# Patient Record
Sex: FEMALE | Race: OTHER | HISPANIC OR LATINO | Employment: FULL TIME | URBAN - METROPOLITAN AREA
[De-identification: names, ages, dates, MRNs, and addresses within clinical notes are randomized per-mention and may not be internally consistent; named-entity substitution may affect disease eponyms.]

---

## 2017-08-26 ENCOUNTER — NON-PROVIDER VISIT (OUTPATIENT)
Dept: URGENT CARE | Facility: CLINIC | Age: 52
End: 2017-08-26

## 2017-08-26 DIAGNOSIS — Z02.1 PRE-EMPLOYMENT DRUG SCREENING: ICD-10-CM

## 2017-08-26 LAB
AMP AMPHETAMINE: NORMAL
COC COCAINE: NORMAL
INT CON NEG: NORMAL
INT CON POS: NORMAL
MET METHAMPHETAMINES: NORMAL
OPI OPIATES: NORMAL
PCP PHENCYCLIDINE: NORMAL
POC DRUG COMMENT 753798-OCCUPATIONAL HEALTH: NORMAL
THC: NORMAL

## 2017-08-26 PROCEDURE — 80305 DRUG TEST PRSMV DIR OPT OBS: CPT | Performed by: PHYSICIAN ASSISTANT

## 2017-11-03 ENCOUNTER — TELEPHONE (OUTPATIENT)
Dept: MEDICAL GROUP | Facility: LAB | Age: 52
End: 2017-11-03

## 2017-11-03 DIAGNOSIS — Z12.39 BREAST CANCER SCREENING, HIGH RISK PATIENT: ICD-10-CM

## 2017-11-03 NOTE — TELEPHONE ENCOUNTER
Patient is coming in to be a new patient with you and she would like to do a mammogram before coming in on 12/8/17

## 2017-12-01 ENCOUNTER — TELEPHONE (OUTPATIENT)
Dept: MEDICAL GROUP | Facility: LAB | Age: 52
End: 2017-12-01

## 2017-12-01 NOTE — TELEPHONE ENCOUNTER
Called Cecilia Taveras and left a message to return my call regarding his/her upcoming NP appointment with Enedina Gipson P.A.-C..   If patient returns the call please transfer them to extension: [phone]

## 2017-12-08 ENCOUNTER — HOSPITAL ENCOUNTER (OUTPATIENT)
Dept: RADIOLOGY | Facility: MEDICAL CENTER | Age: 52
End: 2017-12-08

## 2017-12-08 ENCOUNTER — OFFICE VISIT (OUTPATIENT)
Dept: MEDICAL GROUP | Facility: LAB | Age: 52
End: 2017-12-08
Payer: COMMERCIAL

## 2017-12-08 VITALS
RESPIRATION RATE: 12 BRPM | BODY MASS INDEX: 20.61 KG/M2 | WEIGHT: 112 LBS | OXYGEN SATURATION: 98 % | TEMPERATURE: 98.9 F | DIASTOLIC BLOOD PRESSURE: 78 MMHG | HEIGHT: 62 IN | SYSTOLIC BLOOD PRESSURE: 100 MMHG | HEART RATE: 74 BPM

## 2017-12-08 DIAGNOSIS — Z30.09 FAMILY PLANNING: ICD-10-CM

## 2017-12-08 DIAGNOSIS — E78.5 HYPERLIPIDEMIA, UNSPECIFIED HYPERLIPIDEMIA TYPE: ICD-10-CM

## 2017-12-08 DIAGNOSIS — R73.09 ELEVATED HEMOGLOBIN A1C: ICD-10-CM

## 2017-12-08 DIAGNOSIS — E03.9 HYPOTHYROIDISM, UNSPECIFIED TYPE: ICD-10-CM

## 2017-12-08 PROBLEM — Z00.00 PREVENTATIVE HEALTH CARE: Status: ACTIVE | Noted: 2017-12-08

## 2017-12-08 PROCEDURE — 99204 OFFICE O/P NEW MOD 45 MIN: CPT | Performed by: PHYSICIAN ASSISTANT

## 2017-12-08 RX ORDER — LEVOTHYROXINE SODIUM 0.07 MG/1
75 TABLET ORAL
COMMUNITY
End: 2018-02-08 | Stop reason: SDUPTHER

## 2017-12-08 RX ORDER — LEVONORGESTREL AND ETHINYL ESTRADIOL 0.1-0.02MG
1 KIT ORAL DAILY
COMMUNITY
End: 2018-03-07 | Stop reason: SDUPTHER

## 2017-12-08 ASSESSMENT — PAIN SCALES - GENERAL: PAINLEVEL: NO PAIN

## 2017-12-08 ASSESSMENT — PATIENT HEALTH QUESTIONNAIRE - PHQ9: CLINICAL INTERPRETATION OF PHQ2 SCORE: 0

## 2017-12-08 NOTE — ASSESSMENT & PLAN NOTE
Dx around 2014  Treating with levothyroxine 75mcg qd as directed.   This dose was increased then retested and still subclinically hypothyroidism therefore told to stay and retest in 6 mo.   Last labs 5/22/17 TSH 5.070, free T4 1.23  Had palpitations once when cat passed suddenly.   Diarrhea intermittently but not all the time.   Denies constipation, intolerance to hot/cold, rash

## 2017-12-08 NOTE — PROGRESS NOTES
Chief Complaint   Patient presents with   • Establish Care     HPI:   Cecilia Taveras is a 52 y.o. female here to establish care and to discuss elevated sugars, thyroid and birth control     Family planning  Has been taking birth control for several years.   Currently taking levonorgestrel-ethinyl estradiol 0.1-20mg-mcg qd.   LMP: 11/14/17  Monthly, regular.   Little cramping.   Maybe had one hot flash.   Denies night sweats, vaginal dryness.   Denies HA, CP, abdominal pain, N/V, dysuria, freq, urgency, hematuria, change in vaginal discharge, lesions or sores      Hypothyroidism  Dx around 2014  Treating with levothyroxine 75mcg qd as directed.   This dose was increased then retested and still subclinically hypothyroidism therefore told to stay and retest in 6 mo.   Last labs 5/22/17 TSH 5.070, free T4 1.23  Had palpitations once when cat passed suddenly.   Diarrhea intermittently but not all the time.   Denies constipation, intolerance to hot/cold, rash    Elevated hemoglobin A1c  5/22/17 A1c 5.7  Diet: tried to eat well, healthy smoothie, healthy salad. Sometimes doesn't eat the healthy stuff.   Exercise: aerobics and little weight lifting.   Denies polyuria, polydipsia, polyphagia      Current medicines (including changes today)  Current Outpatient Prescriptions   Medication Sig Dispense Refill   • levothyroxine (SYNTHROID) 75 MCG Tab Take 75 mcg by mouth Every morning on an empty stomach.     • levonorgestrel-ethinyl estradiol (AVIANE, ALESSE, LESSINA) 0.1-20 MG-MCG per tablet Take 1 Tab by mouth every day.       No current facility-administered medications for this visit.      She  has no past medical history on file.  She  has no past surgical history on file.  Social History   Substance Use Topics   • Smoking status: Never Smoker   • Smokeless tobacco: Never Used   • Alcohol use No     Social History     Social History Narrative   • No narrative on file     Family History   Problem Relation Age of Onset   •  "Diabetes Mother    • Cancer Neg Hx    • Heart Disease Neg Hx    • Hypertension Neg Hx    • Stroke Neg Hx      Family Status   Relation Status   • Mother Alive   • Father    • Sister Alive   • Sister Alive   • Neg Hx        ROS  Constitutional: Negative for fever, chills, weight loss  HENT: Negative for ear pain, nosebleeds, congestion, sore throat and neck pain.   Eyes: Negative for blurred vision.   Respiratory: Negative for cough, sputum production, shortness of breath and wheezing.   Cardiovascular: Negative for chest pain, palpitations, orthopnea and leg swelling.   Gastrointestinal: Negative for heartburn, nausea, vomiting and abdominal pain.   Genitourinary: Negative for dysuria, urgency and frequency.   Musculoskeletal: Negative for myalgias, back pain and joint pain.   Skin: Negative for rash and itching.   Neurological: Negative for dizziness, tingling, tremors, sensory change, focal weakness and headaches.   Endo/Heme/Allergies: Does not bruise/bleed easily.   Psychiatric/Behavioral: Negative for depression, + intermittent anxiety, or memory loss.   All other systems reviewed and are negative except as in HPI.     Objective:     Blood pressure 100/78, pulse 74, temperature 37.2 °C (98.9 °F), resp. rate 12, height 1.575 m (5' 2\"), weight 50.8 kg (112 lb), last menstrual period 2017, SpO2 98 %, not currently breastfeeding. Body mass index is 20.49 kg/m².  Physical Exam:    Constitutional: Alert, no distress.  Skin: Warm, dry, good turgor, no rashes in visible areas.  Eye: PERRLA, conjunctiva clear, lids normal.  ENMT: Lips without lesions, good dentition, oropharynx clear.  Neck: Trachea midline, no masses, no thyromegaly.  Respiratory: Unlabored respiratory effort, lungs clear to auscultation, no wheezes, no ronchi.  Cardiovascular: Normal S1, S2, no murmur, no edema.  Abdomen: Soft, non-tender, no masses, no hepatosplenomegaly.  Psych: Alert and oriented x3, normal affect and " mood.    Assessment and Plan:   The following treatment plan was discussed     1. Family planning  Stable, continue current treatment    3. Hypothyroidism, unspecified type  Stable, subclinically hypothyroidism, continue current treatment, labs ordered  - TSH; Future  - FREE THYROXINE; Future    4. Elevated hemoglobin A1c  Stable, continue working on diet and exercise  - COMP METABOLIC PANEL; Future  - HEMOGLOBIN A1C; Future    5. Hyperlipidemia, unspecified hyperlipidemia type  Uncontrolled, continue working on diet and exercise- discussed possible changes that can be made to benefit lifestyle  - LIPID PROFILE; Future      Records requested.  Followup: Return in about 6 months (around 6/8/2018) for annual.           Please note that this dictation was created using voice recognition software. I have made every reasonable attempt to correct obvious errors, but I expect that there are errors of grammar and possibly content that I did not discover before finalizing the note.

## 2017-12-08 NOTE — ASSESSMENT & PLAN NOTE
5/22/17 A1c 5.7  Diet: tried to eat well, healthy smoothie, healthy salad. Sometimes doesn't eat the healthy stuff.   Exercise: aerobics and little weight lifting.   Denies polyuria, polydipsia, polyphagia

## 2017-12-08 NOTE — ASSESSMENT & PLAN NOTE
Has been taking birth control for several years.   Currently taking levonorgestrel-ethinyl estradiol 0.1-20mg-mcg qd.   LMP: 11/14/17  Monthly, regular.   Little cramping.   Maybe had one hot flash.   Denies night sweats, vaginal dryness.   Denies HA, CP, abdominal pain, N/V, dysuria, freq, urgency, hematuria, change in vaginal discharge, lesions or sores

## 2017-12-15 ENCOUNTER — HOSPITAL ENCOUNTER (OUTPATIENT)
Dept: RADIOLOGY | Facility: MEDICAL CENTER | Age: 52
End: 2017-12-15
Attending: PHYSICIAN ASSISTANT
Payer: COMMERCIAL

## 2017-12-15 DIAGNOSIS — Z12.31 SCREENING MAMMOGRAM, ENCOUNTER FOR: ICD-10-CM

## 2017-12-15 PROCEDURE — G0202 SCR MAMMO BI INCL CAD: HCPCS

## 2017-12-19 ENCOUNTER — TELEPHONE (OUTPATIENT)
Dept: MEDICAL GROUP | Facility: LAB | Age: 52
End: 2017-12-19

## 2017-12-19 NOTE — TELEPHONE ENCOUNTER
----- Message from Enedina Gipson P.A.-C. sent at 12/18/2017  5:35 PM PST -----  Please inform pt her mammogram was normal, she does have dense breast which may obscure some small findings, recommend mammogram in 1 year  Thank you  Enedina

## 2017-12-29 ENCOUNTER — HOSPITAL ENCOUNTER (OUTPATIENT)
Dept: LAB | Facility: MEDICAL CENTER | Age: 52
End: 2017-12-29
Attending: PHYSICIAN ASSISTANT
Payer: COMMERCIAL

## 2017-12-29 DIAGNOSIS — R73.09 ELEVATED HEMOGLOBIN A1C: ICD-10-CM

## 2017-12-29 DIAGNOSIS — E03.9 HYPOTHYROIDISM, UNSPECIFIED TYPE: ICD-10-CM

## 2017-12-29 DIAGNOSIS — E78.5 HYPERLIPIDEMIA, UNSPECIFIED HYPERLIPIDEMIA TYPE: ICD-10-CM

## 2017-12-29 LAB
ALBUMIN SERPL BCP-MCNC: 3.8 G/DL (ref 3.2–4.9)
ALBUMIN/GLOB SERPL: 1.3 G/DL
ALP SERPL-CCNC: 39 U/L (ref 30–99)
ALT SERPL-CCNC: 20 U/L (ref 2–50)
ANION GAP SERPL CALC-SCNC: 5 MMOL/L (ref 0–11.9)
AST SERPL-CCNC: 18 U/L (ref 12–45)
BILIRUB SERPL-MCNC: 0.5 MG/DL (ref 0.1–1.5)
BUN SERPL-MCNC: 16 MG/DL (ref 8–22)
CALCIUM SERPL-MCNC: 9.1 MG/DL (ref 8.5–10.5)
CHLORIDE SERPL-SCNC: 105 MMOL/L (ref 96–112)
CHOLEST SERPL-MCNC: 192 MG/DL (ref 100–199)
CO2 SERPL-SCNC: 25 MMOL/L (ref 20–33)
CREAT SERPL-MCNC: 0.71 MG/DL (ref 0.5–1.4)
EST. AVERAGE GLUCOSE BLD GHB EST-MCNC: 120 MG/DL
GFR SERPL CREATININE-BSD FRML MDRD: >60 ML/MIN/1.73 M 2
GLOBULIN SER CALC-MCNC: 2.9 G/DL (ref 1.9–3.5)
GLUCOSE SERPL-MCNC: 92 MG/DL (ref 65–99)
HBA1C MFR BLD: 5.8 % (ref 0–5.6)
HDLC SERPL-MCNC: 63 MG/DL
LDLC SERPL CALC-MCNC: 108 MG/DL
POTASSIUM SERPL-SCNC: 3.8 MMOL/L (ref 3.6–5.5)
PROT SERPL-MCNC: 6.7 G/DL (ref 6–8.2)
SODIUM SERPL-SCNC: 135 MMOL/L (ref 135–145)
T4 FREE SERPL-MCNC: 0.95 NG/DL (ref 0.53–1.43)
TRIGL SERPL-MCNC: 104 MG/DL (ref 0–149)
TSH SERPL DL<=0.005 MIU/L-ACNC: 2.58 UIU/ML (ref 0.38–5.33)

## 2017-12-29 PROCEDURE — 80053 COMPREHEN METABOLIC PANEL: CPT

## 2017-12-29 PROCEDURE — 36415 COLL VENOUS BLD VENIPUNCTURE: CPT

## 2017-12-29 PROCEDURE — 83036 HEMOGLOBIN GLYCOSYLATED A1C: CPT

## 2017-12-29 PROCEDURE — 84443 ASSAY THYROID STIM HORMONE: CPT

## 2017-12-29 PROCEDURE — 84439 ASSAY OF FREE THYROXINE: CPT

## 2017-12-29 PROCEDURE — 80061 LIPID PANEL: CPT

## 2018-01-26 ENCOUNTER — TELEPHONE (OUTPATIENT)
Dept: MEDICAL GROUP | Facility: LAB | Age: 53
End: 2018-01-26

## 2018-01-26 NOTE — TELEPHONE ENCOUNTER
1. Caller Name: Cecilia                                        Call Back Number:       Patient approves a detailed voicemail message: N\A  My chart      Pt called for referral  to derm. Has a mole on chin wants removed.  Please sign order

## 2018-02-08 DIAGNOSIS — E03.9 HYPOTHYROIDISM, UNSPECIFIED TYPE: ICD-10-CM

## 2018-02-08 RX ORDER — LEVOTHYROXINE SODIUM 0.07 MG/1
75 TABLET ORAL
Qty: 90 TAB | Refills: 2 | Status: SHIPPED | OUTPATIENT
Start: 2018-02-08 | End: 2018-06-19 | Stop reason: SDUPTHER

## 2018-02-08 NOTE — TELEPHONE ENCOUNTER
Was the patient seen in the last year in this department? Yes     Does patient have an active prescription for medications requested? No     Received Request Via: Patient     Last visit:12/8/17

## 2018-03-07 DIAGNOSIS — Z30.09 FAMILY PLANNING: ICD-10-CM

## 2018-03-09 RX ORDER — LEVONORGESTREL AND ETHINYL ESTRADIOL 0.1-0.02MG
1 KIT ORAL DAILY
Qty: 84 TAB | Refills: 3 | Status: SHIPPED | OUTPATIENT
Start: 2018-03-09 | End: 2018-11-28 | Stop reason: SDUPTHER

## 2018-06-19 ENCOUNTER — TELEPHONE (OUTPATIENT)
Dept: MEDICAL GROUP | Facility: LAB | Age: 53
End: 2018-06-19

## 2018-06-19 DIAGNOSIS — E03.9 HYPOTHYROIDISM, UNSPECIFIED TYPE: ICD-10-CM

## 2018-06-19 DIAGNOSIS — R73.09 ELEVATED HEMOGLOBIN A1C: ICD-10-CM

## 2018-06-19 DIAGNOSIS — E78.00 PURE HYPERCHOLESTEROLEMIA: ICD-10-CM

## 2018-06-20 RX ORDER — LEVOTHYROXINE SODIUM 0.07 MG/1
75 TABLET ORAL
Qty: 90 TAB | Refills: 2 | Status: SHIPPED | OUTPATIENT
Start: 2018-06-20 | End: 2019-03-23 | Stop reason: SDUPTHER

## 2018-06-21 ENCOUNTER — HOSPITAL ENCOUNTER (OUTPATIENT)
Dept: LAB | Facility: MEDICAL CENTER | Age: 53
End: 2018-06-21
Attending: PHYSICIAN ASSISTANT
Payer: COMMERCIAL

## 2018-06-21 DIAGNOSIS — E03.9 HYPOTHYROIDISM, UNSPECIFIED TYPE: ICD-10-CM

## 2018-06-21 DIAGNOSIS — E78.00 PURE HYPERCHOLESTEROLEMIA: ICD-10-CM

## 2018-06-21 DIAGNOSIS — R73.09 ELEVATED HEMOGLOBIN A1C: ICD-10-CM

## 2018-06-21 LAB
ALBUMIN SERPL BCP-MCNC: 3.9 G/DL (ref 3.2–4.9)
ALBUMIN/GLOB SERPL: 1.3 G/DL
ALP SERPL-CCNC: 48 U/L (ref 30–99)
ALT SERPL-CCNC: 24 U/L (ref 2–50)
ANION GAP SERPL CALC-SCNC: 7 MMOL/L (ref 0–11.9)
AST SERPL-CCNC: 22 U/L (ref 12–45)
BILIRUB SERPL-MCNC: 0.6 MG/DL (ref 0.1–1.5)
BUN SERPL-MCNC: 21 MG/DL (ref 8–22)
CALCIUM SERPL-MCNC: 9 MG/DL (ref 8.5–10.5)
CHLORIDE SERPL-SCNC: 106 MMOL/L (ref 96–112)
CHOLEST SERPL-MCNC: 175 MG/DL (ref 100–199)
CO2 SERPL-SCNC: 24 MMOL/L (ref 20–33)
CREAT SERPL-MCNC: 0.73 MG/DL (ref 0.5–1.4)
EST. AVERAGE GLUCOSE BLD GHB EST-MCNC: 123 MG/DL
GLOBULIN SER CALC-MCNC: 3 G/DL (ref 1.9–3.5)
GLUCOSE SERPL-MCNC: 87 MG/DL (ref 65–99)
HBA1C MFR BLD: 5.9 % (ref 0–5.6)
HDLC SERPL-MCNC: 61 MG/DL
LDLC SERPL CALC-MCNC: 99 MG/DL
POTASSIUM SERPL-SCNC: 4.2 MMOL/L (ref 3.6–5.5)
PROT SERPL-MCNC: 6.9 G/DL (ref 6–8.2)
SODIUM SERPL-SCNC: 137 MMOL/L (ref 135–145)
T4 FREE SERPL-MCNC: 0.94 NG/DL (ref 0.53–1.43)
TRIGL SERPL-MCNC: 73 MG/DL (ref 0–149)
TSH SERPL DL<=0.005 MIU/L-ACNC: 4.59 UIU/ML (ref 0.38–5.33)

## 2018-06-21 PROCEDURE — 80053 COMPREHEN METABOLIC PANEL: CPT

## 2018-06-21 PROCEDURE — 80061 LIPID PANEL: CPT

## 2018-06-21 PROCEDURE — 83036 HEMOGLOBIN GLYCOSYLATED A1C: CPT

## 2018-06-21 PROCEDURE — 36415 COLL VENOUS BLD VENIPUNCTURE: CPT

## 2018-06-21 PROCEDURE — 84439 ASSAY OF FREE THYROXINE: CPT

## 2018-06-21 PROCEDURE — 84443 ASSAY THYROID STIM HORMONE: CPT

## 2018-06-28 ENCOUNTER — OFFICE VISIT (OUTPATIENT)
Dept: MEDICAL GROUP | Facility: LAB | Age: 53
End: 2018-06-28
Payer: COMMERCIAL

## 2018-06-28 VITALS
HEIGHT: 62 IN | HEART RATE: 67 BPM | TEMPERATURE: 99.7 F | BODY MASS INDEX: 19.69 KG/M2 | RESPIRATION RATE: 12 BRPM | WEIGHT: 107 LBS | OXYGEN SATURATION: 97 % | DIASTOLIC BLOOD PRESSURE: 60 MMHG | SYSTOLIC BLOOD PRESSURE: 110 MMHG

## 2018-06-28 DIAGNOSIS — R73.09 ELEVATED HEMOGLOBIN A1C: ICD-10-CM

## 2018-06-28 DIAGNOSIS — D22.9 NEVUS: ICD-10-CM

## 2018-06-28 DIAGNOSIS — E03.9 HYPOTHYROIDISM, UNSPECIFIED TYPE: ICD-10-CM

## 2018-06-28 PROCEDURE — 99214 OFFICE O/P EST MOD 30 MIN: CPT | Performed by: PHYSICIAN ASSISTANT

## 2018-06-28 NOTE — ASSESSMENT & PLAN NOTE
A1c increased slightly to 5.9.  5/22/17 A1c 5.7  Diet: tried to eat well, healthy smoothie, healthy salad. Sometimes doesn't eat the healthy stuff but that's rare per pt. She did get on a muffin kick and then was told it had a lot of sugar, recently stopped.   Exercise: aerobics and little weight lifting. Tried to not do much cardio since she does not want to lose anymore weight.   Has been 107# for last 4-5 months per pt. Denies unwanted weight loss but stated if she started exercising this would go done.   Denies polyuria, polydipsia, polyphagia

## 2018-06-28 NOTE — ASSESSMENT & PLAN NOTE
This is chronic. Pt treating with levothyroxine 75mcg qd  Taking medicine as directed on empty stomach with water and waits at last 30 minutes to consume other food or beverage.   Denies palpitations, skin changes, temperature intolerance, changes in bowel habits.  Results for GRACIE MELCHOR (MRN [id number]) as of 6/28/2018 12:57   Ref. Range 6/21/2018 07:34   TSH Latest Ref Range: 0.380 - 5.330 uIU/mL 4.590   Free T-4 Latest Ref Range: 0.53 - 1.43 ng/dL 0.94

## 2018-06-28 NOTE — PROGRESS NOTES
Subjective:     Chief Complaint   Patient presents with   • Results     Gracie Taveras is a 52 y.o. female here today for prediabetes, thyroid, mole as listed below    Elevated hemoglobin A1c  A1c increased slightly to 5.9.  5/22/17 A1c 5.7  Diet: tried to eat well, healthy smoothie, healthy salad. Sometimes doesn't eat the healthy stuff but that's rare per pt. She did get on a muffin kick and then was told it had a lot of sugar, recently stopped.   Exercise: aerobics and little weight lifting. Tried to not do much cardio since she does not want to lose anymore weight.   Has been 107# for last 4-5 months per pt. Denies unwanted weight loss but stated if she started exercising this would go done.   Denies polyuria, polydipsia, polyphagia      Hypothyroidism  This is chronic. Pt treating with levothyroxine 75mcg qd  Taking medicine as directed on empty stomach with water and waits at last 30 minutes to consume other food or beverage.   Denies palpitations, skin changes, temperature intolerance, changes in bowel habits.  Results for GRACIE TAVERAS (MRN [id number]) as of 6/28/2018 12:57   Ref. Range 6/21/2018 07:34   TSH Latest Ref Range: 0.380 - 5.330 uIU/mL 4.590   Free T-4 Latest Ref Range: 0.53 - 1.43 ng/dL 0.94         Nevus  Has mole on right chin for almost her whole life, no change.   Occasionally has a hair growing out of it.   She is finally at the point she wants this removed and would like referral to dermatologist  Denies hx skin cancer, changing of lesion, growing, changing color.      Current medicines (including changes today)  Current Outpatient Prescriptions   Medication Sig Dispense Refill   • levothyroxine (SYNTHROID) 75 MCG Tab TAKE 1 TAB BY MOUTH EVERY MORNING ON AN EMPTY STOMACH. 90 Tab 2   • levonorgestrel-ethinyl estradiol (AVIANE, ALESSE, LESSINA) 0.1-20 MG-MCG per tablet Take 1 Tab by mouth every day. 84 Tab 3     No current facility-administered medications for this visit.      She  has no past  "medical history on file.    ROS   No chest pain, no shortness of breath, no abdominal pain       Objective:     Blood pressure 110/60, pulse 67, temperature 37.6 °C (99.7 °F), resp. rate 12, height 1.575 m (5' 2.01\"), weight 48.5 kg (107 lb), SpO2 97 %. Body mass index is 19.57 kg/m².   Physical Exam:  Alert, oriented in no acute distress.  Eye contact is good, speech goal directed, affect calm  HEENT: conjunctiva non-injected, sclera non-icteric, PERRL.  Pinna normal. TM pearly gray.   Oral mucous membranes pink and moist with no lesions.  Neck No adenopathy or masses in the neck or supraclavicular regions.  Lungs: clear to auscultation bilaterally with good excursion.  CV: regular rate and rhythm.  Abdomen: soft, nontender, no HSM, No CVAT  Skin: 4mm skin colored dome shaped soft symmetric papule.   Ext: no edema, color normal, peripheral pulses 2+, temperature normal        Assessment and Plan:   The following treatment plan was discussed     1. Elevated hemoglobin A1c  chronic issue. Discussed diet more in depth. Recommended nutritionist but pt declines at this time.   - COMP METABOLIC PANEL; Future  - HEMOGLOBIN A1C; Future  - LIPID PROFILE; Future    2. Hypothyroidism, unspecified type  Stable, continue current treatment    3. Nevus  New dx, B9 looking, referral placed.   - REFERRAL TO DERMATOLOGY      Followup: Return in about 6 months (around 12/28/2018) for chronic issues.           Please note that this dictation was created using voice recognition software. I have made every reasonable attempt to correct obvious errors, but I expect that there are errors of grammar and possibly content that I did not discover before finalizing the note.  "

## 2018-06-28 NOTE — ASSESSMENT & PLAN NOTE
Has mole on right chin for almost her whole life, no change.   Occasionally has a hair growing out of it.   She is finally at the point she wants this removed and would like referral to dermatologist  Denies hx skin cancer, changing of lesion, growing, changing color.

## 2018-07-27 ENCOUNTER — APPOINTMENT (RX ONLY)
Dept: URBAN - METROPOLITAN AREA CLINIC 4 | Facility: CLINIC | Age: 53
Setting detail: DERMATOLOGY
End: 2018-07-27

## 2018-07-27 DIAGNOSIS — D22 MELANOCYTIC NEVI: ICD-10-CM

## 2018-07-27 PROBLEM — D48.5 NEOPLASM OF UNCERTAIN BEHAVIOR OF SKIN: Status: ACTIVE | Noted: 2018-07-27

## 2018-07-27 PROBLEM — E03.9 HYPOTHYROIDISM, UNSPECIFIED: Status: ACTIVE | Noted: 2018-07-27

## 2018-07-27 PROCEDURE — ? BIOPSY BY SHAVE METHOD

## 2018-07-27 PROCEDURE — 11100: CPT

## 2018-07-27 ASSESSMENT — LOCATION ZONE DERM: LOCATION ZONE: FACE

## 2018-07-27 ASSESSMENT — LOCATION DETAILED DESCRIPTION DERM: LOCATION DETAILED: RIGHT INFERIOR LATERAL BUCCAL CHEEK

## 2018-07-27 ASSESSMENT — LOCATION SIMPLE DESCRIPTION DERM: LOCATION SIMPLE: RIGHT CHEEK

## 2018-07-27 NOTE — PROCEDURE: BIOPSY BY SHAVE METHOD
Size Of Lesion In Cm: 0
Lab Facility: 
Curettage Text: The wound bed was treated with curettage after the biopsy was performed.
Billing Type: Third-Party Bill
Was A Bandage Applied: Yes
Wound Care: Bacitracin
Biopsy Method: Personna blade
Consent: Written consent was obtained and risks were reviewed including but not limited to scarring, infection, bleeding, scabbing, incomplete removal, nerve damage and allergy to anesthesia.
Type Of Destruction Used: Curettage
Detail Level: Detailed
Depth Of Biopsy: dermis
Anesthesia Volume In Cc: 2
Render Post-Care Instructions In Note?: no
Post-Care Instructions: I reviewed with the patient in detail post-care instructions. Patient is to keep the biopsy site dry overnight, and then apply bacitracin twice daily until healed. Patient may apply hydrogen peroxide soaks to remove any crusting.
Electrodesiccation And Curettage Text: The wound bed was treated with electrodesiccation and curettage after the biopsy was performed.
Silver Nitrate Text: The wound bed was treated with silver nitrate after the biopsy was performed.
Dressing: bandage
Biopsy Type: H and E
Notification Instructions: Patient will be notified of biopsy results. However, patient instructed to call the office if not contacted within 2 weeks.
Electrodesiccation Text: The wound bed was treated with electrodesiccation after the biopsy was performed.
Cryotherapy Text: The wound bed was treated with cryotherapy after the biopsy was performed.
Lab: [id number]
Hemostasis: Drysol
Anesthesia Type: 1% lidocaine with epinephrine

## 2018-11-16 ENCOUNTER — TELEPHONE (OUTPATIENT)
Dept: MEDICAL GROUP | Facility: LAB | Age: 53
End: 2018-11-16

## 2018-11-16 NOTE — TELEPHONE ENCOUNTER
1. Caller Name: Cecilia      Call Back Number: [phone]      Patient approves a detailed voicemail message: N\A    Upcoming appointment     Patient has an upcoming appointment with Dr. Heath on 12/20/18. However, she wants Dr. Heath to know prior to her appointment that since she turned 53 years old her periods have been irregular and she is not sure if she still needs to be taking birth control. She would also like to discuss the cologuard test during her appointment.

## 2018-11-28 DIAGNOSIS — Z30.09 FAMILY PLANNING: ICD-10-CM

## 2018-11-28 NOTE — TELEPHONE ENCOUNTER
Was the patient seen in the last year in this department? Yes    Does patient have an active prescription for medications requested? No     Received Request Via: Pharmacy   Pt informed via my chart

## 2018-11-29 RX ORDER — LEVONORGESTREL AND ETHINYL ESTRADIOL 0.1-0.02MG
1 KIT ORAL DAILY
Qty: 84 TAB | Refills: 0 | Status: SHIPPED | OUTPATIENT
Start: 2018-11-29 | End: 2019-05-16

## 2018-11-29 NOTE — TELEPHONE ENCOUNTER
Dr. Sadler covering for Enedina Gipson.  Patient needs to establish with a new PCP for future refills.

## 2018-12-06 ENCOUNTER — HOSPITAL ENCOUNTER (OUTPATIENT)
Dept: LAB | Facility: MEDICAL CENTER | Age: 53
End: 2018-12-06
Attending: PHYSICIAN ASSISTANT
Payer: COMMERCIAL

## 2018-12-06 DIAGNOSIS — R73.09 ELEVATED HEMOGLOBIN A1C: ICD-10-CM

## 2018-12-06 LAB
ALBUMIN SERPL BCP-MCNC: 4.1 G/DL (ref 3.2–4.9)
ALBUMIN/GLOB SERPL: 1.2 G/DL
ALP SERPL-CCNC: 54 U/L (ref 30–99)
ALT SERPL-CCNC: 23 U/L (ref 2–50)
ANION GAP SERPL CALC-SCNC: 9 MMOL/L (ref 0–11.9)
AST SERPL-CCNC: 20 U/L (ref 12–45)
BILIRUB SERPL-MCNC: 0.6 MG/DL (ref 0.1–1.5)
BUN SERPL-MCNC: 20 MG/DL (ref 8–22)
CALCIUM SERPL-MCNC: 9 MG/DL (ref 8.5–10.5)
CHLORIDE SERPL-SCNC: 104 MMOL/L (ref 96–112)
CHOLEST SERPL-MCNC: 203 MG/DL (ref 100–199)
CO2 SERPL-SCNC: 23 MMOL/L (ref 20–33)
CREAT SERPL-MCNC: 0.76 MG/DL (ref 0.5–1.4)
FASTING STATUS PATIENT QL REPORTED: NORMAL
GLOBULIN SER CALC-MCNC: 3.4 G/DL (ref 1.9–3.5)
GLUCOSE SERPL-MCNC: 77 MG/DL (ref 65–99)
HDLC SERPL-MCNC: 59 MG/DL
LDLC SERPL CALC-MCNC: 131 MG/DL
POTASSIUM SERPL-SCNC: 4 MMOL/L (ref 3.6–5.5)
PROT SERPL-MCNC: 7.5 G/DL (ref 6–8.2)
SODIUM SERPL-SCNC: 136 MMOL/L (ref 135–145)
TRIGL SERPL-MCNC: 67 MG/DL (ref 0–149)

## 2018-12-06 PROCEDURE — 36415 COLL VENOUS BLD VENIPUNCTURE: CPT

## 2018-12-06 PROCEDURE — 83036 HEMOGLOBIN GLYCOSYLATED A1C: CPT

## 2018-12-06 PROCEDURE — 80053 COMPREHEN METABOLIC PANEL: CPT

## 2018-12-06 PROCEDURE — 80061 LIPID PANEL: CPT

## 2018-12-10 ENCOUNTER — TELEPHONE (OUTPATIENT)
Dept: MEDICAL GROUP | Facility: LAB | Age: 53
End: 2018-12-10

## 2018-12-10 NOTE — TELEPHONE ENCOUNTER
VOICEMAIL  1. Caller Name: Cecilia Taveras                      Call Back Number: [phone] (home)     2. Message: Pt calling regarding lab results, stating she was suppose to have a lab results to test for pre-diabetes. Called lab and spoke to hematology, their machine is down for testing A1C's and they're currently getting it fixed and once fixed, they will run the back log of tests. Informed pt of the aforementioned.     3. Patient approves office to leave a detailed voicemail/MyChart message: yes

## 2018-12-11 LAB
EST. AVERAGE GLUCOSE BLD GHB EST-MCNC: 126 MG/DL
HBA1C MFR BLD: 6 % (ref 0–5.6)

## 2018-12-20 ENCOUNTER — HOSPITAL ENCOUNTER (OUTPATIENT)
Dept: LAB | Facility: MEDICAL CENTER | Age: 53
End: 2018-12-20
Attending: FAMILY MEDICINE
Payer: COMMERCIAL

## 2018-12-20 ENCOUNTER — OFFICE VISIT (OUTPATIENT)
Dept: MEDICAL GROUP | Facility: LAB | Age: 53
End: 2018-12-20
Payer: COMMERCIAL

## 2018-12-20 VITALS
RESPIRATION RATE: 20 BRPM | HEART RATE: 66 BPM | TEMPERATURE: 99.3 F | DIASTOLIC BLOOD PRESSURE: 62 MMHG | SYSTOLIC BLOOD PRESSURE: 106 MMHG | BODY MASS INDEX: 19.69 KG/M2 | HEIGHT: 62 IN | OXYGEN SATURATION: 98 % | WEIGHT: 107 LBS

## 2018-12-20 DIAGNOSIS — E03.9 ACQUIRED HYPOTHYROIDISM: ICD-10-CM

## 2018-12-20 DIAGNOSIS — E78.5 DYSLIPIDEMIA: ICD-10-CM

## 2018-12-20 DIAGNOSIS — R73.03 PREDIABETES: ICD-10-CM

## 2018-12-20 PROCEDURE — 99214 OFFICE O/P EST MOD 30 MIN: CPT | Performed by: FAMILY MEDICINE

## 2018-12-20 PROCEDURE — 84443 ASSAY THYROID STIM HORMONE: CPT

## 2018-12-20 PROCEDURE — 36415 COLL VENOUS BLD VENIPUNCTURE: CPT

## 2018-12-20 PROCEDURE — 84439 ASSAY OF FREE THYROXINE: CPT

## 2018-12-20 ASSESSMENT — PATIENT HEALTH QUESTIONNAIRE - PHQ9: CLINICAL INTERPRETATION OF PHQ2 SCORE: 0

## 2018-12-20 NOTE — PATIENT INSTRUCTIONS
Prediabetes Eating Plan  Prediabetes--also called impaired glucose tolerance or impaired fasting glucose--is a condition that causes blood sugar (blood glucose) levels to be higher than normal. Following a healthy diet can help to keep prediabetes under control. It can also help to lower the risk of type 2 diabetes and heart disease, which are increased in people who have prediabetes. Along with regular exercise, a healthy diet:  · Promotes weight loss.  · Helps to control blood sugar levels.  · Helps to improve the way that the body uses insulin.  What do I need to know about this eating plan?  · Use the glycemic index (GI) to plan your meals. The index tells you how quickly a food will raise your blood sugar. Choose low-GI foods. These foods take a longer time to raise blood sugar.  · Pay close attention to the amount of carbohydrates in the food that you eat. Carbohydrates increase blood sugar levels.  · Keep track of how many calories you take in. Eating the right amount of calories will help you to achieve a healthy weight. Losing about 7 percent of your starting weight can help to prevent type 2 diabetes.  · You may want to follow a Mediterranean diet. This diet includes a lot of vegetables, lean meats or fish, whole grains, fruits, and healthy oils and fats.  What foods can I eat?  Grains   Whole grains, such as whole-wheat or whole-grain breads, crackers, cereals, and pasta. Unsweetened oatmeal. Bulgur. Barley. Quinoa. Brown rice. Corn or whole-wheat flour tortillas or taco shells.  Vegetables   Lettuce. Spinach. Peas. Beets. Cauliflower. Cabbage. Broccoli. Carrots. Tomatoes. Squash. Eggplant. Herbs. Peppers. Onions. Cucumbers. [place] sprouts.  Fruits   Berries. Bananas. Apples. Oranges. Grapes. Papaya. Balbir. Pomegranate. Kiwi. Grapefruit. Cherries.  Meats and Other Protein Sources   Seafood. Lean meats, such as chicken and turkey or lean cuts of pork and beef. Tofu. Eggs. Nuts. Beans.  Dairy   Low-fat or  fat-free dairy products, such as yogurt, cottage cheese, and cheese.  Beverages   Water. Tea. Coffee. Sugar-free or diet soda. [place] water. Milk. Milk alternatives, such as soy or almond milk.  Condiments   Mustard. Relish. Low-fat, low-sugar ketchup. Low-fat, low-sugar barbecue sauce. Low-fat or fat-free mayonnaise.  Sweets and Desserts   Sugar-free or low-fat pudding. Sugar-free or low-fat ice cream and other frozen treats.  Fats and Oils   Avocado. Walnuts. Olive oil.  The items listed above may not be a complete list of recommended foods or beverages. Contact your dietitian for more options.   What foods are not recommended?  Grains   Refined white flour and flour products, such as bread, pasta, snack foods, and cereals.  Beverages   Sweetened drinks, such as sweet iced tea and soda.  Sweets and Desserts   Baked goods, such as cake, cupcakes, pastries, cookies, and cheesecake.  The items listed above may not be a complete list of foods and beverages to avoid. Contact your dietitian for more information.   This information is not intended to replace advice given to you by your health care provider. Make sure you discuss any questions you have with your health care provider.  Document Released: 05/03/2016 Document Revised: 05/25/2017 Document Reviewed: 01/13/2016  [employer] Patient Education © 2017 [employer]    Prediabetes  Prediabetes is the condition of having a blood sugar (blood glucose) level that is higher than it should be, but not high enough for you to be diagnosed with type 2 diabetes. Having prediabetes puts you at risk for developing type 2 diabetes (type 2 diabetes mellitus). Prediabetes may be called impaired glucose tolerance or impaired fasting glucose.  Prediabetes usually does not cause symptoms. Your health care provider can diagnose this condition with blood tests. You may be tested for prediabetes if you are overweight and if you have at least one other risk factor for  prediabetes.  Risk factors for prediabetes include:  · Having a family member with type 2 diabetes.  · Being overweight or obese.  · Being older than age 45.  · Being of American-[place], -American, /, or / descent.  · Having an inactive (sedentary) lifestyle.  · Having a history of gestational diabetes or polycystic ovarian syndrome (PCOS).  · Having low levels of good cholesterol (HDL-C) or high levels of blood fats (triglycerides).  · Having high blood pressure.  What is blood glucose and how is blood glucose measured?     Blood glucose refers to the amount of glucose in your bloodstream. Glucose comes from eating foods that contain sugars and starches (carbohydrates) that the body breaks down into glucose. Your blood glucose level may be measured in mg/dL (milligrams per deciliter) or mmol/L (millimoles per liter). Your blood glucose may be checked with one or more of the following blood tests:  · A fasting blood glucose (FBG) test. You will not be allowed to eat (you will fast) for at least 8 hours before a blood sample is taken.  ¨ A normal range for FBG is  mg/dl (3.9-5.6 mmol/L).  · An A1c (hemoglobin A1c) blood test. This test provides information about blood glucose control over the previous 2?3 months.  · An oral glucose tolerance test (OGTT). This test measures your blood glucose twice:  ¨ After fasting. This is your baseline level.  ¨ Two hours after you drink a beverage that contains glucose.  You may be diagnosed with prediabetes:  · If your FBG is 100?125 mg/dL (5.6-6.9 mmol/L).  · If your A1c level is 5.7?6.4%.  · If your OGGT result is 140?199 mg/dL (7.8-11 mmol/L).  These blood tests may be repeated to confirm your diagnosis.  What happens if blood glucose is too high?  The pancreas produces a hormone (insulin) that helps move glucose from the bloodstream into cells. When cells in the body do not respond properly to insulin that the body makes  (insulin resistance), excess glucose builds up in the blood instead of going into cells. As a result, high blood glucose (hyperglycemia) can develop, which can cause many complications. This is a symptom of prediabetes.  What can happen if blood glucose stays higher than normal for a long time?  Having high blood glucose for a long time is dangerous. Too much glucose in your blood can damage your nerves and blood vessels. Long-term damage can lead to complications from diabetes, which may include:  · Heart disease.  · Stroke.  · Blindness.  · Kidney disease.  · Depression.  · Poor circulation in the feet and legs, which could lead to surgical removal (amputation) in severe cases.  How can prediabetes be prevented from turning into type 2 diabetes?     To help prevent type 2 diabetes, take the following actions:  · Be physically active.  ¨ Do moderate-intensity physical activity for at least 30 minutes on at least 5 days of the week, or as much as told by your health care provider. This could be brisk walking, biking, or water aerobics.  ¨ Ask your health care provider what activities are safe for you. A mix of physical activities may be best, such as walking, swimming, cycling, and strength training.  · Lose weight as told by your health care provider.  ¨ Losing 5-7% of your body weight can reverse insulin resistance.  ¨ Your health care provider can determine how much weight loss is best for you and can help you lose weight safely.  · Follow a healthy meal plan. This includes eating lean proteins, complex carbohydrates, fresh fruits and vegetables, low-fat dairy products, and healthy fats.  ¨ Follow instructions from your health care provider about eating or drinking restrictions.  ¨ Make an appointment to see a diet and nutrition specialist (registered dietitian) to help you create a healthy eating plan that is right for you.  · Do not smoke or use any tobacco products, such as cigarettes, chewing tobacco, and  e-cigarettes. If you need help quitting, ask your health care provider.  · Take over-the-counter and prescription medicines as told by your health care provider. You may be prescribed medicines that help lower the risk of type 2 diabetes.  This information is not intended to replace advice given to you by your health care provider. Make sure you discuss any questions you have with your health care provider.  Document Released: 04/10/2017 Document Revised: 05/25/2017 Document Reviewed: 02/07/2017  [employer] Interactive Patient Education © 2017 [employer]

## 2018-12-21 LAB
T4 FREE SERPL-MCNC: 1.21 NG/DL (ref 0.53–1.43)
TSH SERPL DL<=0.005 MIU/L-ACNC: 1.58 UIU/ML (ref 0.38–5.33)

## 2018-12-27 NOTE — ASSESSMENT & PLAN NOTE
Patient has a history of prediabetes.  She has been managing this with diet and exercise.  She denies any polyuria or polyphagia.

## 2018-12-27 NOTE — ASSESSMENT & PLAN NOTE
Patient has a history of dyslipidemia.  She has been managing this with diet and exercise.  Results for GRACIE MELCHOR (MRN [id number]) as of 12/27/2018 12:37   Ref. Range 12/6/2018 11:14   Cholesterol,Tot Latest Ref Range: 100 - 199 mg/dL 203 (H)   Triglycerides Latest Ref Range: 0 - 149 mg/dL 67   HDL Latest Ref Range: >=40 mg/dL 59   LDL Latest Ref Range: <100 mg/dL 131 (H)   Her total cholesterol and LDL numbers have been trending up.

## 2018-12-27 NOTE — PROGRESS NOTES
Chief Complaint   Patient presents with   • Follow-Up     labs         Gracie Taveras is a 53 y.o. female here to establish care and for evaluation and management of:        HPI:    Acquired hypothyroidism  Stable. Currently taking levothyroxine 75 mcg daily as prescribed.  Denies palpitations, skin changes, temperature intolerance, or changes in bowel habits        Dyslipidemia  Patient has a history of dyslipidemia.  She has been managing this with diet and exercise.  Results for GRACIE TAVERAS (MRN [id number]) as of 12/27/2018 12:37   Ref. Range 12/6/2018 11:14   Cholesterol,Tot Latest Ref Range: 100 - 199 mg/dL 203 (H)   Triglycerides Latest Ref Range: 0 - 149 mg/dL 67   HDL Latest Ref Range: >=40 mg/dL 59   LDL Latest Ref Range: <100 mg/dL 131 (H)   Her total cholesterol and LDL numbers have been trending up.    Prediabetes  Patient has a history of prediabetes.  She has been managing this with diet and exercise.  She denies any polyuria or polyphagia.      Allergies   Allergen Reactions   • Pcn [Penicillins] Shortness of Breath and Rash     Rash, SOB, tachycardia       Current medicines (including changes today)  Current Outpatient Prescriptions   Medication Sig Dispense Refill   • levonorgestrel-ethinyl estradiol (AVIANE, ALESSE, LESSINA) 0.1-20 MG-MCG per tablet Take 1 Tab by mouth every day. 84 Tab 0   • levothyroxine (SYNTHROID) 75 MCG Tab TAKE 1 TAB BY MOUTH EVERY MORNING ON AN EMPTY STOMACH. 90 Tab 2     No current facility-administered medications for this visit.      She  has a past medical history of Thyroid disease.  She  has no past surgical history on file.  Social History   Substance Use Topics   • Smoking status: Never Smoker   • Smokeless tobacco: Never Used   • Alcohol use No     Social History     Social History Narrative   • No narrative on file     Family History   Problem Relation Age of Onset   • Diabetes Mother    • Diabetes Father    • Diabetes Maternal Uncle    • Diabetes Maternal Grandmother  "   • Diabetes Paternal Grandfather    • Cancer Neg Hx    • Heart Disease Neg Hx    • Hypertension Neg Hx    • Stroke Neg Hx      Family Status   Relation Status   • Mo Alive   • Fa    • Sis Alive   • Sis Alive   • MUnc (Not Specified)   • MGMo (Not Specified)   • PGFa (Not Specified)   • Neg Hx (Not Specified)         ROS  No fever or chills.  No nausea or vomiting.  No chest pain or palpitations.  No cough or SOB.  No pain with urination or hematuria.  No black or bloody stools.  All other systems reviewed and are negative     Objective:     Blood pressure 106/62, pulse 66, temperature 37.4 °C (99.3 °F), temperature source Temporal, resp. rate 20, height 1.575 m (5' 2\"), weight 48.5 kg (107 lb), last menstrual period 2018, SpO2 98 %, not currently breastfeeding. Body mass index is 19.57 kg/m².  Physical Exam:      Well developed, well nourished.  Alert, oriented in no acute distress.  Psych: Eye contact is good, speech goal directed, affect calm  Eyes: conjunctiva non-injected, sclera non-icteric.  Neck Supple.  No adenopathy or masses in the neck or supraclavicular regions. No thyromegaly  Lungs: clear to auscultation bilaterally with good excursion. No wheezes or rhonchi  CV: regular rate and rhythm. No murmur  Ext: no edema, color normal, vascularity normal, temperature normal      Assessment and Plan:   The following treatment plan was discussed    1. Prediabetes  Dietary counseling done.  Patient has a low BMI already.  We did discuss family history and genetics.  Dietary information given.  - HEMOGLOBIN A1C; Future    2. Acquired hypothyroidism  Check labs and adjust levothyroxine dose as needed  - TSH; Future  - FREE THYROXINE; Future    3. Dyslipidemia  Low-fat diet information given.  Patient would like to avoid medications if possible.  Recheck in 1 year.      Records reviewed.    Any change or worsening of signs or symptoms, patient encouraged to follow-up or report to the emergency room " for further evaluation. Patient understands and agrees.    Followup: Return in about 6 months (around 6/20/2019).

## 2019-03-07 ENCOUNTER — TELEPHONE (OUTPATIENT)
Dept: MEDICAL GROUP | Facility: LAB | Age: 54
End: 2019-03-07

## 2019-03-08 NOTE — TELEPHONE ENCOUNTER
Phone Number Called: [phone]    Message: Pt called stating they need a diagnostic ultrasound due to a lump in L Axillary region so she can schedule her annual mammogram, please advise     Left Message for patient to call back: N\A

## 2019-03-14 ENCOUNTER — OFFICE VISIT (OUTPATIENT)
Dept: MEDICAL GROUP | Facility: LAB | Age: 54
End: 2019-03-14
Payer: COMMERCIAL

## 2019-03-14 VITALS
RESPIRATION RATE: 20 BRPM | WEIGHT: 101.41 LBS | TEMPERATURE: 99.3 F | HEART RATE: 78 BPM | DIASTOLIC BLOOD PRESSURE: 60 MMHG | BODY MASS INDEX: 18.66 KG/M2 | OXYGEN SATURATION: 100 % | HEIGHT: 62 IN | SYSTOLIC BLOOD PRESSURE: 104 MMHG

## 2019-03-14 DIAGNOSIS — N63.20 LEFT BREAST MASS: ICD-10-CM

## 2019-03-14 DIAGNOSIS — N64.59 INVERTED NIPPLE: ICD-10-CM

## 2019-03-14 PROCEDURE — 99214 OFFICE O/P EST MOD 30 MIN: CPT | Performed by: FAMILY MEDICINE

## 2019-03-14 ASSESSMENT — PATIENT HEALTH QUESTIONNAIRE - PHQ9: CLINICAL INTERPRETATION OF PHQ2 SCORE: 0

## 2019-03-14 NOTE — PATIENT INSTRUCTIONS
Breast Scan  A breast scan is an imaging test that is done to examine dense breast tissue. A breast scan is done using a radioactive material that produces images of the breast. A breast scan is used in people who have breast lesions that resulted from:  · Rope-like, lumpy tissue (fibrocystic disease).  · Solid, painless lumps (fibroadenoma).  · Damaged fatty breast tissue (fat necrosis).  A breast scan may also be done to find out the best treatment for people who have breast cancer.  Tell a health care provider about:  · Any allergies you have.  · All medicines you are taking, including vitamins, herbs, eye drops, creams, and over-the-counter medicines.  · Any problems you or family members have had with anesthetic medicines.  · Any blood disorders you have.  · Any surgeries you have had.  · Any medical conditions you have.  · Whether you are pregnant or may be pregnant, if this applies.  · Whether you are breastfeeding, if this applies.  What are the risks?  Generally, this is a safe procedure. However, problems may occur, including:  · Slight discomfort from injection of a radioactive substance.  · Allergic reaction to a contrast or radioactive substance used during the procedure.  What happens before the procedure?  · Ask your health care provider about changing or stopping your regular medicines. This is especially important if you are taking diabetes medicines or blood thinners.  What happens during the procedure?  · You will be asked to remove all jewelry and clothing from the waist up.  · An IV tube will be inserted into one of your veins.  · You will be asked to lie face-down on a table. The breast that will be scanned will be placed through an opening in the table. You may also be asked to get into different positions during the scan.  · The radioactive agent will be injected into the IV tube. You may have a slight metallic taste after the injection.  · A scanner will be placed over the breast to record  the radiation, which produces an image of the breast.  · The procedure may be repeated on the other breast.  · When the scan is complete, the IV tube will be removed.  The procedure may vary among health care providers and hospitals.  What happens after the procedure?  · You will be asked to get up slowly. This helps you avoid light-headedness after lying flat during the procedure.  · Drink enough fluid to keep your urine clear or pale yellow. This helps to wash (flush) the remaining radioactive agent out of your body.  · It is up to you to get the results of your procedure. Ask your health care provider, or the department that is doing the procedure, when your results will be ready.  Summary  · A breast scan is an imaging test that looks at breast lesions. A breast scan may also be done to find out the best treatment for people who have breast cancer.  · A radioactive substance is injected through an IV tube, and pictures are taken of the breasts.  · After the procedure, drink enough fluid to keep your urine clear or pale yellow. This helps to wash (flush) the remaining radioactive agent out of your body.  This information is not intended to replace advice given to you by your health care provider. Make sure you discuss any questions you have with your health care provider.  Document Released: 01/12/2006 Document Revised: 08/17/2017 Document Reviewed: 08/15/2017  Elsevier Interactive Patient Education © 2017 [employer]

## 2019-03-21 ENCOUNTER — HOSPITAL ENCOUNTER (OUTPATIENT)
Dept: LAB | Facility: MEDICAL CENTER | Age: 54
End: 2019-03-21
Attending: FAMILY MEDICINE
Payer: COMMERCIAL

## 2019-03-21 DIAGNOSIS — R73.03 PREDIABETES: ICD-10-CM

## 2019-03-21 PROCEDURE — 36415 COLL VENOUS BLD VENIPUNCTURE: CPT

## 2019-03-21 PROCEDURE — 83036 HEMOGLOBIN GLYCOSYLATED A1C: CPT

## 2019-03-22 LAB
EST. AVERAGE GLUCOSE BLD GHB EST-MCNC: 117 MG/DL
HBA1C MFR BLD: 5.7 % (ref 0–5.6)

## 2019-03-22 NOTE — PROGRESS NOTES
"Subjective:     Chief Complaint   Patient presents with   • Nodule     x 1 week in L Axillary Region, tender upon palpitation       Cecilia Taveras is a 53 y.o. female here today for evaluation and management of:    Left breast mass  Patient complains of a mass in the left lateral breast and axilla that she just found recently while doing a self breast exam.   It is tender to touch.  She also reports that her right nipple has become inverted in the last year.  She denies any trauma to the area.       Allergies   Allergen Reactions   • Pcn [Penicillins] Shortness of Breath and Rash     Rash, SOB, tachycardia       Current medicines (including changes today)  Current Outpatient Prescriptions   Medication Sig Dispense Refill   • levonorgestrel-ethinyl estradiol (AVIANE, ALESSE, LESSINA) 0.1-20 MG-MCG per tablet Take 1 Tab by mouth every day. 84 Tab 0   • levothyroxine (SYNTHROID) 75 MCG Tab TAKE 1 TAB BY MOUTH EVERY MORNING ON AN EMPTY STOMACH. 90 Tab 2     No current facility-administered medications for this visit.        She  has a past medical history of Thyroid disease.    Patient Active Problem List    Diagnosis Date Noted   • Inverted nipple 03/14/2019   • Left breast mass 03/14/2019   • Dyslipidemia 12/20/2018   • Nevus 06/28/2018   • Preventative health care 12/08/2017   • Family planning 12/08/2017   • Acquired hypothyroidism 12/08/2017   • Prediabetes 12/08/2017       ROS   No fever or chills.  No nausea or vomiting.  No chest pain or palpitations.  No cough or SOB.  No pain with urination or hematuria.  No black or bloody stools.       Objective:     Blood pressure 104/60, pulse 78, temperature 37.4 °C (99.3 °F), temperature source Temporal, resp. rate 20, height 1.575 m (5' 2\"), weight 46 kg (101 lb 6.6 oz), SpO2 100 %, not currently breastfeeding. Body mass index is 18.55 kg/m².   Physical Exam:  Well developed, well nourished.  Alert, oriented in no acute distress.  Eye contact is good, speech goal " directed, affect calm  Eyes: conjunctiva non-injected, sclera non-icteric.  Neck Supple.  No adenopathy or masses in the neck or supraclavicular regions. No thyromegaly  Lungs: clear to auscultation bilaterally with good excursion. No wheezes or rhonchi  CV: regular rate and rhythm. No murmur  BREAST EXAM: No skin changes, peau d'orange but there is nipple retraction of the right breast. No discharge. No axillary or supraclavicular adenopathy. Left breast mass at 3 o'clock laterally that is tender to palpation          Assessment and Plan:   The following treatment plan was discussed    1. Inverted nipple  Diagnostic mammogram with ultrasound to further assess.  Await the results  - US-BREAST BILAT-COMPLETE; Future  - MA-MAMMO DIAGNOSTIC BILAT W/NATALIYA W/CAD; Future    2. Left breast mass  Diagnostic mammogram with ultrasound to further assess.  Await the results  - US-BREAST BILAT-COMPLETE; Future  - MA-MAMMO DIAGNOSTIC BILAT W/NATALIYA W/CAD; Future    Any change or worsening of signs or symptoms, patient encouraged to follow-up or report to the emergency room for further evaluation. Patient understands and agrees.    Followup: Return if symptoms worsen or fail to improve.

## 2019-03-22 NOTE — ASSESSMENT & PLAN NOTE
Patient complains of a mass in the left lateral breast and axilla that she just found recently while doing a self breast exam.   It is tender to touch.  She also reports that her right nipple has become inverted in the last year.  She denies any trauma to the area.

## 2019-03-23 DIAGNOSIS — E03.9 HYPOTHYROIDISM, UNSPECIFIED TYPE: ICD-10-CM

## 2019-03-23 NOTE — TELEPHONE ENCOUNTER
Was the patient seen in the last year in this department? Yes lov 3/14/19    Does patient have an active prescription for medications requested? No     Received Request Via: Pharmacy

## 2019-03-26 RX ORDER — LEVOTHYROXINE SODIUM 0.07 MG/1
75 TABLET ORAL
Qty: 90 TAB | Refills: 2 | Status: SHIPPED | OUTPATIENT
Start: 2019-03-26 | End: 2019-12-18

## 2019-04-08 ENCOUNTER — HOSPITAL ENCOUNTER (OUTPATIENT)
Dept: RADIOLOGY | Facility: MEDICAL CENTER | Age: 54
End: 2019-04-08
Attending: FAMILY MEDICINE
Payer: COMMERCIAL

## 2019-04-08 DIAGNOSIS — N64.59 INVERTED NIPPLE: ICD-10-CM

## 2019-04-08 DIAGNOSIS — N63.20 LEFT BREAST MASS: ICD-10-CM

## 2019-04-08 PROCEDURE — 76642 ULTRASOUND BREAST LIMITED: CPT | Mod: LT

## 2019-04-08 PROCEDURE — G0279 TOMOSYNTHESIS, MAMMO: HCPCS

## 2019-05-16 ENCOUNTER — OFFICE VISIT (OUTPATIENT)
Dept: MEDICAL GROUP | Facility: LAB | Age: 54
End: 2019-05-16
Payer: COMMERCIAL

## 2019-05-16 VITALS
HEART RATE: 66 BPM | OXYGEN SATURATION: 97 % | BODY MASS INDEX: 19.14 KG/M2 | SYSTOLIC BLOOD PRESSURE: 108 MMHG | HEIGHT: 62 IN | DIASTOLIC BLOOD PRESSURE: 56 MMHG | RESPIRATION RATE: 20 BRPM | TEMPERATURE: 99.7 F | WEIGHT: 104 LBS

## 2019-05-16 DIAGNOSIS — N95.9 PREMENOPAUSAL PATIENT: ICD-10-CM

## 2019-05-16 DIAGNOSIS — R73.03 PREDIABETES: ICD-10-CM

## 2019-05-16 DIAGNOSIS — E03.9 ACQUIRED HYPOTHYROIDISM: ICD-10-CM

## 2019-05-16 PROCEDURE — 99214 OFFICE O/P EST MOD 30 MIN: CPT | Performed by: FAMILY MEDICINE

## 2019-05-16 NOTE — PATIENT INSTRUCTIONS
Menopause  Menopause is the normal time of life when menstrual periods stop completely. Menopause is complete when you have missed 12 consecutive menstrual periods. It usually occurs between the ages of 48 years and 55 years. Very rarely does a woman develop menopause before the age of 40 years. At menopause, your ovaries stop producing the female hormones estrogen and progesterone. This can cause undesirable symptoms and also affect your health. Sometimes the symptoms may occur 4-5 years before the menopause begins. There is no relationship between menopause and:  · Oral contraceptives.  · Number of children you had.  · Race.  · The age your menstrual periods started (menarche).  Heavy smokers and very thin women may develop menopause earlier in life.  What are the causes?  · The ovaries stop producing the female hormones estrogen and progesterone.  Other causes include:  · Surgery to remove both ovaries.  · The ovaries stop functioning for no known reason.  · Tumors of the pituitary gland in the brain.  · Medical disease that affects the ovaries and hormone production.  · Radiation treatment to the abdomen or pelvis.  · Chemotherapy that affects the ovaries.  What are the signs or symptoms?  · Hot flashes.  · Night sweats.  · Decrease in sex drive.  · Vaginal dryness and thinning of the vagina causing painful intercourse.  · Dryness of the skin and developing wrinkles.  · Headaches.  · Tiredness.  · Irritability.  · Memory problems.  · Weight gain.  · Bladder infections.  · Hair growth of the face and chest.  · Infertility.  More serious symptoms include:  · Loss of bone (osteoporosis) causing breaks (fractures).  · Depression.  · Hardening and narrowing of the arteries (atherosclerosis) causing heart attacks and strokes.  How is this diagnosed?  · When the menstrual periods have stopped for 12 straight months.  · Physical exam.  · Hormone studies of the blood.  How is this treated?  There are many treatment  choices and nearly as many questions about them. The decisions to treat or not to treat menopausal changes is an individual choice made with your health care provider. Your health care provider can discuss the treatments with you. Together, you can decide which treatment will work best for you. Your treatment choices may include:  · Hormone therapy (estrogen and progesterone).  · Non-hormonal medicines.  · Treating the individual symptoms with medicine (for example antidepressants for depression).  · Herbal medicines that may help specific symptoms.  · Counseling by a psychiatrist or psychologist.  · Group therapy.  · Lifestyle changes including:  ¨ Eating healthy.  ¨ Regular exercise.  ¨ Limiting caffeine and alcohol.  ¨ Stress management and meditation.  · No treatment.  Follow these instructions at home:  · Take the medicine your health care provider gives you as directed.  · Get plenty of sleep and rest.  · Exercise regularly.  · Eat a diet that contains calcium (good for the bones) and soy products (acts like estrogen hormone).  · Avoid alcoholic beverages.  · Do not smoke.  · If you have hot flashes, dress in layers.  · Take supplements, calcium, and vitamin D to strengthen bones.  · You can use over-the-counter lubricants or moisturizers for vaginal dryness.  · Group therapy is sometimes very helpful.  · Acupuncture may be helpful in some cases.  Contact a health care provider if:  · You are not sure you are in menopause.  · You are having menopausal symptoms and need advice and treatment.  · You are still having menstrual periods after age 55 years.  · You have pain with intercourse.  · Menopause is complete (no menstrual period for 12 months) and you develop vaginal bleeding.  · You need a referral to a specialist (gynecologist, psychiatrist, or psychologist) for treatment.  Get help right away if:  · You have severe depression.  · You have excessive vaginal bleeding.  · You fell and think you have a broken  bone.  · You have pain when you urinate.  · You develop leg or chest pain.  · You have a fast pounding heart beat (palpitations).  · You have severe headaches.  · You develop vision problems.  · You feel a lump in your breast.  · You have abdominal pain or severe indigestion.  This information is not intended to replace advice given to you by your health care provider. Make sure you discuss any questions you have with your health care provider.  Document Released: 03/09/2005 Document Revised: 05/25/2017 Document Reviewed: 07/17/2014  [employer] Interactive Patient Education © 2017 [employer]

## 2019-05-21 NOTE — PROGRESS NOTES
"Subjective:     Chief Complaint   Patient presents with   • Orders Needed     thyroid and diabetes labs    • Medication Management     discontinue contraception        Cecilia Taveras is a 53 y.o. female here today for evaluation and management of:    Prediabetes  She has cut out sugar and most carbs.  She is feeling good.  She denies polyuria and polyphagia.    Acquired hypothyroidism  Stable. Currently taking levothyroxine 75 mcg daily as prescribed.  Denies palpitations, skin changes, temperature intolerance, or changes in bowel habits           Allergies   Allergen Reactions   • Pcn [Penicillins] Shortness of Breath and Rash     Rash, SOB, tachycardia       Current medicines (including changes today)  Current Outpatient Prescriptions   Medication Sig Dispense Refill   • levothyroxine (SYNTHROID) 75 MCG Tab TAKE 1 TAB BY MOUTH EVERY MORNING ON AN EMPTY STOMACH. 90 Tab 2   • levonorgestrel-ethinyl estradiol (AVIANE, ALESSE, LESSINA) 0.1-20 MG-MCG per tablet TAKE 1 TABLET BY MOUTH EVERY DAY 84 Tab 3     No current facility-administered medications for this visit.        She  has a past medical history of Thyroid disease.    Patient Active Problem List    Diagnosis Date Noted   • Inverted nipple 03/14/2019   • Left breast mass 03/14/2019   • Dyslipidemia 12/20/2018   • Nevus 06/28/2018   • Preventative health care 12/08/2017   • Family planning 12/08/2017   • Acquired hypothyroidism 12/08/2017   • Prediabetes 12/08/2017       ROS   No fever or chills.  No nausea or vomiting.  No chest pain or palpitations.  No cough or SOB.  No pain with urination or hematuria.  No black or bloody stools.       Objective:     /56 (BP Location: Left arm, Patient Position: Sitting, BP Cuff Size: Adult)   Pulse 66   Temp 37.6 °C (99.7 °F) (Temporal)   Resp 20   Ht 1.575 m (5' 2\")   Wt 47.2 kg (104 lb)   SpO2 97%  Body mass index is 19.02 kg/m².   Physical Exam:  Well developed, well nourished.  Alert, oriented in no acute " distress.  Eye contact is good, speech goal directed, affect calm  Eyes: conjunctiva non-injected, sclera non-icteric.  Neck Supple.  No adenopathy or masses in the neck or supraclavicular regions. No thyromegaly  Lungs: clear to auscultation bilaterally with good excursion. No wheezes or rhonchi  CV: regular rate and rhythm. No murmur        Assessment and Plan:   The following treatment plan was discussed    1. Acquired hypothyroidism  Check thyroid labs and adjust supplementation as needed  - TSH; Future  - FREE THYROXINE; Future    2. Prediabetes  Continue dietary modifications and exercise.  Recheck hemoglobin A1c.  - HEMOGLOBIN A1C; Future    3. Premenopausal patient  Check FSH LH.  Await results.  - FSH/LH; Future    Any change or worsening of signs or symptoms, patient encouraged to follow-up or report to the emergency room for further evaluation. Patient understands and agrees.    Followup: Return in about 6 months (around 11/16/2019).

## 2019-06-13 ENCOUNTER — HOSPITAL ENCOUNTER (OUTPATIENT)
Dept: LAB | Facility: MEDICAL CENTER | Age: 54
End: 2019-06-13
Attending: FAMILY MEDICINE
Payer: COMMERCIAL

## 2019-06-13 DIAGNOSIS — E03.9 ACQUIRED HYPOTHYROIDISM: ICD-10-CM

## 2019-06-13 DIAGNOSIS — N95.9 PREMENOPAUSAL PATIENT: ICD-10-CM

## 2019-06-13 DIAGNOSIS — R73.03 PREDIABETES: ICD-10-CM

## 2019-06-13 LAB
EST. AVERAGE GLUCOSE BLD GHB EST-MCNC: 123 MG/DL
FSH SERPL-ACNC: 93.1 MIU/ML
HBA1C MFR BLD: 5.9 % (ref 0–5.6)
LH SERPL-ACNC: 40 IU/L
T4 FREE SERPL-MCNC: 1.11 NG/DL (ref 0.53–1.43)
TSH SERPL DL<=0.005 MIU/L-ACNC: 2.55 UIU/ML (ref 0.38–5.33)

## 2019-06-13 PROCEDURE — 36415 COLL VENOUS BLD VENIPUNCTURE: CPT

## 2019-06-13 PROCEDURE — 84439 ASSAY OF FREE THYROXINE: CPT

## 2019-06-13 PROCEDURE — 83001 ASSAY OF GONADOTROPIN (FSH): CPT

## 2019-06-13 PROCEDURE — 83036 HEMOGLOBIN GLYCOSYLATED A1C: CPT

## 2019-06-13 PROCEDURE — 83002 ASSAY OF GONADOTROPIN (LH): CPT

## 2019-06-13 PROCEDURE — 84443 ASSAY THYROID STIM HORMONE: CPT

## 2019-10-14 ENCOUNTER — TELEPHONE (OUTPATIENT)
Dept: MEDICAL GROUP | Facility: PHYSICIAN GROUP | Age: 54
End: 2019-10-14

## 2019-10-14 NOTE — TELEPHONE ENCOUNTER
Colorectal Care Gap Outreach    1. Confirmed patient is between the ages of 50-75: YES     2. Confirmed that patient IS overdue or due soon for colorectal cancer screening: YES     3. Were orders placed within the last 12 months to complete screening: NO     Phone Number Called: [phone] (home)     Call outcome: left message for patient to call back regarding message below   Pt is due for Colonoscopy, Pt also has the option to do a FIT test or Cologuard. Both of these tests can be sent out to Pt's home if pt chooses.    _____________________________________________________________________    Colon Cancer Screening Guidelines:     Important: If patient has any history of colon polyps or family history of colorectal cancer, FIT and Cologuard are NOT appropriate options. A colonoscopy is the recommended test for this set of patients.    • Colonoscopy  o Always recommend colonoscopy first.   o A colonoscopy is recommended over the other tests because it provides direct visualization of the colon and if there are small polyps these can also be removed with one procedure.  o If negative and no family history, could be cleared for 10 years.     • Cologuard/FIT  o Cologuard is completed once every 3 years.  o FIT is completed annually.  o If positive, Cologuard and FIT will require a diagnostic colonoscopy. Screening colonoscopies are classically covered by insurances, however, diagnostic colonoscopies may result in a bill.

## 2019-11-12 ENCOUNTER — TELEPHONE (OUTPATIENT)
Dept: MEDICAL GROUP | Facility: LAB | Age: 54
End: 2019-11-12

## 2019-11-12 DIAGNOSIS — E78.5 DYSLIPIDEMIA: ICD-10-CM

## 2019-11-12 DIAGNOSIS — R73.03 PREDIABETES: ICD-10-CM

## 2019-11-12 NOTE — TELEPHONE ENCOUNTER
Patient is seeing you in December for a 6 month followup.  Could you pend labs for her please?  Thank you.

## 2019-12-10 ENCOUNTER — HOSPITAL ENCOUNTER (OUTPATIENT)
Dept: LAB | Facility: MEDICAL CENTER | Age: 54
End: 2019-12-10
Attending: FAMILY MEDICINE
Payer: COMMERCIAL

## 2019-12-10 DIAGNOSIS — R73.03 PREDIABETES: ICD-10-CM

## 2019-12-10 DIAGNOSIS — E78.5 DYSLIPIDEMIA: ICD-10-CM

## 2019-12-10 LAB
EST. AVERAGE GLUCOSE BLD GHB EST-MCNC: 117 MG/DL
HBA1C MFR BLD: 5.7 % (ref 0–5.6)

## 2019-12-10 PROCEDURE — 80061 LIPID PANEL: CPT

## 2019-12-10 PROCEDURE — 36415 COLL VENOUS BLD VENIPUNCTURE: CPT

## 2019-12-10 PROCEDURE — 80048 BASIC METABOLIC PNL TOTAL CA: CPT

## 2019-12-10 PROCEDURE — 83036 HEMOGLOBIN GLYCOSYLATED A1C: CPT

## 2019-12-11 LAB
ANION GAP SERPL CALC-SCNC: 8 MMOL/L (ref 0–11.9)
BUN SERPL-MCNC: 26 MG/DL (ref 8–22)
CALCIUM SERPL-MCNC: 9.5 MG/DL (ref 8.5–10.5)
CHLORIDE SERPL-SCNC: 106 MMOL/L (ref 96–112)
CHOLEST SERPL-MCNC: 228 MG/DL (ref 100–199)
CO2 SERPL-SCNC: 26 MMOL/L (ref 20–33)
CREAT SERPL-MCNC: 0.72 MG/DL (ref 0.5–1.4)
FASTING STATUS PATIENT QL REPORTED: NORMAL
GLUCOSE SERPL-MCNC: 90 MG/DL (ref 65–99)
HDLC SERPL-MCNC: 76 MG/DL
LDLC SERPL CALC-MCNC: 143 MG/DL
POTASSIUM SERPL-SCNC: 4 MMOL/L (ref 3.6–5.5)
SODIUM SERPL-SCNC: 140 MMOL/L (ref 135–145)
TRIGL SERPL-MCNC: 46 MG/DL (ref 0–149)

## 2019-12-30 ENCOUNTER — OFFICE VISIT (OUTPATIENT)
Dept: MEDICAL GROUP | Facility: LAB | Age: 54
End: 2019-12-30
Payer: COMMERCIAL

## 2019-12-30 VITALS
DIASTOLIC BLOOD PRESSURE: 70 MMHG | HEIGHT: 62 IN | BODY MASS INDEX: 20.09 KG/M2 | HEART RATE: 66 BPM | OXYGEN SATURATION: 97 % | WEIGHT: 109.2 LBS | SYSTOLIC BLOOD PRESSURE: 102 MMHG | TEMPERATURE: 98.9 F | RESPIRATION RATE: 16 BRPM

## 2019-12-30 DIAGNOSIS — Z12.11 SCREENING FOR COLON CANCER: ICD-10-CM

## 2019-12-30 DIAGNOSIS — R73.03 PREDIABETES: ICD-10-CM

## 2019-12-30 DIAGNOSIS — E78.5 DYSLIPIDEMIA: ICD-10-CM

## 2019-12-30 PROCEDURE — 99214 OFFICE O/P EST MOD 30 MIN: CPT | Performed by: FAMILY MEDICINE

## 2019-12-30 NOTE — ASSESSMENT & PLAN NOTE
Results for GRACIE MELCHOR (MRN [id number]) as of 12/30/2019 11:16   Ref. Range 12/6/2018 11:14 12/10/2019 09:21   Cholesterol,Tot Latest Ref Range: 100 - 199 mg/dL 203 (H) 228 (H)   Triglycerides Latest Ref Range: 0 - 149 mg/dL 67 46   HDL Latest Ref Range: >=40 mg/dL 59 76

## 2019-12-30 NOTE — PROGRESS NOTES
"Subjective:     Chief Complaint   Patient presents with   • Prediabetes     review recent lab results       Gracie Taveras is a 54 y.o. female here today for evaluation and management of:    Prediabetes  Patient has been watching her diet and trying to exercise more.  Overall she has been feeling good.    Dyslipidemia  Results for GRACIE TAVERAS (MRN [id number]) as of 12/30/2019 11:16   Ref. Range 12/6/2018 11:14 12/10/2019 09:21   Cholesterol,Tot Latest Ref Range: 100 - 199 mg/dL 203 (H) 228 (H)   Triglycerides Latest Ref Range: 0 - 149 mg/dL 67 46   HDL Latest Ref Range: >=40 mg/dL 59 76        Allergies   Allergen Reactions   • Pcn [Penicillins] Shortness of Breath and Rash     Rash, SOB, tachycardia       Current medicines (including changes today)  Current Outpatient Medications   Medication Sig Dispense Refill   • levothyroxine (SYNTHROID) 75 MCG Tab TAKE 1 TAB BY MOUTH EVERY MORNING ON AN EMPTY STOMACH. 90 Tab 1     No current facility-administered medications for this visit.        She  has a past medical history of Thyroid disease.    Patient Active Problem List    Diagnosis Date Noted   • Inverted nipple 03/14/2019   • Left breast mass 03/14/2019   • Dyslipidemia 12/20/2018   • Nevus 06/28/2018   • Preventative health care 12/08/2017   • Family planning 12/08/2017   • Acquired hypothyroidism 12/08/2017   • Prediabetes 12/08/2017       ROS   No fever or chills.  No nausea or vomiting.  No chest pain or palpitations.  No cough or SOB.  No pain with urination or hematuria.  No black or bloody stools.       Objective:     /70   Pulse 66   Temp 37.2 °C (98.9 °F) (Temporal)   Resp 16   Ht 1.575 m (5' 2\")   Wt 49.5 kg (109 lb 3.2 oz)   SpO2 97%  Body mass index is 19.97 kg/m².   Physical Exam:  Well developed, well nourished.  Alert, oriented in no acute distress.  Eye contact is good, speech goal directed, affect calm  Eyes: conjunctiva non-injected, sclera non-icteric.  Neck Supple.  No adenopathy or " masses in the neck or supraclavicular regions. No thyromegaly  Lungs: clear to auscultation bilaterally with good excursion. No wheezes or rhonchi  CV: regular rate and rhythm. No murmur      Assessment and Plan:   The following treatment plan was discussed    1. Prediabetes  Continue dietary modifications.  Continue to monitor every 6 months.    2. Screening for colon cancer  Cologuard ordered  - COLOGUARD (FIT DNA)    3. Dyslipidemia  Patient has actually improved her ratio from 3.6-3.0.  Continue dietary modifications.  Mediterranean diet information given.    Any change or worsening of signs or symptoms, patient encouraged to follow-up or report to the emergency room for further evaluation. Patient understands and agrees.    Followup: Return in about 6 months (around 6/30/2020).

## 2019-12-30 NOTE — ASSESSMENT & PLAN NOTE
Patient has been watching her diet and trying to exercise more.  Overall she has been feeling good.

## 2019-12-30 NOTE — PATIENT INSTRUCTIONS
Mediterranean Diet  A Mediterranean diet refers to food and lifestyle choices that are based on the traditions of countries located on the [place]. This way of eating has been shown to help prevent certain conditions and improve outcomes for people who have chronic diseases, like kidney disease and heart disease.  What are tips for following this plan?  Lifestyle  · Cook and eat meals together with your family, when possible.  · Drink enough fluid to keep your urine clear or pale yellow.  · Be physically active every day. This includes:  ¨ Aerobic exercise like running or swimming.  ¨ Leisure activities like gardening, walking, or housework.  · Get 7-8 hours of sleep each night.  · If recommended by your health care provider, drink red wine in moderation. This means 1 glass a day for nonpregnant women and 2 glasses a day for men. A glass of wine equals 5 oz (150 mL).  Reading food labels  · Check the serving size of packaged foods. For foods such as rice and pasta, the serving size refers to the amount of cooked product, not dry.  · Check the total fat in packaged foods. Avoid foods that have saturated fat or trans fats.  · Check the ingredients list for added sugars, such as corn syrup.  Shopping  · At the grocery store, buy most of your food from the areas near the walls of the store. This includes:  ¨ Fresh fruits and vegetables (produce).  ¨ Grains, beans, nuts, and seeds. Some of these may be available in unpackaged forms or large amounts (in bulk).  ¨ Fresh seafood.  ¨ Poultry and eggs.  ¨ Low-fat dairy products.  · Buy whole ingredients instead of prepackaged foods.  · Buy fresh fruits and vegetables in-season from local farmers markets.  · Buy frozen fruits and vegetables in resealable bags.  · If you do not have access to quality fresh seafood, buy precooked frozen shrimp or canned fish, such as tuna, salmon, or sardines.  · Buy small amounts of raw or cooked vegetables, salads, or olives from the  deli or salad bar at your store.  · Stock your pantry so you always have certain foods on hand, such as olive oil, canned tuna, canned tomatoes, rice, pasta, and beans.  Cooking  · Cook foods with extra-virgin olive oil instead of using butter or other vegetable oils.  · Have meat as a side dish, and have vegetables or grains as your main dish. This means having meat in small portions or adding small amounts of meat to foods like pasta or stew.  · Use beans or vegetables instead of meat in common dishes like chili or lasagna.  · [place] with different cooking methods. Try roasting or broiling vegetables instead of steaming or sautéeing them.  · Add frozen vegetables to soups, stews, pasta, or rice.  · Add nuts or seeds for added healthy fat at each meal. You can add these to yogurt, salads, or vegetable dishes.  · Marinate fish or vegetables using olive oil, lemon juice, garlic, and fresh herbs.  Meal planning  · Plan to eat 1 vegetarian meal one day each week. Try to work up to 2 vegetarian meals, if possible.  · Eat seafood 2 or more times a week.  · Have healthy snacks readily available, such as:  ¨ Vegetable sticks with hummus.  ¨ Greek yogurt.  ¨ Fruit and nut trail mix.  · Eat balanced meals throughout the week. This includes:  ¨ Fruit: 2-3 servings a day  ¨ Vegetables: 4-5 servings a day  ¨ Low-fat dairy: 2 servings a day  ¨ Fish, poultry, or lean meat: 1 serving a day  ¨ Beans and legumes: 2 or more servings a week  ¨ Nuts and seeds: 1-2 servings a day  ¨ Whole grains: 6-8 servings a day  ¨ Extra-virgin olive oil: 3-4 servings a day  · Limit red meat and sweets to only a few servings a month  What are my food choices?  · Mediterranean diet  ¨ Recommended  ¨ Grains: Whole-grain pasta. Brown rice. Bulgar wheat. Polenta. Couscous. Whole-wheat bread. Oatmeal. Quinoa.  ¨ Vegetables: Artichokes. Beets. Broccoli. Cabbage. Carrots. Eggplant. Green beans. Chard. Kale. Spinach. Onions. Leeks. Peas. Squash.  Tomatoes. Peppers. Radishes.  ¨ Fruits: Apples. Apricots. Avocado. Berries. Bananas. Cherries. Dates. Figs. Grapes. [place]. Melon. Oranges. Peaches. Plums. Pomegranate.  ¨ Meats and other protein foods: Beans. Almonds. Sunflower seeds. Pine nuts. Peanuts. Cod. [place]. Scallops. Shrimp. Tuna. Tilapia. Clams. Oysters. Eggs.  ¨ Dairy: Low-fat milk. Cheese. Greek yogurt.  ¨ Beverages: Water. Red wine. Herbal tea.  ¨ Fats and oils: Extra virgin olive oil. Avocado oil. Grape seed oil.  ¨ Sweets and desserts: Greek yogurt with honey. Baked apples. Poached pears. Trail mix.  ¨ Seasoning and other foods: Basil. Cilantro. Coriander. Cumin. Mint. Parsley. Alex. Rosemary. Tarragon. Garlic. Oregano. Thyme. Pepper. Balsalmic vinegar. Tahini. Hummus. Tomato sauce. Olives. Mushrooms.  ¨ Limit these  ¨ Grains: Prepackaged pasta or rice dishes. Prepackaged cereal with added sugar.  ¨ Vegetables: Deep fried potatoes (french fries).  ¨ Fruits: Fruit canned in syrup.  ¨ Meats and other protein foods: Beef. Pork. Lamb. Poultry with skin. Hot dogs. Maguire.  ¨ Dairy: Ice cream. Sour cream. Whole milk.  ¨ Beverages: Juice. Sugar-sweetened soft drinks. Beer. Liquor and spirits.  ¨ Fats and oils: Butter. Canola oil. Vegetable oil. Beef fat (tallow). Lard.  ¨ Sweets and desserts: Cookies. Cakes. Pies. Candy.  ¨ Seasoning and other foods: Mayonnaise. Premade sauces and marinades.  ¨ The items listed may not be a complete list. Talk with your dietitian about what dietary choices are right for you.  Summary  · The Mediterranean diet includes both food and lifestyle choices.  · Eat a variety of fresh fruits and vegetables, beans, nuts, seeds, and whole grains.  · Limit the amount of red meat and sweets that you eat.  · Talk with your health care provider about whether it is safe for you to drink red wine in moderation. This means 1 glass a day for nonpregnant women and 2 glasses a day for men. A glass of wine equals 5 oz (150 mL).  This information  "is not intended to replace advice given to you by your health care provider. Make sure you discuss any questions you have with your health care provider.  Document Released: 08/10/2017 Document Revised: 09/12/2017 Document Reviewed: 08/10/2017  [employer] Interactive Patient Education © 2017 [employer]  Lipoproteins  Lipoproteins are transport carriers made of protein and different types of fat (cholesterol). In small amounts, cholesterol is important because it is used to form cell membranes and certain hormones. Cholesterol is also needed for other essential body functions. Cholesterol, which is a soft, waxy substance, does not mix with blood, which is watery. Cholesterol is transported in the blood via lipoproteins. High levels of certain lipoproteins can increase your risk of heart disease and stroke because they attach to, and build up on, artery walls. These fatty deposits make it difficult for blood to flow through the arteries.  There are 3 types of lipoproteins:  · Low Density liprotein (LDL):  · LDL or \"bad\" cholesterol carries cholesterol particles throughout the blood stream. LDL cholesterol builds up on the artery walls, making them hard and narrow. The more LDL cholesterol you have in your blood, the greater your risk of heart disease. LDL levels less than 100 mg/dL are optimal.  · High Density Lipoprotein (HDL):  · HDL or \"good\" cholesterol helps to protect against heart attack. Low levels of HDL (less than 40 mg/dL in men and less than 50 mg/dL in women) can increase the risk of heart disease. HDL attaches itself to excess cholesterol and takes it to the liver. From there, the excess cholesterol is processed and excreted from the body. An optimal HDL level is 60 mg/dL and above.  · Very Low Density Lipoprotein (VLDL):  · This type of cholesterol contains a specific type of fat (triglycerides). VLDL cholesterol makes LDL cholesterol particles larger and is considered a \"bad\" fat. A high level of VLDL " can increase the risk of heart disease. VLDL levels less than 30 mg/dL are optimal.  DIAGNOSIS   The above levels are determined through blood testing. Your caregiver will draw your blood and look at your levels. If your LDL and VLDL levels are high and your HDL is low, your caregiver may recommend:  · Medicine.  · Weight loss.  · A change in eating habits.  Immediate measures you can take are:  · Quitting smoking.  · Exercising. Excess weight can lead to numerous health problems.  · Eating a healthy diet. Focus on whole grain breads, lean meats, fresh fruits, and vegetables. Avoid fast food, fried food, and high fat food.  · Drinking alcohol only in moderation. A man should limit his alcoholic intake to 2 drinks a day. A woman should limit her alcoholic intake to 1 drink a day.  · Taking cholesterol lowering medicines as instructed (if prescribed). Make sure you follow up with your caregiver as instructed.  MAKE SURE YOU:   · Understand these instructions.  · Will watch your condition.  · Will get help right away if you are not doing well or get worse.  Document Released: 03/16/2010 Document Revised: 06/18/2013 Document Reviewed: 03/16/2010  [employer]® Patient Information ©2014 [employer] [place].

## 2020-05-26 ENCOUNTER — TELEPHONE (OUTPATIENT)
Dept: MEDICAL GROUP | Facility: LAB | Age: 55
End: 2020-05-26

## 2020-05-26 DIAGNOSIS — E03.9 HYPOTHYROIDISM, UNSPECIFIED TYPE: ICD-10-CM

## 2020-05-26 DIAGNOSIS — R73.03 PREDIABETES: ICD-10-CM

## 2020-06-02 ENCOUNTER — HOSPITAL ENCOUNTER (OUTPATIENT)
Dept: LAB | Facility: MEDICAL CENTER | Age: 55
End: 2020-06-02
Attending: FAMILY MEDICINE
Payer: COMMERCIAL

## 2020-06-02 DIAGNOSIS — E03.9 HYPOTHYROIDISM, UNSPECIFIED TYPE: ICD-10-CM

## 2020-06-02 DIAGNOSIS — R73.03 PREDIABETES: ICD-10-CM

## 2020-06-02 PROCEDURE — 84439 ASSAY OF FREE THYROXINE: CPT

## 2020-06-02 PROCEDURE — 84443 ASSAY THYROID STIM HORMONE: CPT

## 2020-06-02 PROCEDURE — 83036 HEMOGLOBIN GLYCOSYLATED A1C: CPT

## 2020-06-02 PROCEDURE — 36415 COLL VENOUS BLD VENIPUNCTURE: CPT

## 2020-06-03 LAB
EST. AVERAGE GLUCOSE BLD GHB EST-MCNC: 111 MG/DL
HBA1C MFR BLD: 5.5 % (ref 0–5.6)
T4 FREE SERPL-MCNC: 1.59 NG/DL (ref 0.93–1.7)
TSH SERPL DL<=0.005 MIU/L-ACNC: 0.9 UIU/ML (ref 0.38–5.33)

## 2020-06-05 ENCOUNTER — TELEPHONE (OUTPATIENT)
Dept: MEDICAL GROUP | Facility: LAB | Age: 55
End: 2020-06-05

## 2020-06-05 NOTE — TELEPHONE ENCOUNTER
1. Caller Name: Cecilia Taveras                   Call Back Number:[phone] (home)     How would the patient prefer to be contacted with a response:     Pt NAVEEN regarding her lab results

## 2020-06-11 DIAGNOSIS — E03.9 HYPOTHYROIDISM, UNSPECIFIED TYPE: ICD-10-CM

## 2020-06-11 RX ORDER — LEVOTHYROXINE SODIUM 0.07 MG/1
75 TABLET ORAL
Qty: 90 TAB | Refills: 1 | Status: SHIPPED | OUTPATIENT
Start: 2020-06-11 | End: 2020-12-21

## 2020-06-11 NOTE — TELEPHONE ENCOUNTER
Received request via: Pharmacy    Was the patient seen in the last year in this department? Yes    Does the patient have an active prescription (recently filled or refills available) for medication(s) requested? No     LEVOTHYROXINE 75 MCG TABLET

## 2020-12-12 ENCOUNTER — PATIENT MESSAGE (OUTPATIENT)
Dept: MEDICAL GROUP | Facility: LAB | Age: 55
End: 2020-12-12

## 2020-12-19 DIAGNOSIS — E03.9 HYPOTHYROIDISM, UNSPECIFIED TYPE: ICD-10-CM

## 2020-12-21 RX ORDER — LEVOTHYROXINE SODIUM 0.07 MG/1
75 TABLET ORAL
Qty: 90 TAB | Refills: 0 | Status: SHIPPED | OUTPATIENT
Start: 2020-12-21 | End: 2020-12-31 | Stop reason: SDUPTHER

## 2020-12-21 NOTE — TELEPHONE ENCOUNTER
Received request via: Pharmacy    Was the patient seen in the last year in this department? No   12/30/2019  Does the patient have an active prescription (recently filled or refills available) for medication(s) requested? No

## 2020-12-31 ENCOUNTER — OFFICE VISIT (OUTPATIENT)
Dept: MEDICAL GROUP | Facility: LAB | Age: 55
End: 2020-12-31
Payer: COMMERCIAL

## 2020-12-31 VITALS
OXYGEN SATURATION: 97 % | TEMPERATURE: 99.2 F | BODY MASS INDEX: 20.8 KG/M2 | RESPIRATION RATE: 16 BRPM | SYSTOLIC BLOOD PRESSURE: 100 MMHG | HEIGHT: 62 IN | WEIGHT: 113 LBS | HEART RATE: 78 BPM | DIASTOLIC BLOOD PRESSURE: 50 MMHG

## 2020-12-31 DIAGNOSIS — Z13.21 ENCOUNTER FOR VITAMIN DEFICIENCY SCREENING: ICD-10-CM

## 2020-12-31 DIAGNOSIS — E03.9 ACQUIRED HYPOTHYROIDISM: ICD-10-CM

## 2020-12-31 DIAGNOSIS — Z12.31 ENCOUNTER FOR SCREENING MAMMOGRAM FOR BREAST CANCER: ICD-10-CM

## 2020-12-31 DIAGNOSIS — Z13.0 SCREENING FOR DEFICIENCY ANEMIA: ICD-10-CM

## 2020-12-31 DIAGNOSIS — Z13.1 SCREENING FOR DIABETES MELLITUS: ICD-10-CM

## 2020-12-31 DIAGNOSIS — E78.5 DYSLIPIDEMIA: ICD-10-CM

## 2020-12-31 PROBLEM — R73.03 PREDIABETES: Status: RESOLVED | Noted: 2017-12-08 | Resolved: 2020-12-31

## 2020-12-31 PROBLEM — Z00.00 PREVENTATIVE HEALTH CARE: Status: RESOLVED | Noted: 2017-12-08 | Resolved: 2020-12-31

## 2020-12-31 PROBLEM — Z30.09 FAMILY PLANNING: Status: RESOLVED | Noted: 2017-12-08 | Resolved: 2020-12-31

## 2020-12-31 PROCEDURE — 99214 OFFICE O/P EST MOD 30 MIN: CPT | Performed by: FAMILY MEDICINE

## 2020-12-31 RX ORDER — LEVOTHYROXINE SODIUM 0.07 MG/1
75 TABLET ORAL
Qty: 90 TAB | Refills: 3 | Status: SHIPPED | OUTPATIENT
Start: 2020-12-31 | End: 2022-03-23

## 2020-12-31 ASSESSMENT — PATIENT HEALTH QUESTIONNAIRE - PHQ9: CLINICAL INTERPRETATION OF PHQ2 SCORE: 0

## 2020-12-31 NOTE — PROGRESS NOTES
"Subjective:     Chief Complaint   Patient presents with   • Medication Follow-up     lab review       Cecilia Taveras is a 55 y.o. female here today for evaluation and management of:    Acquired hypothyroidism  Stable. Currently taking levothyroxine 75 mcg daily as prescribed.  Denies palpitations, skin changes, temperature intolerance, or changes in bowel habits        Dyslipidemia  Patient has been working out more regularly and exercising.  She is lost some weight and is hoping that this will have improved her lipid panel       Allergies   Allergen Reactions   • Pcn [Penicillins] Shortness of Breath and Rash     Rash, SOB, tachycardia       Current medicines (including changes today)  Current Outpatient Medications   Medication Sig Dispense Refill   • levothyroxine (SYNTHROID) 75 MCG Tab Take 1 Tab by mouth Every morning on an empty stomach. 90 Tab 3     No current facility-administered medications for this visit.        She  has a past medical history of Thyroid disease.    Patient Active Problem List    Diagnosis Date Noted   • Inverted nipple 03/14/2019   • Left breast mass 03/14/2019   • Dyslipidemia 12/20/2018   • Nevus 06/28/2018   • Acquired hypothyroidism 12/08/2017       ROS   No fever or chills.  No nausea or vomiting.  No chest pain or palpitations.  No cough or SOB.  No pain with urination or hematuria.  No black or bloody stools.       Objective:     /50 (BP Location: Right arm, Patient Position: Sitting, BP Cuff Size: Adult)   Pulse 78   Temp 37.3 °C (99.2 °F) (Temporal)   Resp 16   Ht 1.575 m (5' 2\")   Wt 51.3 kg (113 lb)   SpO2 97%  Body mass index is 20.67 kg/m².   Physical Exam:  Well developed, well nourished.  Alert, oriented in no acute distress.  Eye contact is good, speech goal directed, affect calm  Eyes: conjunctiva non-injected, sclera non-icteric.  Neck Supple.  No adenopathy or masses in the neck or supraclavicular regions.  Mild diffuse thyromegaly  Lungs: clear to " auscultation bilaterally with good excursion. No wheezes or rhonchi  CV: regular rate and rhythm. No murmur            Assessment and Plan:   The following treatment plan was discussed    1. Acquired hypothyroidism  Check thyroid labs and adjust levothyroxine dose as needed  - TSH; Future  - FREE THYROXINE; Future  - levothyroxine (SYNTHROID) 75 MCG Tab; Take 1 Tab by mouth Every morning on an empty stomach.  Dispense: 90 Tab; Refill: 3    2. Dyslipidemia  Check lipid panel.  Await results.  We will discuss at her Pap smear next week.  Mediterranean diet information given  - Lipid Profile; Future    3. Screening for deficiency anemia  Screening labs ordered.  Await results for counseling.    - CBC WITH DIFFERENTIAL; Future    4. Screening for diabetes mellitus  Screening labs ordered.  Await results for counseling.    - Comp Metabolic Panel; Future  - HEMOGLOBIN A1C; Future    5. Encounter for vitamin deficiency screening  Screening labs ordered.  Await results for counseling.    - VITAMIN D,25 HYDROXY; Future    6. Encounter for screening mammogram for breast cancer    - MA-SCREENING MAMMO BILAT W/CAD; Future    Any change or worsening of signs or symptoms, patient encouraged to follow-up or report to the emergency room for further evaluation. Patient understands and agrees.    Followup: Return in about 1 week (around 1/7/2021) for PAP.

## 2020-12-31 NOTE — PATIENT INSTRUCTIONS
Mediterranean Diet  A Mediterranean diet refers to food and lifestyle choices that are based on the traditions of countries located on the [place]. This way of eating has been shown to help prevent certain conditions and improve outcomes for people who have chronic diseases, like kidney disease and heart disease.  What are tips for following this plan?  Lifestyle  · Cook and eat meals together with your family, when possible.  · Drink enough fluid to keep your urine clear or pale yellow.  · Be physically active every day. This includes:  ? Aerobic exercise like running or swimming.  ? Leisure activities like gardening, walking, or housework.  · Get 7-8 hours of sleep each night.  · If recommended by your health care provider, drink red wine in moderation. This means 1 glass a day for nonpregnant women and 2 glasses a day for men. A glass of wine equals 5 oz (150 mL).  Reading food labels    · Check the serving size of packaged foods. For foods such as rice and pasta, the serving size refers to the amount of cooked product, not dry.  · Check the total fat in packaged foods. Avoid foods that have saturated fat or trans fats.  · Check the ingredients list for added sugars, such as corn syrup.  Shopping  · At the grocery store, buy most of your food from the areas near the walls of the store. This includes:  ? Fresh fruits and vegetables (produce).  ? Grains, beans, nuts, and seeds. Some of these may be available in unpackaged forms or large amounts (in bulk).  ? Fresh seafood.  ? Poultry and eggs.  ? Low-fat dairy products.  · Buy whole ingredients instead of prepackaged foods.  · Buy fresh fruits and vegetables in-season from local farmers markets.  · Buy frozen fruits and vegetables in resealable bags.  · If you do not have access to quality fresh seafood, buy precooked frozen shrimp or canned fish, such as tuna, salmon, or sardines.  · Buy small amounts of raw or cooked vegetables, salads, or olives from  the deli or salad bar at your store.  · Stock your pantry so you always have certain foods on hand, such as olive oil, canned tuna, canned tomatoes, rice, pasta, and beans.  Cooking  · Cook foods with extra-virgin olive oil instead of using butter or other vegetable oils.  · Have meat as a side dish, and have vegetables or grains as your main dish. This means having meat in small portions or adding small amounts of meat to foods like pasta or stew.  · Use beans or vegetables instead of meat in common dishes like chili or lasagna.  · [place] with different cooking methods. Try roasting or broiling vegetables instead of steaming or sautéeing them.  · Add frozen vegetables to soups, stews, pasta, or rice.  · Add nuts or seeds for added healthy fat at each meal. You can add these to yogurt, salads, or vegetable dishes.  · Marinate fish or vegetables using olive oil, lemon juice, garlic, and fresh herbs.  Meal planning    · Plan to eat 1 vegetarian meal one day each week. Try to work up to 2 vegetarian meals, if possible.  · Eat seafood 2 or more times a week.  · Have healthy snacks readily available, such as:  ? Vegetable sticks with hummus.  ? Greek yogurt.  ? Fruit and nut trail mix.  · Eat balanced meals throughout the week. This includes:  ? Fruit: 2-3 servings a day  ? Vegetables: 4-5 servings a day  ? Low-fat dairy: 2 servings a day  ? Fish, poultry, or lean meat: 1 serving a day  ? Beans and legumes: 2 or more servings a week  ? Nuts and seeds: 1-2 servings a day  ? Whole grains: 6-8 servings a day  ? Extra-virgin olive oil: 3-4 servings a day  · Limit red meat and sweets to only a few servings a month  What are my food choices?  · Mediterranean diet  ? Recommended  § Grains: Whole-grain pasta. Brown rice. Bulgar wheat. Polenta. Couscous. Whole-wheat bread. Oatmeal. Quinoa.  § Vegetables: Artichokes. Beets. Broccoli. Cabbage. Carrots. Eggplant. Green beans. Chard. Kale. Spinach. Onions. Leeks. Peas. Squash.  Tomatoes. Peppers. Radishes.  § Fruits: Apples. Apricots. Avocado. Berries. Bananas. Cherries. Dates. Figs. Grapes. [place]. Melon. Oranges. Peaches. Plums. Pomegranate.  § Meats and other protein foods: Beans. Almonds. Sunflower seeds. Pine nuts. Peanuts. Cod. [place]. Scallops. Shrimp. Tuna. Tilapia. Clams. Oysters. Eggs.  § Dairy: Low-fat milk. Cheese. Greek yogurt.  § Beverages: Water. Red wine. Herbal tea.  § Fats and oils: Extra virgin olive oil. Avocado oil. Grape seed oil.  § Sweets and desserts: Greek yogurt with honey. Baked apples. Poached pears. Trail mix.  § Seasoning and other foods: Basil. Cilantro. Coriander. Cumin. Mint. Parsley. [place]. Rosemary. Tarragon. Garlic. Oregano. Thyme. Pepper. Balsalmic vinegar. Tahini. Hummus. Tomato sauce. Olives. Mushrooms.  ? Limit these  § Grains: Prepackaged pasta or rice dishes. Prepackaged cereal with added sugar.  § Vegetables: Deep fried potatoes (french fries).  § Fruits: Fruit canned in syrup.  § Meats and other protein foods: Beef. Pork. Lamb. Poultry with skin. Hot dogs. [place].  § Dairy: Ice cream. Sour cream. Whole milk.  § Beverages: Juice. Sugar-sweetened soft drinks. Beer. Liquor and spirits.  § Fats and oils: Butter. Canola oil. Vegetable oil. Beef fat (tallow). Lard.  § Sweets and desserts: Cookies. Cakes. Pies. Candy.  § Seasoning and other foods: Mayonnaise. Premade sauces and marinades.  The items listed may not be a complete list. Talk with your dietitian about what dietary choices are right for you.  Summary  · The Mediterranean diet includes both food and lifestyle choices.  · Eat a variety of fresh fruits and vegetables, beans, nuts, seeds, and whole grains.  · Limit the amount of red meat and sweets that you eat.  · Talk with your health care provider about whether it is safe for you to drink red wine in moderation. This means 1 glass a day for nonpregnant women and 2 glasses a day for men. A glass of wine equals 5 oz (150 mL).  This information  is not intended to replace advice given to you by your health care provider. Make sure you discuss any questions you have with your health care provider.  Document Released: 08/10/2017 Document Revised: 08/17/2017 Document Reviewed: 08/10/2017  [employer] Patient Education © 2020 [employer]

## 2020-12-31 NOTE — ASSESSMENT & PLAN NOTE
Patient has been working out more regularly and exercising.  She is lost some weight and is hoping that this will have improved her lipid panel

## 2021-01-02 ENCOUNTER — HOSPITAL ENCOUNTER (OUTPATIENT)
Dept: LAB | Facility: MEDICAL CENTER | Age: 56
End: 2021-01-02
Attending: FAMILY MEDICINE
Payer: COMMERCIAL

## 2021-01-02 DIAGNOSIS — Z13.0 SCREENING FOR DEFICIENCY ANEMIA: ICD-10-CM

## 2021-01-02 DIAGNOSIS — Z13.21 ENCOUNTER FOR VITAMIN DEFICIENCY SCREENING: ICD-10-CM

## 2021-01-02 DIAGNOSIS — Z13.1 SCREENING FOR DIABETES MELLITUS: ICD-10-CM

## 2021-01-02 DIAGNOSIS — E03.9 ACQUIRED HYPOTHYROIDISM: ICD-10-CM

## 2021-01-02 DIAGNOSIS — E78.5 DYSLIPIDEMIA: ICD-10-CM

## 2021-01-02 LAB
25(OH)D3 SERPL-MCNC: 23 NG/ML (ref 30–100)
ALBUMIN SERPL BCP-MCNC: 4.2 G/DL (ref 3.2–4.9)
ALBUMIN/GLOB SERPL: 1.6 G/DL
ALP SERPL-CCNC: 76 U/L (ref 30–99)
ALT SERPL-CCNC: 22 U/L (ref 2–50)
ANION GAP SERPL CALC-SCNC: 8 MMOL/L (ref 7–16)
AST SERPL-CCNC: 16 U/L (ref 12–45)
BASOPHILS # BLD AUTO: 0.9 % (ref 0–1.8)
BASOPHILS # BLD: 0.04 K/UL (ref 0–0.12)
BILIRUB SERPL-MCNC: 0.4 MG/DL (ref 0.1–1.5)
BUN SERPL-MCNC: 19 MG/DL (ref 8–22)
CALCIUM SERPL-MCNC: 9 MG/DL (ref 8.5–10.5)
CHLORIDE SERPL-SCNC: 103 MMOL/L (ref 96–112)
CHOLEST SERPL-MCNC: 207 MG/DL (ref 100–199)
CO2 SERPL-SCNC: 27 MMOL/L (ref 20–33)
CREAT SERPL-MCNC: 0.5 MG/DL (ref 0.5–1.4)
EOSINOPHIL # BLD AUTO: 0.17 K/UL (ref 0–0.51)
EOSINOPHIL NFR BLD: 3.8 % (ref 0–6.9)
ERYTHROCYTE [DISTWIDTH] IN BLOOD BY AUTOMATED COUNT: 49 FL (ref 35.9–50)
EST. AVERAGE GLUCOSE BLD GHB EST-MCNC: 123 MG/DL
FASTING STATUS PATIENT QL REPORTED: NORMAL
GLOBULIN SER CALC-MCNC: 2.7 G/DL (ref 1.9–3.5)
GLUCOSE SERPL-MCNC: 93 MG/DL (ref 65–99)
HBA1C MFR BLD: 5.9 % (ref 0–5.6)
HCT VFR BLD AUTO: 43.1 % (ref 37–47)
HDLC SERPL-MCNC: 81 MG/DL
HGB BLD-MCNC: 13.6 G/DL (ref 12–16)
IMM GRANULOCYTES # BLD AUTO: 0.01 K/UL (ref 0–0.11)
IMM GRANULOCYTES NFR BLD AUTO: 0.2 % (ref 0–0.9)
LDLC SERPL CALC-MCNC: 116 MG/DL
LYMPHOCYTES # BLD AUTO: 1.37 K/UL (ref 1–4.8)
LYMPHOCYTES NFR BLD: 30.8 % (ref 22–41)
MCH RBC QN AUTO: 29.6 PG (ref 27–33)
MCHC RBC AUTO-ENTMCNC: 31.6 G/DL (ref 33.6–35)
MCV RBC AUTO: 93.9 FL (ref 81.4–97.8)
MONOCYTES # BLD AUTO: 0.3 K/UL (ref 0–0.85)
MONOCYTES NFR BLD AUTO: 6.7 % (ref 0–13.4)
NEUTROPHILS # BLD AUTO: 2.56 K/UL (ref 2–7.15)
NEUTROPHILS NFR BLD: 57.6 % (ref 44–72)
NRBC # BLD AUTO: 0 K/UL
NRBC BLD-RTO: 0 /100 WBC
PLATELET # BLD AUTO: 277 K/UL (ref 164–446)
PMV BLD AUTO: 9.5 FL (ref 9–12.9)
POTASSIUM SERPL-SCNC: 4 MMOL/L (ref 3.6–5.5)
PROT SERPL-MCNC: 6.9 G/DL (ref 6–8.2)
RBC # BLD AUTO: 4.59 M/UL (ref 4.2–5.4)
SODIUM SERPL-SCNC: 138 MMOL/L (ref 135–145)
T4 FREE SERPL-MCNC: 1.42 NG/DL (ref 0.93–1.7)
TRIGL SERPL-MCNC: 50 MG/DL (ref 0–149)
TSH SERPL DL<=0.005 MIU/L-ACNC: 3.83 UIU/ML (ref 0.38–5.33)
WBC # BLD AUTO: 4.5 K/UL (ref 4.8–10.8)

## 2021-01-02 PROCEDURE — 83036 HEMOGLOBIN GLYCOSYLATED A1C: CPT

## 2021-01-02 PROCEDURE — 36415 COLL VENOUS BLD VENIPUNCTURE: CPT

## 2021-01-02 PROCEDURE — 80061 LIPID PANEL: CPT

## 2021-01-02 PROCEDURE — 84439 ASSAY OF FREE THYROXINE: CPT

## 2021-01-02 PROCEDURE — 80053 COMPREHEN METABOLIC PANEL: CPT

## 2021-01-02 PROCEDURE — 84443 ASSAY THYROID STIM HORMONE: CPT

## 2021-01-02 PROCEDURE — 85025 COMPLETE CBC W/AUTO DIFF WBC: CPT

## 2021-01-02 PROCEDURE — 82306 VITAMIN D 25 HYDROXY: CPT

## 2021-01-06 ENCOUNTER — HOSPITAL ENCOUNTER (OUTPATIENT)
Facility: MEDICAL CENTER | Age: 56
End: 2021-01-06
Attending: FAMILY MEDICINE
Payer: COMMERCIAL

## 2021-01-06 ENCOUNTER — OFFICE VISIT (OUTPATIENT)
Dept: MEDICAL GROUP | Facility: LAB | Age: 56
End: 2021-01-06
Payer: COMMERCIAL

## 2021-01-06 VITALS
WEIGHT: 111 LBS | BODY MASS INDEX: 20.43 KG/M2 | OXYGEN SATURATION: 97 % | SYSTOLIC BLOOD PRESSURE: 110 MMHG | DIASTOLIC BLOOD PRESSURE: 60 MMHG | HEIGHT: 62 IN | RESPIRATION RATE: 16 BRPM | TEMPERATURE: 99 F | HEART RATE: 70 BPM

## 2021-01-06 DIAGNOSIS — Z12.4 SCREENING FOR CERVICAL CANCER: ICD-10-CM

## 2021-01-06 DIAGNOSIS — Z01.419 WELL WOMAN EXAM WITH ROUTINE GYNECOLOGICAL EXAM: ICD-10-CM

## 2021-01-06 DIAGNOSIS — R73.09 ELEVATED GLYCOHEMOGLOBIN: ICD-10-CM

## 2021-01-06 DIAGNOSIS — E78.5 DYSLIPIDEMIA: ICD-10-CM

## 2021-01-06 DIAGNOSIS — E55.9 VITAMIN D DEFICIENCY: ICD-10-CM

## 2021-01-06 DIAGNOSIS — D70.8 OTHER NEUTROPENIA (HCC): ICD-10-CM

## 2021-01-06 DIAGNOSIS — E03.9 ACQUIRED HYPOTHYROIDISM: ICD-10-CM

## 2021-01-06 PROBLEM — N63.20 LEFT BREAST MASS: Status: RESOLVED | Noted: 2019-03-14 | Resolved: 2021-01-06

## 2021-01-06 PROCEDURE — 99396 PREV VISIT EST AGE 40-64: CPT | Performed by: FAMILY MEDICINE

## 2021-01-06 PROCEDURE — 87624 HPV HI-RISK TYP POOLED RSLT: CPT

## 2021-01-06 PROCEDURE — 88175 CYTOPATH C/V AUTO FLUID REDO: CPT

## 2021-01-06 ASSESSMENT — FIBROSIS 4 INDEX: FIB4 SCORE: 0.65

## 2021-01-06 NOTE — PATIENT INSTRUCTIONS
How to Perform the Epley Maneuver  The Epley maneuver is an exercise that relieves symptoms of vertigo. Vertigo is the feeling that you or your surroundings are moving when they are not. When you feel vertigo, you may feel like the room is spinning and have trouble walking. Dizziness is a little different than vertigo. When you are dizzy, you may feel unsteady or light-headed.  You can do this maneuver at home whenever you have symptoms of vertigo. You can do it up to 3 times a day until your symptoms go away.  Even though the Epley maneuver may relieve your vertigo for a few weeks, it is possible that your symptoms will return. This maneuver relieves vertigo, but it does not relieve dizziness.  What are the risks?  If it is done correctly, the Epley maneuver is considered safe. Sometimes it can lead to dizziness or nausea that goes away after a short time. If you develop other symptoms, such as changes in vision, weakness, or numbness, stop doing the maneuver and call your health care provider.  How to perform the Epley maneuver  1. Sit on the edge of a bed or table with your back straight and your legs extended or hanging over the edge of the bed or table.  2. Turn your head group home toward the affected ear or side.  3. Lie backward quickly with your head turned until you are lying flat on your back. You may want to position a pillow under your shoulders.  4. Hold this position for 30 seconds. You may experience an attack of vertigo. This is normal.  5. Turn your head to the opposite direction until your unaffected ear is facing the floor.  6. Hold this position for 30 seconds. You may experience an attack of vertigo. This is normal. Hold this position until the vertigo stops.  7. Turn your whole body to the same side as your head. Hold for another 30 seconds.  8. Sit back up.  You can repeat this exercise up to 3 times a day.  Follow these instructions at home:  · After doing the Epley maneuver, you can return to  your normal activities.  · Ask your health care provider if there is anything you should do at home to prevent vertigo. He or she may recommend that you:  ? Keep your head raised (elevated) with two or more pillows while you sleep.  ? Do not sleep on the side of your affected ear.  ? Get up slowly from bed.  ? Avoid sudden movements during the day.  ? Avoid extreme head movement, like looking up or bending over.  Contact a health care provider if:  · Your vertigo gets worse.  · You have other symptoms, including:  ? Nausea.  ? Vomiting.  ? Headache.  Get help right away if:  · You have vision changes.  · You have a severe or worsening headache or neck pain.  · You cannot stop vomiting.  · You have new numbness or weakness in any part of your body.  Summary  · Vertigo is the feeling that you or your surroundings are moving when they are not.  · The Epley maneuver is an exercise that relieves symptoms of vertigo.  · If the Epley maneuver is done correctly, it is considered safe. You can do it up to 3 times a day.  This information is not intended to replace advice given to you by your health care provider. Make sure you discuss any questions you have with your health care provider.  Document Released: 12/23/2014 Document Revised: 11/30/2018 Document Reviewed: 11/07/2017  [employer] Patient Education © 2020 [employer]  Benign Positional Vertigo  Vertigo is the feeling that you or your surroundings are moving when they are not. Benign positional vertigo is the most common form of vertigo. This is usually a harmless condition (benign). This condition is positional. This means that symptoms are triggered by certain movements and positions.  This condition can be dangerous if it occurs while you are doing something that could cause harm to you or others. This includes activities such as driving or operating machinery.  What are the causes?  In many cases, the cause of this condition is not known. It may be caused by a  disturbance in an area of the inner ear that helps your brain to sense movement and balance. This disturbance can be caused by:  · Viral infection (labyrinthitis).  · Head injury.  · Repetitive motion, such as jumping, dancing, or running.  What increases the risk?  You are more likely to develop this condition if:  · You are a woman.  · You are 50 years of age or older.  What are the signs or symptoms?  Symptoms of this condition usually happen when you move your head or your eyes in different directions. Symptoms may start suddenly, and usually last for less than a minute. They include:  · Loss of balance and falling.  · Feeling like you are spinning or moving.  · Feeling like your surroundings are spinning or moving.  · Nausea and vomiting.  · Blurred vision.  · Dizziness.  · Involuntary eye movement (nystagmus).  Symptoms can be mild and cause only minor problems, or they can be severe and interfere with daily life. Episodes of benign positional vertigo may return (recur) over time. Symptoms may improve over time.  How is this diagnosed?  This condition may be diagnosed based on:  · Your medical history.  · Physical exam of the head, neck, and ears.  · Tests, such as:  ? MRI.  ? CT scan.  ? Eye movement tests. Your health care provider may ask you to change positions quickly while he or she watches you for symptoms of benign positional vertigo, such as nystagmus. Eye movement may be tested with a variety of exams that are designed to evaluate or stimulate vertigo.  ? An electroencephalogram (EEG). This records electrical activity in your brain.  ? Hearing tests.  You may be referred to a health care provider who specializes in ear, nose, and throat (ENT) problems (otolaryngologist) or a provider who specializes in disorders of the nervous system (neurologist).  How is this treated?    This condition may be treated in a session in which your health care provider moves your head in specific positions to adjust  "your inner ear back to normal. Treatment for this condition may take several sessions. Surgery may be needed in severe cases, but this is rare.   In some cases, benign positional vertigo may resolve on its own in 2-4 weeks.  Follow these instructions at home:  Safety  · Move slowly. Avoid sudden body or head movements or certain positions, as told by your health care provider.  · Avoid driving until your health care provider says it is safe for you to do so.  · Avoid operating heavy machinery until your health care provider says it is safe for you to do so.  · Avoid doing any tasks that would be dangerous to you or others if vertigo occurs.  · If you have trouble walking or keeping your balance, try using a cane for stability. If you feel dizzy or unstable, sit down right away.  · Return to your normal activities as told by your health care provider. Ask your health care provider what activities are safe for you.  General instructions  · Take over-the-counter and prescription medicines only as told by your health care provider.  · Drink enough fluid to keep your urine pale yellow.  · Keep all follow-up visits as told by your health care provider. This is important.  Contact a health care provider if:  · You have a fever.  · Your condition gets worse or you develop new symptoms.  · Your family or friends notice any behavioral changes.  · You have nausea or vomiting that gets worse.  · You have numbness or a \"pins and needles\" sensation.  Get help right away if you:  · Have difficulty speaking or moving.  · Are always dizzy.  · Faint.  · Develop severe headaches.  · Have weakness in your legs or arms.  · Have changes in your hearing or vision.  · Develop a stiff neck.  · Develop sensitivity to light.  Summary  · Vertigo is the feeling that you or your surroundings are moving when they are not. Benign positional vertigo is the most common form of vertigo.  · The cause of this condition is not known. It may be caused by " a disturbance in an area of the inner ear that helps your brain to sense movement and balance.  · Symptoms include loss of balance and falling, feeling that you or your surroundings are moving, nausea and vomiting, and blurred vision.  · This condition can be diagnosed based on symptoms, physical exam, and other tests, such as MRI, CT scan, eye movement tests, and hearing tests.  · Follow safety instructions as told by your health care provider. You will also be told when to contact your health care provider in case of problems.  This information is not intended to replace advice given to you by your health care provider. Make sure you discuss any questions you have with your health care provider.  Document Released: 09/25/2007 Document Revised: 05/29/2019 Document Reviewed: 05/29/2019  [employer] Patient Education © 2020 [employer]  Preventive Care 40-64 Years Old, Female  Preventive care refers to visits with your health care provider and lifestyle choices that can promote health and wellness. This includes:  · A yearly physical exam. This may also be called an annual well check.  · Regular dental visits and eye exams.  · Immunizations.  · Screening for certain conditions.  · Healthy lifestyle choices, such as eating a healthy diet, getting regular exercise, not using drugs or products that contain nicotine and tobacco, and limiting alcohol use.  What can I expect for my preventive care visit?  Physical exam  Your health care provider will check your:  · Height and weight. This may be used to calculate body mass index (BMI), which tells if you are at a healthy weight.  · Heart rate and blood pressure.  · Skin for abnormal spots.  Counseling  Your health care provider may ask you questions about your:  · Alcohol, tobacco, and drug use.  · Emotional well-being.  · Home and relationship well-being.  · Sexual activity.  · Eating habits.  · Work and work environment.  · Method of birth control.  · Menstrual  cycle.  · Pregnancy history.  What immunizations do I need?    Influenza (flu) vaccine  · This is recommended every year.  Tetanus, diphtheria, and pertussis (Tdap) vaccine  · You may need a Td booster every 10 years.  Varicella (chickenpox) vaccine  · You may need this if you have not been vaccinated.  Zoster (shingles) vaccine  · You may need this after age 60.  Measles, mumps, and rubella (MMR) vaccine  · You may need at least one dose of MMR if you were born in 1957 or later. You may also need a second dose.  Pneumococcal conjugate (PCV13) vaccine  · You may need this if you have certain conditions and were not previously vaccinated.  Pneumococcal polysaccharide (PPSV23) vaccine  · You may need one or two doses if you smoke cigarettes or if you have certain conditions.  Meningococcal conjugate (MenACWY) vaccine  · You may need this if you have certain conditions.  Hepatitis A vaccine  · You may need this if you have certain conditions or if you travel or work in places where you may be exposed to hepatitis A.  Hepatitis B vaccine  · You may need this if you have certain conditions or if you travel or work in places where you may be exposed to hepatitis B.  Haemophilus influenzae type b (Hib) vaccine  · You may need this if you have certain conditions.  Human papillomavirus (HPV) vaccine  · If recommended by your health care provider, you may need three doses over 6 months.  You may receive vaccines as individual doses or as more than one vaccine together in one shot (combination vaccines). Talk with your health care provider about the risks and benefits of combination vaccines.  What tests do I need?  Blood tests  · Lipid and cholesterol levels. These may be checked every 5 years, or more frequently if you are over 50 years old.  · Hepatitis C test.  · Hepatitis B test.  Screening  · Lung cancer screening. You may have this screening every year starting at age 55 if you have a 30-pack-year history of smoking and  currently smoke or have quit within the past 15 years.  · Colorectal cancer screening. All adults should have this screening starting at age 50 and continuing until age 75. Your health care provider may recommend screening at age 45 if you are at increased risk. You will have tests every 1-10 years, depending on your results and the type of screening test.  · Diabetes screening. This is done by checking your blood sugar (glucose) after you have not eaten for a while (fasting). You may have this done every 1-3 years.  · Mammogram. This may be done every 1-2 years. Talk with your health care provider about when you should start having regular mammograms. This may depend on whether you have a family history of breast cancer.  · BRCA-related cancer screening. This may be done if you have a family history of breast, ovarian, tubal, or peritoneal cancers.  · Pelvic exam and Pap test. This may be done every 3 years starting at age 21. Starting at age 30, this may be done every 5 years if you have a Pap test in combination with an HPV test.  Other tests  · Sexually transmitted disease (STD) testing.  · Bone density scan. This is done to screen for osteoporosis. You may have this scan if you are at high risk for osteoporosis.  Follow these instructions at home:  Eating and drinking  · Eat a diet that includes fresh fruits and vegetables, whole grains, lean protein, and low-fat dairy.  · Take vitamin and mineral supplements as recommended by your health care provider.  · Do not drink alcohol if:  ? Your health care provider tells you not to drink.  ? You are pregnant, may be pregnant, or are planning to become pregnant.  · If you drink alcohol:  ? Limit how much you have to 0-1 drink a day.  ? Be aware of how much alcohol is in your drink. In the [place] one drink equals one 12 oz bottle of beer (355 mL), one 5 oz glass of wine (148 mL), or one 1½ oz glass of hard liquor (44 mL).  Lifestyle  · Take daily care of your teeth and  gums.  · Stay active. Exercise for at least 30 minutes on 5 or more days each week.  · Do not use any products that contain nicotine or tobacco, such as cigarettes, e-cigarettes, and chewing tobacco. If you need help quitting, ask your health care provider.  · If you are sexually active, practice safe sex. Use a condom or other form of birth control (contraception) in order to prevent pregnancy and STIs (sexually transmitted infections).  · If told by your health care provider, take low-dose aspirin daily starting at age 50.  What's next?  · Visit your health care provider once a year for a well check visit.  · Ask your health care provider how often you should have your eyes and teeth checked.  · Stay up to date on all vaccines.  This information is not intended to replace advice given to you by your health care provider. Make sure you discuss any questions you have with your health care provider.  Document Released: 01/13/2017 Document Revised: 08/29/2019 Document Reviewed: 08/29/2019  [employer] Patient Education © 2020 [employer]  Vitamin D Deficiency  Vitamin D deficiency is when your body does not have enough vitamin D. Vitamin D is important to your body because:  · It helps your body use other minerals.  · It helps to keep your bones strong and healthy.  · It may help to prevent some diseases.  · It helps your heart and other muscles work well.  Not getting enough vitamin D can make your bones soft. It can also cause other health problems.  What are the causes?  This condition may be caused by:  · Not eating enough foods that contain vitamin D.  · Not getting enough sun.  · Having diseases that make it hard for your body to absorb vitamin D.  · Having a surgery in which a part of the stomach or a part of the small intestine is removed.  · Having kidney disease or liver disease.  What increases the risk?  You are more likely to get this condition if:  · You are older.  · You do not spend much time  outdoors.  · You live in a nursing home.  · You have had broken bones.  · You have weak or thin bones (osteoporosis).  · You have a disease or condition that changes how your body absorbs vitamin D.  · You have dark skin.  · You take certain medicines.  · You are overweight or obese.  What are the signs or symptoms?  · In mild cases, there may not be any symptoms. If the condition is very bad, symptoms may include:  ? Bone pain.  ? Muscle pain.  ? Falling often.  ? Broken bones caused by a minor injury.  How is this treated?  Treatment may include taking supplements as told by your doctor. Your doctor will tell you what dose is best for you. Supplements may include:  · Vitamin D.  · Calcium.  Follow these instructions at home:  Eating and drinking    · Eat foods that contain vitamin D, such as:  ? Dairy products, cereals, or juices with added vitamin D. Check the label.  ? Fish, such as salmon or trout.  ? Eggs.  ? Oysters.  ? Mushrooms.  The items listed above may not be a complete list of what you can eat and drink. Contact a dietitian for more options.  General instructions  · Take medicines and supplements only as told by your doctor.  · Get regular, safe exposure to natural sunlight.  · Do not use a tanning bed.  · Maintain a healthy weight. Lose weight if needed.  · Keep all follow-up visits as told by your doctor. This is important.  How is this prevented?  · You can get vitamin D by:  ? Eating foods that naturally contain vitamin D.  ? Eating or drinking products that have vitamin D added to them, such as cereals, juices, and milk.  ? Taking vitamin D or a multivitamin that contains vitamin D.  ? Being in the sun. Your body makes vitamin D when your skin is exposed to sunlight. Your body changes the sunlight into a form of the vitamin that it can use.  Contact a doctor if:  · Your symptoms do not go away.  · You feel sick to your stomach (nauseous).  · You throw up (vomit).  · You poop less often than  normal, or you have trouble pooping (constipation).  Summary  · Vitamin D deficiency is when your body does not have enough vitamin D.  · Vitamin D helps to keep your bones strong and healthy.  · This condition is often treated by taking a supplement.  · Your doctor will tell you what dose is best for you.  This information is not intended to replace advice given to you by your health care provider. Make sure you discuss any questions you have with your health care provider.  Document Released: 12/06/2012 Document Revised: 08/26/2019 Document Reviewed: 08/26/2019  [employer] Patient Education © 2020 [employer]

## 2021-01-07 LAB
CYTOLOGY REG CYTOL: NORMAL
HPV HR 12 DNA CVX QL NAA+PROBE: NEGATIVE
HPV16 DNA SPEC QL NAA+PROBE: NEGATIVE
HPV18 DNA SPEC QL NAA+PROBE: NEGATIVE
SPECIMEN SOURCE: NORMAL

## 2021-01-07 NOTE — PROGRESS NOTES
SUBJECTIVE:   Chief Complaint   Patient presents with   • Gynecologic Exam       55 y.o. female for annual routine gynecologic exam    OB History   No obstetric history on file.      Social History     Substance and Sexual Activity   Sexual Activity Yes   • Partners: Male   • Birth control/protection: Pill     No significant bloating/fluid retention, pelvic pain, or dyspareunia. No vaginal discharge   No breast tenderness, mass, nipple discharge, changes in size or contour, or abnormal cyclic discomfort.        ROS:    No urinary tract symptoms, no incontinence.   No abdominal pain, change in bowel habits, black or bloody stools.    No unusual headaches, no visual changes, menstrual migraines   No prolonged cough. No dyspnea or chest pain on exertion.  No depression, labile mood, anxiety ,libido changes, insomnia.  No new/concerning skin lesions,         Social History     Tobacco Use   • Smoking status: Never Smoker   • Smokeless tobacco: Never Used   Substance Use Topics   • Alcohol use: No   • Drug use: No       Patient Active Problem List    Diagnosis Date Noted   • Other neutropenia (HCC) 2021   • Elevated glycohemoglobin 2021   • Vitamin D deficiency 2021   • Inverted nipple 2019   • Dyslipidemia 2018   • Nevus 2018   • Acquired hypothyroidism 2017       Past Medical History:   Diagnosis Date   • Thyroid disease      History reviewed. No pertinent surgical history.      Family History   Problem Relation Age of Onset   • Diabetes Mother    • Diabetes Father    • Diabetes Maternal Uncle    • Diabetes Maternal Grandmother    • Diabetes Paternal Grandfather    • Cancer Neg Hx    • Heart Disease Neg Hx    • Hypertension Neg Hx    • Stroke Neg Hx      Family Status   Relation Name Status   • Mo  Alive   • Fa     • Sis / Alive   • Sis  Alive   • MUnc  (Not Specified)   • MGMo  (Not Specified)   • PGFa  (Not Specified)   • Neg Hx  (Not Specified)         Current  "medicines (including changes today)  Current Outpatient Medications   Medication Sig Dispense Refill   • levothyroxine (SYNTHROID) 75 MCG Tab Take 1 Tab by mouth Every morning on an empty stomach. 90 Tab 3     No current facility-administered medications for this visit.            Allergies: Pcn [penicillins]     Preventive Care:  Health Maintenance Topics with due status: Overdue       Topic Date Due    MAMMOGRAM 04/08/2020       OBJECTIVE:   /60 (BP Location: Right arm, Patient Position: Sitting, BP Cuff Size: Adult)   Pulse 70   Temp 37.2 °C (99 °F) (Temporal)   Resp 16   Ht 1.575 m (5' 2\")   Wt 50.3 kg (111 lb)   LMP  (LMP Unknown)   SpO2 97%   BMI 20.30 kg/m²   Body mass index is 20.3 kg/m².      Gen: Well developed, well nourished in no acute distress.   Skin: Pink, warm, and dry. No rashes  Eyes: conjunctiva non-injected, sclera non-icteric. EOMs intact.   Nasal mucosa without edema nor erythema. No facial tenderness  Ears:Pinna normal. TM pearly gray.   Nose: Nares patent.  No discharge.  Oral mucous membranes pink and moist with no lesions.  Neck: Supple, trachea midline. No adenopathy or masses in the neck or supraclavicular regions. No thyromegaly.  Lungs: Effort is normal. Clear to auscultation bilaterally with good excursion.  CV: regular rate and rhythm. No murmur.  Abdomen: soft, nontender, + BS. No HSM.  No CVAT  Ext: no edema, color normal, vascularity normal, temperature normal  Alert and oriented Eye contact is good, speech goal directed, affect calm     Breast Exam: Performed with instruction during examination. No axillary lymphadenopathy, no skin changes, no dominant masses. No nipple retraction  Pelvic Exam:  Normal external genitalia with no lesions. Normal vaginal mucosa with normal rugation and no discharge. Cervix with no visible lesions. No cervical motion tenderness. Uterus is normal sized with no masses. No adnexal tenderness or enlargement appreciated. Pap is obtained and " the specimen was sent to lab      <ASSESSMENT and PLAN>  1. Well woman exam with routine gynecological exam     2. Other neutropenia (HCC)  CBC WITH DIFFERENTIAL   3. Elevated glycohemoglobin  HEMOGLOBIN A1C   4. Screening for cervical cancer  THINPREP PAP WITH HPV   5. Dyslipidemia     6. Vitamin D deficiency     7. Acquired hypothyroidism         Discussed  breast self exam, mammography screening, adequate intake of calcium and vitamin D, diet and exercise     Return in about 1 year (around 1/6/2022) for bpp.

## 2021-01-12 ENCOUNTER — HOSPITAL ENCOUNTER (OUTPATIENT)
Dept: RADIOLOGY | Facility: MEDICAL CENTER | Age: 56
End: 2021-01-12
Attending: FAMILY MEDICINE
Payer: COMMERCIAL

## 2021-01-12 DIAGNOSIS — Z12.31 ENCOUNTER FOR SCREENING MAMMOGRAM FOR BREAST CANCER: ICD-10-CM

## 2021-01-12 PROCEDURE — 77063 BREAST TOMOSYNTHESIS BI: CPT

## 2021-04-03 ENCOUNTER — HOSPITAL ENCOUNTER (OUTPATIENT)
Dept: LAB | Facility: MEDICAL CENTER | Age: 56
End: 2021-04-03
Attending: FAMILY MEDICINE
Payer: COMMERCIAL

## 2021-04-03 DIAGNOSIS — D70.8 OTHER NEUTROPENIA (HCC): ICD-10-CM

## 2021-04-03 DIAGNOSIS — R73.09 ELEVATED GLYCOHEMOGLOBIN: ICD-10-CM

## 2021-04-03 LAB
BASOPHILS # BLD AUTO: 0.8 % (ref 0–1.8)
BASOPHILS # BLD: 0.03 K/UL (ref 0–0.12)
EOSINOPHIL # BLD AUTO: 0.11 K/UL (ref 0–0.51)
EOSINOPHIL NFR BLD: 2.9 % (ref 0–6.9)
ERYTHROCYTE [DISTWIDTH] IN BLOOD BY AUTOMATED COUNT: 46.9 FL (ref 35.9–50)
EST. AVERAGE GLUCOSE BLD GHB EST-MCNC: 148 MG/DL
HBA1C MFR BLD: 6.8 % (ref 4–5.6)
HCT VFR BLD AUTO: 42.6 % (ref 37–47)
HGB BLD-MCNC: 13.6 G/DL (ref 12–16)
IMM GRANULOCYTES # BLD AUTO: 0.01 K/UL (ref 0–0.11)
IMM GRANULOCYTES NFR BLD AUTO: 0.3 % (ref 0–0.9)
LYMPHOCYTES # BLD AUTO: 1.44 K/UL (ref 1–4.8)
LYMPHOCYTES NFR BLD: 37.5 % (ref 22–41)
MCH RBC QN AUTO: 29.4 PG (ref 27–33)
MCHC RBC AUTO-ENTMCNC: 31.9 G/DL (ref 33.6–35)
MCV RBC AUTO: 92 FL (ref 81.4–97.8)
MONOCYTES # BLD AUTO: 0.36 K/UL (ref 0–0.85)
MONOCYTES NFR BLD AUTO: 9.4 % (ref 0–13.4)
NEUTROPHILS # BLD AUTO: 1.89 K/UL (ref 2–7.15)
NEUTROPHILS NFR BLD: 49.1 % (ref 44–72)
NRBC # BLD AUTO: 0 K/UL
NRBC BLD-RTO: 0 /100 WBC
PLATELET # BLD AUTO: 277 K/UL (ref 164–446)
PMV BLD AUTO: 10.1 FL (ref 9–12.9)
RBC # BLD AUTO: 4.63 M/UL (ref 4.2–5.4)
WBC # BLD AUTO: 3.8 K/UL (ref 4.8–10.8)

## 2021-04-03 PROCEDURE — 36415 COLL VENOUS BLD VENIPUNCTURE: CPT

## 2021-04-03 PROCEDURE — 83036 HEMOGLOBIN GLYCOSYLATED A1C: CPT

## 2021-04-03 PROCEDURE — 85025 COMPLETE CBC W/AUTO DIFF WBC: CPT

## 2021-04-20 ENCOUNTER — OFFICE VISIT (OUTPATIENT)
Dept: MEDICAL GROUP | Facility: LAB | Age: 56
End: 2021-04-20
Payer: COMMERCIAL

## 2021-04-20 ENCOUNTER — HOSPITAL ENCOUNTER (OUTPATIENT)
Facility: MEDICAL CENTER | Age: 56
End: 2021-04-20
Attending: FAMILY MEDICINE
Payer: COMMERCIAL

## 2021-04-20 VITALS
DIASTOLIC BLOOD PRESSURE: 60 MMHG | HEIGHT: 62 IN | BODY MASS INDEX: 19.51 KG/M2 | SYSTOLIC BLOOD PRESSURE: 100 MMHG | TEMPERATURE: 99 F | OXYGEN SATURATION: 97 % | WEIGHT: 106 LBS | RESPIRATION RATE: 16 BRPM | HEART RATE: 61 BPM

## 2021-04-20 DIAGNOSIS — E55.9 VITAMIN D DEFICIENCY: ICD-10-CM

## 2021-04-20 DIAGNOSIS — E03.9 ACQUIRED HYPOTHYROIDISM: ICD-10-CM

## 2021-04-20 DIAGNOSIS — E78.5 DYSLIPIDEMIA: ICD-10-CM

## 2021-04-20 DIAGNOSIS — E11.9 TYPE 2 DIABETES MELLITUS WITHOUT COMPLICATION, WITHOUT LONG-TERM CURRENT USE OF INSULIN (HCC): ICD-10-CM

## 2021-04-20 DIAGNOSIS — D70.8 OTHER NEUTROPENIA (HCC): ICD-10-CM

## 2021-04-20 LAB
APPEARANCE UR: CLEAR
BILIRUB UR QL STRIP.AUTO: NEGATIVE
COLOR UR: YELLOW
CREAT UR-MCNC: 110.91 MG/DL
GLUCOSE BLD-MCNC: 84 MG/DL (ref 70–100)
GLUCOSE UR STRIP.AUTO-MCNC: NEGATIVE MG/DL
KETONES UR STRIP.AUTO-MCNC: NEGATIVE MG/DL
LEUKOCYTE ESTERASE UR QL STRIP.AUTO: NEGATIVE
MICRO URNS: NORMAL
MICROALBUMIN UR-MCNC: 3.1 MG/DL
MICROALBUMIN/CREAT UR: 28 MG/G (ref 0–30)
NITRITE UR QL STRIP.AUTO: NEGATIVE
PH UR STRIP.AUTO: 6 [PH] (ref 5–8)
PROT UR QL STRIP: NEGATIVE MG/DL
RBC UR QL AUTO: NEGATIVE
SP GR UR STRIP.AUTO: 1.02
UROBILINOGEN UR STRIP.AUTO-MCNC: 0.2 MG/DL

## 2021-04-20 PROCEDURE — 99214 OFFICE O/P EST MOD 30 MIN: CPT | Performed by: FAMILY MEDICINE

## 2021-04-20 PROCEDURE — 82570 ASSAY OF URINE CREATININE: CPT

## 2021-04-20 PROCEDURE — 81003 URINALYSIS AUTO W/O SCOPE: CPT

## 2021-04-20 PROCEDURE — 82043 UR ALBUMIN QUANTITATIVE: CPT

## 2021-04-20 ASSESSMENT — FIBROSIS 4 INDEX: FIB4 SCORE: 0.65

## 2021-04-20 ASSESSMENT — PATIENT HEALTH QUESTIONNAIRE - PHQ9: CLINICAL INTERPRETATION OF PHQ2 SCORE: 0

## 2021-04-20 NOTE — PATIENT INSTRUCTIONS
Diabetes, Frequently Asked Questions  WHAT IS DIABETES?  Most of the food we eat is turned into glucose (sugar). Our bodies use it for energy. The pancreas makes a hormone called insulin. It helps glucose get into the cells of our bodies. When you have diabetes, your body either does not make enough insulin or cannot use its own insulin as well as it should. This causes sugars to build up in your blood.  WHAT ARE THE SYMPTOMS OF DIABETES?  · Frequent urination.   · Excessive thirst.   · Unexplained weight loss.   · Extreme hunger.   · Blurred vision.   · Tingling or numbness in hands or feet.   · Feeling very tired much of the time.   · Dry, itchy skin.   · Sores that are slow to heal.   · Yeast infections.   WHAT ARE THE TYPES OF DIABETES?  Type 1 Diabetes   · About 10% of affected people have this type.   · Usually occurs before the age of 30.   · Usually occurs in thin to normal weight people.   Type 2 Diabetes  · About 90% of affected people have this type.   · Usually occurs after the age of 40.   · Usually occurs in overweight people.   · More likely to have:   · A family history of diabetes.   · A history of diabetes during pregnancy (gestational diabetes).   · High blood pressure.   · High cholesterol and triglycerides.   Gestational Diabetes  · Occurs in about 4% of pregnancies.   · Usually goes away after the baby is born.   · More likely to occur in women with:   · Family history of diabetes.   · Previous gestational diabetes.   · Obese.   · Over 25 years old.   WHAT IS PRE-DIABETES?  Pre-diabetes means your blood glucose is higher than normal, but lower than the diabetes range. It also means you are at risk of getting type 2 diabetes and heart disease. If you are told you have pre-diabetes, have your blood glucose checked again in 1 to 2 years.  WHAT IS THE TREATMENT FOR DIABETES?  Treatment is aimed at keeping blood glucose near normal levels at all times. Learning how to manage this yourself is  important in treating diabetes. Depending on the type of diabetes you have, your treatment will include one or more of the following:  · Monitoring your blood glucose.   · Meal planning.   · Exercise.   · Oral medicine (pills) or insulin.   CAN DIABETES BE PREVENTED?  With type 1 diabetes, prevention is more difficult, because the triggers that cause it are not yet known.  With type 2 diabetes, prevention is more likely, with lifestyle changes:  · Maintain a healthy weight.   · Eat healthy.   · Exercise.   IS THERE A CURE FOR DIABETES?  No, there is no cure for diabetes. There is a lot of research going on that is looking for a cure, and progress is being made. Diabetes can be treated and controlled. People with diabetes can manage their diabetes and lead normal, active lives.  SHOULD I BE TESTED FOR DIABETES?  If you are at least 45 years old, you should be tested for diabetes. You should be tested again every 3 years. If you are 45 or older and overweight, you may want to get tested more often. If you are younger than 45, overweight, and have one or more of the following risk factors, you should be tested:  · Family history of diabetes.   · Inactive lifestyle.   · High blood pressure.   WHAT ARE SOME OTHER SOURCES FOR INFORMATION ON DIABETES?  The following organizations may help in your search for more information on diabetes:  National Diabetes Education Program (NDEP)  Internet: http://www.ndep.nih.gov/resources  American Diabetes Association  Internet: http://www.diabetes.org   Juvenile Diabetes Foundation International  Internet: http://www.jdf.org  Document Released: 12/20/2004 Document Revised: 03/11/2013 Document Reviewed: 10/15/2010  ExitCare® Patient Information ©2013 [employer].Diabetes Basics    Diabetes (diabetes mellitus) is a long-term (chronic) disease. It occurs when the body does not properly use sugar (glucose) that is released from food after you eat.  Diabetes may be caused by one or both  of these problems:  · Your pancreas does not make enough of a hormone called insulin.  · Your body does not react in a normal way to insulin that it makes.  Insulin lets sugars (glucose) go into cells in your body. This gives you energy. If you have diabetes, sugars cannot get into cells. This causes high blood sugar (hyperglycemia).  Follow these instructions at home:  How is diabetes treated?  You may need to take insulin or other diabetes medicines daily to keep your blood sugar in balance. Take your diabetes medicines every day as told by your doctor. List your diabetes medicines here:  Diabetes medicines  · Name of medicine: ______________________________  ? Amount (dose): _______________ Time (a.m./p.m.): _______________ Notes: ___________________________________  · Name of medicine: ______________________________  ? Amount (dose): _______________ Time (a.m./p.m.): _______________ Notes: ___________________________________  · Name of medicine: ______________________________  ? Amount (dose): _______________ Time (a.m./p.m.): _______________ Notes: ___________________________________  If you use insulin, you will learn how to give yourself insulin by injection. You may need to adjust the amount based on the food that you eat. List the types of insulin you use here:  Insulin  · Insulin type: ______________________________  ? Amount (dose): _______________ Time (a.m./p.m.): _______________ Notes: ___________________________________  · Insulin type: ______________________________  ? Amount (dose): _______________ Time (a.m./p.m.): _______________ Notes: ___________________________________  · Insulin type: ______________________________  ? Amount (dose): _______________ Time (a.m./p.m.): _______________ Notes: ___________________________________  · Insulin type: ______________________________  ? Amount (dose): _______________ Time (a.m./p.m.): _______________ Notes: ___________________________________  · Insulin  type: ______________________________  ? Amount (dose): _______________ Time (a.m./p.m.): _______________ Notes: ___________________________________  How do I manage my blood sugar?    Check your blood sugar levels using a blood glucose monitor as directed by your doctor.  Your doctor will set treatment goals for you. Generally, you should have these blood sugar levels:  · Before meals (preprandial):  mg/dL (4.4-7.2 mmol/L).  · After meals (postprandial): below 180 mg/dL (10 mmol/L).  · A1c level: less than 7%.  Write down the times that you will check your blood sugar levels:  Blood sugar checks  · Time: _______________ Notes: ___________________________________  · Time: _______________ Notes: ___________________________________  · Time: _______________ Notes: ___________________________________  · Time: _______________ Notes: ___________________________________  · Time: _______________ Notes: ___________________________________  · Time: _______________ Notes: ___________________________________    What do I need to know about low blood sugar?  Low blood sugar is called hypoglycemia. This is when blood sugar is at or below 70 mg/dL (3.9 mmol/L). Symptoms may include:  · Feeling:  ? Hungry.  ? Worried or nervous (anxious).  ? Sweaty and clammy.  ? Confused.  ? Dizzy.  ? Sleepy.  ? Sick to your stomach (nauseous).  · Having:  ? A fast heartbeat.  ? A headache.  ? A change in your vision.  ? Tingling or no feeling (numbness) around the mouth, lips, or tongue.  ? Jerky movements that you cannot control (seizure).  · Having trouble with:  ? Moving (coordination).  ? Sleeping.  ? Passing out (fainting).  ? Getting upset easily (irritability).  Treating low blood sugar  To treat low blood sugar, eat or drink something sugary right away. If you can think clearly and swallow safely, follow the 15:15 rule:  · Take 15 grams of a fast-acting carb (carbohydrate). Talk with your doctor about how much you should  take.  · Some fast-acting carbs are:  ? Sugar tablets (glucose pills). Take 3-4 glucose pills.  ? 6-8 pieces of hard candy.  ? 4-6 oz (120-150 mL) of fruit juice.  ? 4-6 oz (120-150 mL) of regular (not diet) soda.  ? 1 Tbsp (15 mL) honey or sugar.  · Check your blood sugar 15 minutes after you take the carb.  · If your blood sugar is still at or below 70 mg/dL (3.9 mmol/L), take 15 grams of a carb again.  · If your blood sugar does not go above 70 mg/dL (3.9 mmol/L) after 3 tries, get help right away.  · After your blood sugar goes back to normal, eat a meal or a snack within 1 hour.  Treating very low blood sugar  If your blood sugar is at or below 54 mg/dL (3 mmol/L), you have very low blood sugar (severe hypoglycemia). This is an emergency. Do not wait to see if the symptoms will go away. Get medical help right away. Call your local emergency services (911 in the [place] Do not drive yourself to the hospital.  Questions to ask your health care provider  · Do I need to meet with a diabetes educator?  · What equipment will I need to care for myself at home?  · What diabetes medicines do I need? When should I take them?  · How often do I need to check my blood sugar?  · What number can I call if I have questions?  · When is my next doctor's visit?  · Where can I find a support group for people with diabetes?  Where to find more information  · American Diabetes Association: www.diabetes.org  · American Association of Diabetes Educators: www.diabeteseducator.org/patient-resources  Contact a doctor if:  · Your blood sugar is at or above 240 mg/dL (13.3 mmol/L) for 2 days in a row.  · You have been sick or have had a fever for 2 days or more, and you are not getting better.  · You have any of these problems for more than 6 hours:  ? You cannot eat or drink.  ? You feel sick to your stomach (nauseous).  ? You throw up (vomit).  ? You have watery poop (diarrhea).  Get help right away if:  · Your blood sugar is lower than  54 mg/dL (3 mmol/L).  · You get confused.  · You have trouble:  ? Thinking clearly.  ? Breathing.  Summary  · Diabetes (diabetes mellitus) is a long-term (chronic) disease. It occurs when the body does not properly use sugar (glucose) that is released from food after digestion.  · Take insulin and diabetes medicines as told.  · Check your blood sugar every day, as often as told.  · Keep all follow-up visits as told by your doctor. This is important.  This information is not intended to replace advice given to you by your health care provider. Make sure you discuss any questions you have with your health care provider.  Document Released: 03/22/2019 Document Revised: 02/07/2020 Document Reviewed: 03/22/2019  Elsevier Patient Education © 2020 [employer] Inc.

## 2021-04-26 NOTE — ASSESSMENT & PLAN NOTE
Results for RANGEGRACIE (MRN [id number]) as of 4/26/2021 16:03   Ref. Range 1/2/2021 08:23 1/6/2021 07:55 1/12/2021 17:06 4/3/2021 08:40   WBC Latest Ref Range: 4.8 - 10.8 K/uL 4.5 (L)   3.8 (L)   RBC Latest Ref Range: 4.20 - 5.40 M/uL 4.59   4.63   Hemoglobin Latest Ref Range: 12.0 - 16.0 g/dL 13.6   13.6   Hematocrit Latest Ref Range: 37.0 - 47.0 % 43.1   42.6   MCV Latest Ref Range: 81.4 - 97.8 fL 93.9   92.0   MCH Latest Ref Range: 27.0 - 33.0 pg 29.6   29.4   MCHC Latest Ref Range: 33.6 - 35.0 g/dL 31.6 (L)   31.9 (L)   RDW Latest Ref Range: 35.9 - 50.0 fL 49.0   46.9   Platelet Count Latest Ref Range: 164 - 446 K/uL 277   277   MPV Latest Ref Range: 9.0 - 12.9 fL 9.5   10.1   Neutrophils-Polys Latest Ref Range: 44.00 - 72.00 % 57.60   49.10   Neutrophils (Absolute) Latest Ref Range: 2.00 - 7.15 K/uL 2.56   1.89 (L)   Lymphocytes Latest Ref Range: 22.00 - 41.00 % 30.80   37.50   Patient denies any recurrent infections.

## 2021-04-26 NOTE — ASSESSMENT & PLAN NOTE
Results for RANGE, GRACIE (MRN [id number]) as of 4/26/2021 16:03   Ref. Range 6/13/2019 07:47 12/10/2019 09:21 6/2/2020 11:05 1/2/2021 08:23 1/6/2021 07:55 1/12/2021 17:06 4/3/2021 08:40   Glycohemoglobin Latest Ref Range: 4.0 - 5.6 % 5.9 (H) 5.7 (H) 5.5 5.9 (H)   6.8 (H)   Estim. Avg Glu Latest Units: mg/dL 123 117 111 123   148   This is a new diagnosis.  Patient has been eating a lot of sugar.  She would really like to avoid medications and would like to try dietary modifications first.

## 2021-04-26 NOTE — PROGRESS NOTES
Subjective:     Chief Complaint   Patient presents with   • Lab Results       Gracie Taveras is a 55 y.o. female here today for evaluation and management of:    Type 2 diabetes mellitus without complication, without long-term current use of insulin (HCC)  Results for GRACIE TAVERAS (MRN [id number]) as of 4/26/2021 16:03   Ref. Range 6/13/2019 07:47 12/10/2019 09:21 6/2/2020 11:05 1/2/2021 08:23 1/6/2021 07:55 1/12/2021 17:06 4/3/2021 08:40   Glycohemoglobin Latest Ref Range: 4.0 - 5.6 % 5.9 (H) 5.7 (H) 5.5 5.9 (H)   6.8 (H)   Estim. Avg Glu Latest Units: mg/dL 123 117 111 123   148   This is a new diagnosis.  Patient has been eating a lot of sugar.  She would really like to avoid medications and would like to try dietary modifications first.      Other neutropenia (HCC)  Results for GRACIE TAVERAS (MRN [id number]) as of 4/26/2021 16:03   Ref. Range 1/2/2021 08:23 1/6/2021 07:55 1/12/2021 17:06 4/3/2021 08:40   WBC Latest Ref Range: 4.8 - 10.8 K/uL 4.5 (L)   3.8 (L)   RBC Latest Ref Range: 4.20 - 5.40 M/uL 4.59   4.63   Hemoglobin Latest Ref Range: 12.0 - 16.0 g/dL 13.6   13.6   Hematocrit Latest Ref Range: 37.0 - 47.0 % 43.1   42.6   MCV Latest Ref Range: 81.4 - 97.8 fL 93.9   92.0   MCH Latest Ref Range: 27.0 - 33.0 pg 29.6   29.4   MCHC Latest Ref Range: 33.6 - 35.0 g/dL 31.6 (L)   31.9 (L)   RDW Latest Ref Range: 35.9 - 50.0 fL 49.0   46.9   Platelet Count Latest Ref Range: 164 - 446 K/uL 277   277   MPV Latest Ref Range: 9.0 - 12.9 fL 9.5   10.1   Neutrophils-Polys Latest Ref Range: 44.00 - 72.00 % 57.60   49.10   Neutrophils (Absolute) Latest Ref Range: 2.00 - 7.15 K/uL 2.56   1.89 (L)   Lymphocytes Latest Ref Range: 22.00 - 41.00 % 30.80   37.50   Patient denies any recurrent infections.       Allergies   Allergen Reactions   • Pcn [Penicillins] Shortness of Breath and Rash     Rash, SOB, tachycardia       Current medicines (including changes today)  Current Outpatient Medications   Medication Sig Dispense Refill   •  "levothyroxine (SYNTHROID) 75 MCG Tab Take 1 Tab by mouth Every morning on an empty stomach. 90 Tab 3     No current facility-administered medications for this visit.       She  has a past medical history of Thyroid disease.    Patient Active Problem List    Diagnosis Date Noted   • Other neutropenia (HCC) 01/06/2021   • Type 2 diabetes mellitus without complication, without long-term current use of insulin (HCC) 01/06/2021   • Vitamin D deficiency 01/06/2021   • Inverted nipple 03/14/2019   • Dyslipidemia 12/20/2018   • Nevus 06/28/2018   • Acquired hypothyroidism 12/08/2017       ROS   No fever or chills.  No nausea or vomiting.  No chest pain or palpitations.  No cough or SOB.  No pain with urination or hematuria.  No black or bloody stools.       Objective:     /60 (BP Location: Right arm, Patient Position: Sitting, BP Cuff Size: Adult)   Pulse 61   Temp 37.2 °C (99 °F) (Temporal)   Resp 16   Ht 1.575 m (5' 2\")   Wt 48.1 kg (106 lb)   SpO2 97%  Body mass index is 19.39 kg/m².   Physical Exam:  Well developed, well nourished.  Alert, oriented in no acute distress.  Eye contact is good, speech goal directed, affect calm  Eyes: conjunctiva non-injected, sclera non-icteric.  Neck Supple.  No adenopathy or masses in the neck or supraclavicular regions. No thyromegaly  Lungs: clear to auscultation bilaterally with good excursion. No wheezes or rhonchi  CV: regular rate and rhythm. No murmur      Assessment and Plan:   The following treatment plan was discussed    1. Type 2 diabetes mellitus without complication, without long-term current use of insulin ([facility])  This is a new problem to me.  Dietary counseling done.  Recommend Mediterranean eating plan.  Repeat labs in 3 months.  If she remains elevated at that time will consider medication.  Increase exercise  - URINALYSIS,CULTURE IF INDICATED; Future  - MICROALBUMIN CREAT RATIO URINE; Future  - POC GLUCOSE  - Comp Metabolic Panel; Future  - HEMOGLOBIN A1C; " Future    2. Other neutropenia (HCC)  Repeat CBC in 3 months  - CBC WITH DIFFERENTIAL; Future    3. Acquired hypothyroidism  Check thyroid labs at next visit.  Adjust levothyroxine dose as needed  - TSH; Future  - FREE THYROXINE; Future    4. Vitamin D deficiency  Check vitamin D level and adjust supplementation as needed  - VITAMIN D,25 HYDROXY; Future    5. Dyslipidemia  Check lipid profile.  Mediterranean eating plan recommended.  Patient would like to avoid statin at this time  - Lipid Profile; Future    Any change or worsening of signs or symptoms, patient encouraged to follow-up or report to the emergency room for further evaluation. Patient understands and agrees.    Followup: Return in about 3 months (around 7/20/2021) for DM-RN appt.

## 2021-07-12 ENCOUNTER — HOSPITAL ENCOUNTER (OUTPATIENT)
Dept: LAB | Facility: MEDICAL CENTER | Age: 56
End: 2021-07-12
Attending: FAMILY MEDICINE
Payer: COMMERCIAL

## 2021-07-12 DIAGNOSIS — E78.5 DYSLIPIDEMIA: ICD-10-CM

## 2021-07-12 DIAGNOSIS — E55.9 VITAMIN D DEFICIENCY: ICD-10-CM

## 2021-07-12 DIAGNOSIS — D70.8 OTHER NEUTROPENIA (HCC): ICD-10-CM

## 2021-07-12 DIAGNOSIS — E11.9 TYPE 2 DIABETES MELLITUS WITHOUT COMPLICATION, WITHOUT LONG-TERM CURRENT USE OF INSULIN (HCC): ICD-10-CM

## 2021-07-12 DIAGNOSIS — E03.9 ACQUIRED HYPOTHYROIDISM: ICD-10-CM

## 2021-07-12 LAB
25(OH)D3 SERPL-MCNC: 98 NG/ML (ref 30–100)
ALBUMIN SERPL BCP-MCNC: 4.1 G/DL (ref 3.2–4.9)
ALBUMIN/GLOB SERPL: 1.5 G/DL
ALP SERPL-CCNC: 76 U/L (ref 30–99)
ALT SERPL-CCNC: 25 U/L (ref 2–50)
ANION GAP SERPL CALC-SCNC: 9 MMOL/L (ref 7–16)
AST SERPL-CCNC: 17 U/L (ref 12–45)
BASOPHILS # BLD AUTO: 0.9 % (ref 0–1.8)
BASOPHILS # BLD: 0.04 K/UL (ref 0–0.12)
BILIRUB SERPL-MCNC: 0.2 MG/DL (ref 0.1–1.5)
BUN SERPL-MCNC: 26 MG/DL (ref 8–22)
CALCIUM SERPL-MCNC: 9.2 MG/DL (ref 8.5–10.5)
CHLORIDE SERPL-SCNC: 105 MMOL/L (ref 96–112)
CHOLEST SERPL-MCNC: 221 MG/DL (ref 100–199)
CO2 SERPL-SCNC: 24 MMOL/L (ref 20–33)
CREAT SERPL-MCNC: 0.63 MG/DL (ref 0.5–1.4)
EOSINOPHIL # BLD AUTO: 0.13 K/UL (ref 0–0.51)
EOSINOPHIL NFR BLD: 3.1 % (ref 0–6.9)
ERYTHROCYTE [DISTWIDTH] IN BLOOD BY AUTOMATED COUNT: 46.8 FL (ref 35.9–50)
EST. AVERAGE GLUCOSE BLD GHB EST-MCNC: 126 MG/DL
GLOBULIN SER CALC-MCNC: 2.8 G/DL (ref 1.9–3.5)
GLUCOSE SERPL-MCNC: 106 MG/DL (ref 65–99)
HBA1C MFR BLD: 6 % (ref 4–5.6)
HCT VFR BLD AUTO: 40.7 % (ref 37–47)
HDLC SERPL-MCNC: 84 MG/DL
HGB BLD-MCNC: 13.3 G/DL (ref 12–16)
IMM GRANULOCYTES # BLD AUTO: 0.01 K/UL (ref 0–0.11)
IMM GRANULOCYTES NFR BLD AUTO: 0.2 % (ref 0–0.9)
LDLC SERPL CALC-MCNC: 131 MG/DL
LYMPHOCYTES # BLD AUTO: 1.26 K/UL (ref 1–4.8)
LYMPHOCYTES NFR BLD: 29.6 % (ref 22–41)
MCH RBC QN AUTO: 29.6 PG (ref 27–33)
MCHC RBC AUTO-ENTMCNC: 32.7 G/DL (ref 33.6–35)
MCV RBC AUTO: 90.6 FL (ref 81.4–97.8)
MONOCYTES # BLD AUTO: 0.26 K/UL (ref 0–0.85)
MONOCYTES NFR BLD AUTO: 6.1 % (ref 0–13.4)
NEUTROPHILS # BLD AUTO: 2.56 K/UL (ref 2–7.15)
NEUTROPHILS NFR BLD: 60.1 % (ref 44–72)
NRBC # BLD AUTO: 0 K/UL
NRBC BLD-RTO: 0 /100 WBC
PLATELET # BLD AUTO: 262 K/UL (ref 164–446)
PMV BLD AUTO: 9.6 FL (ref 9–12.9)
POTASSIUM SERPL-SCNC: 4.1 MMOL/L (ref 3.6–5.5)
PROT SERPL-MCNC: 6.9 G/DL (ref 6–8.2)
RBC # BLD AUTO: 4.49 M/UL (ref 4.2–5.4)
SODIUM SERPL-SCNC: 138 MMOL/L (ref 135–145)
T4 FREE SERPL-MCNC: 1.29 NG/DL (ref 0.93–1.7)
TRIGL SERPL-MCNC: 31 MG/DL (ref 0–149)
TSH SERPL DL<=0.005 MIU/L-ACNC: 2.88 UIU/ML (ref 0.38–5.33)
WBC # BLD AUTO: 4.3 K/UL (ref 4.8–10.8)

## 2021-07-12 PROCEDURE — 82306 VITAMIN D 25 HYDROXY: CPT

## 2021-07-12 PROCEDURE — 80053 COMPREHEN METABOLIC PANEL: CPT

## 2021-07-12 PROCEDURE — 84439 ASSAY OF FREE THYROXINE: CPT

## 2021-07-12 PROCEDURE — 83036 HEMOGLOBIN GLYCOSYLATED A1C: CPT

## 2021-07-12 PROCEDURE — 80061 LIPID PANEL: CPT

## 2021-07-12 PROCEDURE — 84443 ASSAY THYROID STIM HORMONE: CPT

## 2021-07-12 PROCEDURE — 36415 COLL VENOUS BLD VENIPUNCTURE: CPT

## 2021-07-12 PROCEDURE — 85025 COMPLETE CBC W/AUTO DIFF WBC: CPT

## 2021-07-19 ENCOUNTER — OFFICE VISIT (OUTPATIENT)
Dept: MEDICAL GROUP | Facility: LAB | Age: 56
End: 2021-07-19
Payer: COMMERCIAL

## 2021-07-19 VITALS
HEART RATE: 76 BPM | WEIGHT: 107 LBS | SYSTOLIC BLOOD PRESSURE: 100 MMHG | DIASTOLIC BLOOD PRESSURE: 60 MMHG | RESPIRATION RATE: 16 BRPM | TEMPERATURE: 99.5 F | HEIGHT: 62 IN | OXYGEN SATURATION: 96 % | BODY MASS INDEX: 19.69 KG/M2

## 2021-07-19 DIAGNOSIS — E03.9 ACQUIRED HYPOTHYROIDISM: ICD-10-CM

## 2021-07-19 DIAGNOSIS — E78.5 DYSLIPIDEMIA: ICD-10-CM

## 2021-07-19 DIAGNOSIS — E11.9 TYPE 2 DIABETES MELLITUS WITHOUT COMPLICATION, WITHOUT LONG-TERM CURRENT USE OF INSULIN (HCC): ICD-10-CM

## 2021-07-19 PROCEDURE — 99214 OFFICE O/P EST MOD 30 MIN: CPT | Performed by: FAMILY MEDICINE

## 2021-07-19 ASSESSMENT — FIBROSIS 4 INDEX: FIB4 SCORE: 0.69

## 2021-07-19 NOTE — PATIENT INSTRUCTIONS
Mediterranean Diet  A Mediterranean diet refers to food and lifestyle choices that are based on the traditions of countries located on the [place]. This way of eating has been shown to help prevent certain conditions and improve outcomes for people who have chronic diseases, like kidney disease and heart disease.  What are tips for following this plan?  Lifestyle  · Cook and eat meals together with your family, when possible.  · Drink enough fluid to keep your urine clear or pale yellow.  · Be physically active every day. This includes:  ? Aerobic exercise like running or swimming.  ? Leisure activities like gardening, walking, or housework.  · Get 7-8 hours of sleep each night.  · If recommended by your health care provider, drink red wine in moderation. This means 1 glass a day for nonpregnant women and 2 glasses a day for men. A glass of wine equals 5 oz (150 mL).  Reading food labels    · Check the serving size of packaged foods. For foods such as rice and pasta, the serving size refers to the amount of cooked product, not dry.  · Check the total fat in packaged foods. Avoid foods that have saturated fat or trans fats.  · Check the ingredients list for added sugars, such as corn syrup.  Shopping  · At the grocery store, buy most of your food from the areas near the walls of the store. This includes:  ? Fresh fruits and vegetables (produce).  ? Grains, beans, nuts, and seeds. Some of these may be available in unpackaged forms or large amounts (in bulk).  ? Fresh seafood.  ? Poultry and eggs.  ? Low-fat dairy products.  · Buy whole ingredients instead of prepackaged foods.  · Buy fresh fruits and vegetables in-season from local farmers markets.  · Buy frozen fruits and vegetables in resealable bags.  · If you do not have access to quality fresh seafood, buy precooked frozen shrimp or canned fish, such as tuna, salmon, or sardines.  · Buy small amounts of raw or cooked vegetables, salads, or olives from  the deli or salad bar at your store.  · Stock your pantry so you always have certain foods on hand, such as olive oil, canned tuna, canned tomatoes, rice, pasta, and beans.  Cooking  · Cook foods with extra-virgin olive oil instead of using butter or other vegetable oils.  · Have meat as a side dish, and have vegetables or grains as your main dish. This means having meat in small portions or adding small amounts of meat to foods like pasta or stew.  · Use beans or vegetables instead of meat in common dishes like chili or lasagna.  · [place] with different cooking methods. Try roasting or broiling vegetables instead of steaming or sautéeing them.  · Add frozen vegetables to soups, stews, pasta, or rice.  · Add nuts or seeds for added healthy fat at each meal. You can add these to yogurt, salads, or vegetable dishes.  · Marinate fish or vegetables using olive oil, lemon juice, garlic, and fresh herbs.  Meal planning    · Plan to eat 1 vegetarian meal one day each week. Try to work up to 2 vegetarian meals, if possible.  · Eat seafood 2 or more times a week.  · Have healthy snacks readily available, such as:  ? Vegetable sticks with hummus.  ? Greek yogurt.  ? Fruit and nut trail mix.  · Eat balanced meals throughout the week. This includes:  ? Fruit: 2-3 servings a day  ? Vegetables: 4-5 servings a day  ? Low-fat dairy: 2 servings a day  ? Fish, poultry, or lean meat: 1 serving a day  ? Beans and legumes: 2 or more servings a week  ? Nuts and seeds: 1-2 servings a day  ? Whole grains: 6-8 servings a day  ? Extra-virgin olive oil: 3-4 servings a day  · Limit red meat and sweets to only a few servings a month  What are my food choices?  · Mediterranean diet  ? Recommended  § Grains: Whole-grain pasta. Brown rice. Bulgar wheat. Polenta. Couscous. Whole-wheat bread. Oatmeal. Quinoa.  § Vegetables: Artichokes. Beets. Broccoli. Cabbage. Carrots. Eggplant. Green beans. Chard. Kale. Spinach. Onions. Leeks. Peas. Squash.  Tomatoes. Peppers. Radishes.  § Fruits: Apples. Apricots. Avocado. Berries. Bananas. Cherries. Dates. Figs. Grapes. [place]. Melon. Oranges. Peaches. Plums. Pomegranate.  § Meats and other protein foods: Beans. Almonds. Sunflower seeds. Pine nuts. Peanuts. Cod. [place]. Scallops. Shrimp. Tuna. Tilapia. Clams. Oysters. Eggs.  § Dairy: Low-fat milk. Cheese. Greek yogurt.  § Beverages: Water. Red wine. Herbal tea.  § Fats and oils: Extra virgin olive oil. Avocado oil. Grape seed oil.  § Sweets and desserts: Greek yogurt with honey. Baked apples. Poached pears. Trail mix.  § Seasoning and other foods: Basil. Cilantro. Coriander. Cumin. Mint. Parsley. Alex. Rosemary. Tarragon. Garlic. Oregano. Thyme. Pepper. Balsalmic vinegar. Tahini. Hummus. Tomato sauce. Olives. Mushrooms.  ? Limit these  § Grains: Prepackaged pasta or rice dishes. Prepackaged cereal with added sugar.  § Vegetables: Deep fried potatoes (french fries).  § Fruits: Fruit canned in syrup.  § Meats and other protein foods: Beef. Pork. Lamb. Poultry with skin. Hot dogs. [place].  § Dairy: Ice cream. Sour cream. Whole milk.  § Beverages: Juice. Sugar-sweetened soft drinks. Beer. Liquor and spirits.  § Fats and oils: Butter. Canola oil. Vegetable oil. Beef fat (tallow). Lard.  § Sweets and desserts: Cookies. Cakes. Pies. Candy.  § Seasoning and other foods: Mayonnaise. Premade sauces and marinades.  The items listed may not be a complete list. Talk with your dietitian about what dietary choices are right for you.  Summary  · The Mediterranean diet includes both food and lifestyle choices.  · Eat a variety of fresh fruits and vegetables, beans, nuts, seeds, and whole grains.  · Limit the amount of red meat and sweets that you eat.  · Talk with your health care provider about whether it is safe for you to drink red wine in moderation. This means 1 glass a day for nonpregnant women and 2 glasses a day for men. A glass of wine equals 5 oz (150 mL).  This information  is not intended to replace advice given to you by your health care provider. Make sure you discuss any questions you have with your health care provider.  Document Released: 08/10/2017 Document Revised: 08/17/2017 Document Reviewed: 08/10/2017  Elsevier Patient Education © 2020 [employer] Inc.

## 2021-07-21 NOTE — ASSESSMENT & PLAN NOTE
Significantly improved.  Patient made modifications to her diet and is back to exercising regularly. Denies side effects or hypoglycemic events.  She is not monitoring blood sugar regularly at home.  Reports diet is great  She is exercising regularly.  Last eye exam: She will schedule an appointment  Denies polyuria, polydipsia, blurred vision, or neuropathies.  Results for RANGE, GRACIE (MRN [id number]) as of 7/21/2021 11:30   Ref. Range 4/3/2021 08:40 4/20/2021 11:39 4/20/2021 11:49 7/12/2021 08:48   Glycohemoglobin Latest Ref Range: 4.0 - 5.6 % 6.8 (H)   6.0 (H)   Estim. Avg Glu Latest Units: mg/dL 148   126

## 2021-07-21 NOTE — ASSESSMENT & PLAN NOTE
Stable. Currently taking levothyroxine 75 mcg daily as prescribed.  Denies palpitations, skin changes, temperature intolerance, or changes in bowel habits  Results for RANGE, GRACIE (MRN [id number]) as of 7/21/2021 11:30   Ref. Range 7/12/2021 08:48   TSH Latest Ref Range: 0.380 - 5.330 uIU/mL 2.880   Free T-4 Latest Ref Range: 0.93 - 1.70 ng/dL 1.29

## 2021-07-21 NOTE — ASSESSMENT & PLAN NOTE
Patient would really like to avoid medications if at all possible. The 10-year ASCVD risk score (Dio HARPER Jr., et al., 2013) is: 1.4%  We have discussed that with diabetes we do recommend statins for cardiovascular protection.  Patient does not want to proceed with that at this time.  This seems acceptable given her low 10-year ASCVD score.

## 2021-07-21 NOTE — PROGRESS NOTES
Subjective:     Chief Complaint   Patient presents with   • Lab Results       Gracie Taveras is a 55 y.o. female here today for evaluation and management of:    Type 2 diabetes mellitus without complication, without long-term current use of insulin (HCC)  Significantly improved.  Patient made modifications to her diet and is back to exercising regularly. Denies side effects or hypoglycemic events.  She is not monitoring blood sugar regularly at home.  Reports diet is great  She is exercising regularly.  Last eye exam: She will schedule an appointment  Denies polyuria, polydipsia, blurred vision, or neuropathies.  Results for GRACIE TAVERAS (MRN [id number]) as of 7/21/2021 11:30   Ref. Range 4/3/2021 08:40 4/20/2021 11:39 4/20/2021 11:49 7/12/2021 08:48   Glycohemoglobin Latest Ref Range: 4.0 - 5.6 % 6.8 (H)   6.0 (H)   Estim. Avg Glu Latest Units: mg/dL 148   126       Dyslipidemia  Patient would really like to avoid medications if at all possible. The 10-year ASCVD risk score (Windsor DC Jr., et al., 2013) is: 1.4%  We have discussed that with diabetes we do recommend statins for cardiovascular protection.  Patient does not want to proceed with that at this time.  This seems acceptable given her low 10-year ASCVD score.    Acquired hypothyroidism  Stable. Currently taking levothyroxine 75 mcg daily as prescribed.  Denies palpitations, skin changes, temperature intolerance, or changes in bowel habits  Results for GRACIE TAVERAS (MRN [id number]) as of 7/21/2021 11:30   Ref. Range 7/12/2021 08:48   TSH Latest Ref Range: 0.380 - 5.330 uIU/mL 2.880   Free T-4 Latest Ref Range: 0.93 - 1.70 ng/dL 1.29            Allergies   Allergen Reactions   • Pcn [Penicillins] Shortness of Breath and Rash     Rash, SOB, tachycardia       Current medicines (including changes today)  Current Outpatient Medications   Medication Sig Dispense Refill   • levothyroxine (SYNTHROID) 75 MCG Tab Take 1 Tab by mouth Every morning on an empty stomach. 90 Tab 3  "    No current facility-administered medications for this visit.       She  has a past medical history of Thyroid disease.    Patient Active Problem List    Diagnosis Date Noted   • Other neutropenia (HCC) 01/06/2021   • Type 2 diabetes mellitus without complication, without long-term current use of insulin (HCC) 01/06/2021   • Vitamin D deficiency 01/06/2021   • Inverted nipple 03/14/2019   • Dyslipidemia 12/20/2018   • Nevus 06/28/2018   • Acquired hypothyroidism 12/08/2017       ROS   No fever or chills.  No nausea or vomiting.  No chest pain or palpitations.  No cough or SOB.  No pain with urination or hematuria.  No black or bloody stools.       Objective:     /60 (BP Location: Right arm, Patient Position: Sitting, BP Cuff Size: Adult)   Pulse 76   Temp 37.5 °C (99.5 °F) (Temporal)   Resp 16   Ht 1.575 m (5' 2\")   Wt 48.5 kg (107 lb)   SpO2 96%  Body mass index is 19.57 kg/m².   Physical Exam:  Well developed, well nourished.  Alert, oriented in no acute distress.  Eye contact is good, speech goal directed, affect calm  Eyes: conjunctiva non-injected, sclera non-icteric.  Neck Supple.  No adenopathy or masses in the neck or supraclavicular regions. No thyromegaly  Lungs: clear to auscultation bilaterally with good excursion. No wheezes or rhonchi  CV: regular rate and rhythm. No murmur  Monofilament testing with a 10 gram force: sensation intact: intact bilaterally  Visual Inspection: Feet with maceration, ulcers, fissures.  Pedal pulses: intact bilaterally      Assessment and Plan:   The following treatment plan was discussed    1. Dyslipidemia  Patient would really like to avoid medications if at all possible. The 10-year ASCVD risk score (Dio DC Jr., et al., 2013) is: 1.4%  We have discussed that with diabetes we do recommend statins for cardiovascular protection.  Patient does not want to proceed with that at this time.  This seems acceptable given her low 10-year ASCVD score.  Check lipid " profile to see any trends.  Continue Mediterranean eating plan.  - Lipid Profile; Future    2. Type 2 diabetes mellitus without complication, without long-term current use of insulin (HCC)  Significantly improved.  Patient made modifications to her diet and is back to exercising regularly. Denies side effects or hypoglycemic events.  She is not monitoring blood sugar regularly at home.  Reports diet is great  She is exercising regularly.  Last eye exam: She will schedule an appointment  Denies polyuria, polydipsia, blurred vision, or neuropathies.  Results for GRACIE MELCHOR (MRN [id number]) as of 7/21/2021 11:30   Ref. Range 4/3/2021 08:40 4/20/2021 11:39 4/20/2021 11:49 7/12/2021 08:48   Glycohemoglobin Latest Ref Range: 4.0 - 5.6 % 6.8 (H)   6.0 (H)   Estim. Avg Glu Latest Units: mg/dL 148   126     - Diabetic Monofilament Lower Extremity Exam  - Hemoglobin A1c; Future  Continue increased exercise.  Recheck in 6 months    3. Acquired hypothyroidism  Stable. Currently taking levothyroxine 75 mcg daily as prescribed.  Denies palpitations, skin changes, temperature intolerance, or changes in bowel habits  Results for GRACIE MELCHOR (MRN [id number]) as of 7/21/2021 11:30   Ref. Range 7/12/2021 08:48   TSH Latest Ref Range: 0.380 - 5.330 uIU/mL 2.880   Free T-4 Latest Ref Range: 0.93 - 1.70 ng/dL 1.29       Any change or worsening of signs or symptoms, patient encouraged to follow-up or report to the emergency room for further evaluation. Patient understands and agrees.    Followup: Return in about 3 months (around 10/19/2021).

## 2021-07-25 ENCOUNTER — HOSPITAL ENCOUNTER (OUTPATIENT)
Facility: MEDICAL CENTER | Age: 56
End: 2021-07-26
Attending: EMERGENCY MEDICINE | Admitting: HOSPITALIST
Payer: COMMERCIAL

## 2021-07-25 ENCOUNTER — APPOINTMENT (OUTPATIENT)
Dept: RADIOLOGY | Facility: MEDICAL CENTER | Age: 56
End: 2021-07-25
Attending: EMERGENCY MEDICINE
Payer: COMMERCIAL

## 2021-07-25 ENCOUNTER — ANESTHESIA (OUTPATIENT)
Dept: SURGERY | Facility: MEDICAL CENTER | Age: 56
End: 2021-07-25
Payer: COMMERCIAL

## 2021-07-25 ENCOUNTER — ANESTHESIA EVENT (OUTPATIENT)
Dept: SURGERY | Facility: MEDICAL CENTER | Age: 56
End: 2021-07-25
Payer: COMMERCIAL

## 2021-07-25 ENCOUNTER — APPOINTMENT (OUTPATIENT)
Dept: RADIOLOGY | Facility: MEDICAL CENTER | Age: 56
End: 2021-07-25
Attending: ORTHOPAEDIC SURGERY
Payer: COMMERCIAL

## 2021-07-25 DIAGNOSIS — S82.422A CLOSED DISPLACED TRANSVERSE FRACTURE OF SHAFT OF LEFT FIBULA, INITIAL ENCOUNTER: ICD-10-CM

## 2021-07-25 DIAGNOSIS — S82.222A CLOSED DISPLACED TRANSVERSE FRACTURE OF SHAFT OF LEFT TIBIA, INITIAL ENCOUNTER: ICD-10-CM

## 2021-07-25 DIAGNOSIS — S82.92XA CLOSED FRACTURE OF LEFT LOWER EXTREMITY, INITIAL ENCOUNTER: ICD-10-CM

## 2021-07-25 LAB
ALBUMIN SERPL BCP-MCNC: 4.2 G/DL (ref 3.2–4.9)
ALBUMIN/GLOB SERPL: 1.4 G/DL
ALP SERPL-CCNC: 76 U/L (ref 30–99)
ALT SERPL-CCNC: 26 U/L (ref 2–50)
ANION GAP SERPL CALC-SCNC: 14 MMOL/L (ref 7–16)
AST SERPL-CCNC: 23 U/L (ref 12–45)
BASOPHILS # BLD AUTO: 0.5 % (ref 0–1.8)
BASOPHILS # BLD: 0.03 K/UL (ref 0–0.12)
BILIRUB SERPL-MCNC: 0.3 MG/DL (ref 0.1–1.5)
BUN SERPL-MCNC: 28 MG/DL (ref 8–22)
CALCIUM SERPL-MCNC: 8.8 MG/DL (ref 8.4–10.2)
CHLORIDE SERPL-SCNC: 104 MMOL/L (ref 96–112)
CO2 SERPL-SCNC: 23 MMOL/L (ref 20–33)
CREAT SERPL-MCNC: 0.62 MG/DL (ref 0.5–1.4)
EOSINOPHIL # BLD AUTO: 0.27 K/UL (ref 0–0.51)
EOSINOPHIL NFR BLD: 4.2 % (ref 0–6.9)
ERYTHROCYTE [DISTWIDTH] IN BLOOD BY AUTOMATED COUNT: 48 FL (ref 35.9–50)
GLOBULIN SER CALC-MCNC: 3 G/DL (ref 1.9–3.5)
GLUCOSE BLD-MCNC: 103 MG/DL (ref 65–99)
GLUCOSE SERPL-MCNC: 134 MG/DL (ref 65–99)
HCT VFR BLD AUTO: 41.4 % (ref 37–47)
HGB BLD-MCNC: 13.2 G/DL (ref 12–16)
IMM GRANULOCYTES # BLD AUTO: 0.02 K/UL (ref 0–0.11)
IMM GRANULOCYTES NFR BLD AUTO: 0.3 % (ref 0–0.9)
LYMPHOCYTES # BLD AUTO: 2.64 K/UL (ref 1–4.8)
LYMPHOCYTES NFR BLD: 41.5 % (ref 22–41)
MCH RBC QN AUTO: 29.7 PG (ref 27–33)
MCHC RBC AUTO-ENTMCNC: 31.9 G/DL (ref 33.6–35)
MCV RBC AUTO: 93.2 FL (ref 81.4–97.8)
MONOCYTES # BLD AUTO: 0.49 K/UL (ref 0–0.85)
MONOCYTES NFR BLD AUTO: 7.7 % (ref 0–13.4)
NEUTROPHILS # BLD AUTO: 2.91 K/UL (ref 2–7.15)
NEUTROPHILS NFR BLD: 45.8 % (ref 44–72)
NRBC # BLD AUTO: 0 K/UL
NRBC BLD-RTO: 0 /100 WBC
PLATELET # BLD AUTO: 265 K/UL (ref 164–446)
PMV BLD AUTO: 10 FL (ref 9–12.9)
POTASSIUM SERPL-SCNC: 3.8 MMOL/L (ref 3.6–5.5)
PROT SERPL-MCNC: 7.2 G/DL (ref 6–8.2)
RBC # BLD AUTO: 4.44 M/UL (ref 4.2–5.4)
SARS-COV+SARS-COV-2 AG RESP QL IA.RAPID: NOTDETECTED
SODIUM SERPL-SCNC: 141 MMOL/L (ref 135–145)
SPECIMEN SOURCE: NORMAL
WBC # BLD AUTO: 6.4 K/UL (ref 4.8–10.8)

## 2021-07-25 PROCEDURE — 700111 HCHG RX REV CODE 636 W/ 250 OVERRIDE (IP): Performed by: HOSPITALIST

## 2021-07-25 PROCEDURE — C1713 ANCHOR/SCREW BN/BN,TIS/BN: HCPCS | Performed by: ORTHOPAEDIC SURGERY

## 2021-07-25 PROCEDURE — 700102 HCHG RX REV CODE 250 W/ 637 OVERRIDE(OP): Performed by: ORTHOPAEDIC SURGERY

## 2021-07-25 PROCEDURE — 73590 X-RAY EXAM OF LOWER LEG: CPT | Mod: LT

## 2021-07-25 PROCEDURE — 700101 HCHG RX REV CODE 250: Performed by: ORTHOPAEDIC SURGERY

## 2021-07-25 PROCEDURE — 160035 HCHG PACU - 1ST 60 MINS PHASE I: Performed by: ORTHOPAEDIC SURGERY

## 2021-07-25 PROCEDURE — 700105 HCHG RX REV CODE 258: Performed by: ORTHOPAEDIC SURGERY

## 2021-07-25 PROCEDURE — 160029 HCHG SURGERY MINUTES - 1ST 30 MINS LEVEL 4: Performed by: ORTHOPAEDIC SURGERY

## 2021-07-25 PROCEDURE — 99219 PR INITIAL OBSERVATION CARE,LEVL II: CPT | Performed by: HOSPITALIST

## 2021-07-25 PROCEDURE — 500881 HCHG PACK, EXTREMITY: Performed by: ORTHOPAEDIC SURGERY

## 2021-07-25 PROCEDURE — 160002 HCHG RECOVERY MINUTES (STAT): Performed by: ORTHOPAEDIC SURGERY

## 2021-07-25 PROCEDURE — 29505 APPLICATION LONG LEG SPLINT: CPT

## 2021-07-25 PROCEDURE — 700101 HCHG RX REV CODE 250: Performed by: ANESTHESIOLOGY

## 2021-07-25 PROCEDURE — 502000 HCHG MISC OR IMPLANTS RC 0278: Performed by: ORTHOPAEDIC SURGERY

## 2021-07-25 PROCEDURE — 80053 COMPREHEN METABOLIC PANEL: CPT

## 2021-07-25 PROCEDURE — G0378 HOSPITAL OBSERVATION PER HR: HCPCS

## 2021-07-25 PROCEDURE — 160048 HCHG OR STATISTICAL LEVEL 1-5: Performed by: ORTHOPAEDIC SURGERY

## 2021-07-25 PROCEDURE — 85025 COMPLETE CBC W/AUTO DIFF WBC: CPT

## 2021-07-25 PROCEDURE — 302874 HCHG BANDAGE ACE 2 OR 3""

## 2021-07-25 PROCEDURE — C1769 GUIDE WIRE: HCPCS | Performed by: ORTHOPAEDIC SURGERY

## 2021-07-25 PROCEDURE — 99285 EMERGENCY DEPT VISIT HI MDM: CPT

## 2021-07-25 PROCEDURE — 160009 HCHG ANES TIME/MIN: Performed by: ORTHOPAEDIC SURGERY

## 2021-07-25 PROCEDURE — 700102 HCHG RX REV CODE 250 W/ 637 OVERRIDE(OP): Performed by: HOSPITALIST

## 2021-07-25 PROCEDURE — 94760 N-INVAS EAR/PLS OXIMETRY 1: CPT

## 2021-07-25 PROCEDURE — 700111 HCHG RX REV CODE 636 W/ 250 OVERRIDE (IP): Performed by: ORTHOPAEDIC SURGERY

## 2021-07-25 PROCEDURE — 110371 HCHG SHELL REV 272: Performed by: ORTHOPAEDIC SURGERY

## 2021-07-25 PROCEDURE — 700111 HCHG RX REV CODE 636 W/ 250 OVERRIDE (IP): Performed by: ANESTHESIOLOGY

## 2021-07-25 PROCEDURE — 501838 HCHG SUTURE GENERAL: Performed by: ORTHOPAEDIC SURGERY

## 2021-07-25 PROCEDURE — 160041 HCHG SURGERY MINUTES - EA ADDL 1 MIN LEVEL 4: Performed by: ORTHOPAEDIC SURGERY

## 2021-07-25 PROCEDURE — 302875 HCHG BANDAGE ACE 4 OR 6""

## 2021-07-25 PROCEDURE — A9270 NON-COVERED ITEM OR SERVICE: HCPCS | Performed by: ORTHOPAEDIC SURGERY

## 2021-07-25 PROCEDURE — 82962 GLUCOSE BLOOD TEST: CPT | Mod: 91

## 2021-07-25 PROCEDURE — 87426 SARSCOV CORONAVIRUS AG IA: CPT

## 2021-07-25 DEVICE — IMPLANTABLE DEVICE: Type: IMPLANTABLE DEVICE | Site: TIBIA | Status: FUNCTIONAL

## 2021-07-25 RX ORDER — DEXTROSE MONOHYDRATE 25 G/50ML
50 INJECTION, SOLUTION INTRAVENOUS
Status: DISCONTINUED | OUTPATIENT
Start: 2021-07-25 | End: 2021-07-26 | Stop reason: HOSPADM

## 2021-07-25 RX ORDER — ONDANSETRON 2 MG/ML
4 INJECTION INTRAMUSCULAR; INTRAVENOUS ONCE
Status: ACTIVE | OUTPATIENT
Start: 2021-07-25 | End: 2021-07-26

## 2021-07-25 RX ORDER — PROCHLORPERAZINE EDISYLATE 5 MG/ML
5-10 INJECTION INTRAMUSCULAR; INTRAVENOUS EVERY 4 HOURS PRN
Status: DISCONTINUED | OUTPATIENT
Start: 2021-07-25 | End: 2021-07-26 | Stop reason: HOSPADM

## 2021-07-25 RX ORDER — SODIUM CHLORIDE, SODIUM LACTATE, POTASSIUM CHLORIDE, CALCIUM CHLORIDE 600; 310; 30; 20 MG/100ML; MG/100ML; MG/100ML; MG/100ML
INJECTION, SOLUTION INTRAVENOUS CONTINUOUS
Status: ACTIVE | OUTPATIENT
Start: 2021-07-25 | End: 2021-07-25

## 2021-07-25 RX ORDER — OXYCODONE HYDROCHLORIDE 5 MG/1
2.5 TABLET ORAL
Status: DISCONTINUED | OUTPATIENT
Start: 2021-07-25 | End: 2021-07-26 | Stop reason: HOSPADM

## 2021-07-25 RX ORDER — OXYCODONE HCL 5 MG/5 ML
10 SOLUTION, ORAL ORAL
Status: COMPLETED | OUTPATIENT
Start: 2021-07-25 | End: 2021-07-26

## 2021-07-25 RX ORDER — KETOROLAC TROMETHAMINE 30 MG/ML
INJECTION, SOLUTION INTRAMUSCULAR; INTRAVENOUS PRN
Status: DISCONTINUED | OUTPATIENT
Start: 2021-07-25 | End: 2021-07-25 | Stop reason: SURG

## 2021-07-25 RX ORDER — ENALAPRILAT 1.25 MG/ML
1.25 INJECTION INTRAVENOUS EVERY 6 HOURS PRN
Status: DISCONTINUED | OUTPATIENT
Start: 2021-07-25 | End: 2021-07-26 | Stop reason: HOSPADM

## 2021-07-25 RX ORDER — HEPARIN SODIUM 5000 [USP'U]/ML
5000 INJECTION, SOLUTION INTRAVENOUS; SUBCUTANEOUS EVERY 8 HOURS
Status: DISCONTINUED | OUTPATIENT
Start: 2021-07-25 | End: 2021-07-26 | Stop reason: HOSPADM

## 2021-07-25 RX ORDER — BUPIVACAINE HYDROCHLORIDE 5 MG/ML
INJECTION, SOLUTION EPIDURAL; INTRACAUDAL
Status: DISCONTINUED | OUTPATIENT
Start: 2021-07-25 | End: 2021-07-25 | Stop reason: HOSPADM

## 2021-07-25 RX ORDER — ONDANSETRON 2 MG/ML
4 INJECTION INTRAMUSCULAR; INTRAVENOUS EVERY 4 HOURS PRN
Status: DISCONTINUED | OUTPATIENT
Start: 2021-07-25 | End: 2021-07-26 | Stop reason: HOSPADM

## 2021-07-25 RX ORDER — MORPHINE SULFATE 4 MG/ML
4 INJECTION, SOLUTION INTRAMUSCULAR; INTRAVENOUS ONCE
Status: ACTIVE | OUTPATIENT
Start: 2021-07-25 | End: 2021-07-26

## 2021-07-25 RX ORDER — OXYCODONE HCL 5 MG/5 ML
5 SOLUTION, ORAL ORAL
Status: COMPLETED | OUTPATIENT
Start: 2021-07-25 | End: 2021-07-26

## 2021-07-25 RX ORDER — ONDANSETRON 2 MG/ML
INJECTION INTRAMUSCULAR; INTRAVENOUS PRN
Status: DISCONTINUED | OUTPATIENT
Start: 2021-07-25 | End: 2021-07-25 | Stop reason: SURG

## 2021-07-25 RX ORDER — MIDAZOLAM HYDROCHLORIDE 1 MG/ML
INJECTION INTRAMUSCULAR; INTRAVENOUS PRN
Status: DISCONTINUED | OUTPATIENT
Start: 2021-07-25 | End: 2021-07-25 | Stop reason: SURG

## 2021-07-25 RX ORDER — CLONIDINE HYDROCHLORIDE 0.1 MG/1
0.1 TABLET ORAL EVERY 6 HOURS PRN
Status: DISCONTINUED | OUTPATIENT
Start: 2021-07-25 | End: 2021-07-26 | Stop reason: HOSPADM

## 2021-07-25 RX ORDER — PROMETHAZINE HYDROCHLORIDE 25 MG/1
12.5-25 SUPPOSITORY RECTAL EVERY 4 HOURS PRN
Status: DISCONTINUED | OUTPATIENT
Start: 2021-07-25 | End: 2021-07-26 | Stop reason: HOSPADM

## 2021-07-25 RX ORDER — CEFAZOLIN SODIUM 1 G/3ML
INJECTION, POWDER, FOR SOLUTION INTRAMUSCULAR; INTRAVENOUS PRN
Status: DISCONTINUED | OUTPATIENT
Start: 2021-07-25 | End: 2021-07-25 | Stop reason: SURG

## 2021-07-25 RX ORDER — SODIUM CHLORIDE, SODIUM LACTATE, POTASSIUM CHLORIDE, CALCIUM CHLORIDE 600; 310; 30; 20 MG/100ML; MG/100ML; MG/100ML; MG/100ML
INJECTION, SOLUTION INTRAVENOUS CONTINUOUS
Status: DISCONTINUED | OUTPATIENT
Start: 2021-07-25 | End: 2021-07-26 | Stop reason: HOSPADM

## 2021-07-25 RX ORDER — BISACODYL 10 MG
10 SUPPOSITORY, RECTAL RECTAL
Status: DISCONTINUED | OUTPATIENT
Start: 2021-07-25 | End: 2021-07-26 | Stop reason: HOSPADM

## 2021-07-25 RX ORDER — ACETAMINOPHEN 325 MG/1
650 TABLET ORAL EVERY 6 HOURS PRN
Status: DISCONTINUED | OUTPATIENT
Start: 2021-07-25 | End: 2021-07-26 | Stop reason: HOSPADM

## 2021-07-25 RX ORDER — AMOXICILLIN 250 MG
2 CAPSULE ORAL 2 TIMES DAILY
Status: DISCONTINUED | OUTPATIENT
Start: 2021-07-25 | End: 2021-07-26 | Stop reason: HOSPADM

## 2021-07-25 RX ORDER — HYDROMORPHONE HYDROCHLORIDE 1 MG/ML
0.25 INJECTION, SOLUTION INTRAMUSCULAR; INTRAVENOUS; SUBCUTANEOUS
Status: DISCONTINUED | OUTPATIENT
Start: 2021-07-25 | End: 2021-07-26 | Stop reason: HOSPADM

## 2021-07-25 RX ORDER — ROCURONIUM BROMIDE 10 MG/ML
INJECTION, SOLUTION INTRAVENOUS PRN
Status: DISCONTINUED | OUTPATIENT
Start: 2021-07-25 | End: 2021-07-25 | Stop reason: HOSPADM

## 2021-07-25 RX ORDER — PROMETHAZINE HYDROCHLORIDE 25 MG/1
12.5-25 TABLET ORAL EVERY 4 HOURS PRN
Status: DISCONTINUED | OUTPATIENT
Start: 2021-07-25 | End: 2021-07-26 | Stop reason: HOSPADM

## 2021-07-25 RX ORDER — HALOPERIDOL 5 MG/ML
1 INJECTION INTRAMUSCULAR
Status: DISCONTINUED | OUTPATIENT
Start: 2021-07-25 | End: 2021-07-26 | Stop reason: HOSPADM

## 2021-07-25 RX ORDER — LEVOTHYROXINE SODIUM 0.07 MG/1
75 TABLET ORAL
Status: DISCONTINUED | OUTPATIENT
Start: 2021-07-25 | End: 2021-07-26 | Stop reason: HOSPADM

## 2021-07-25 RX ORDER — DIPHENHYDRAMINE HYDROCHLORIDE 50 MG/ML
12.5 INJECTION INTRAMUSCULAR; INTRAVENOUS
Status: DISCONTINUED | OUTPATIENT
Start: 2021-07-25 | End: 2021-07-26 | Stop reason: HOSPADM

## 2021-07-25 RX ORDER — ONDANSETRON 4 MG/1
4 TABLET, ORALLY DISINTEGRATING ORAL EVERY 4 HOURS PRN
Status: DISCONTINUED | OUTPATIENT
Start: 2021-07-25 | End: 2021-07-26 | Stop reason: HOSPADM

## 2021-07-25 RX ORDER — LIDOCAINE HYDROCHLORIDE 40 MG/ML
SOLUTION TOPICAL PRN
Status: DISCONTINUED | OUTPATIENT
Start: 2021-07-25 | End: 2021-07-25 | Stop reason: SURG

## 2021-07-25 RX ORDER — OXYCODONE HYDROCHLORIDE 5 MG/1
5 TABLET ORAL
Status: DISCONTINUED | OUTPATIENT
Start: 2021-07-25 | End: 2021-07-26 | Stop reason: HOSPADM

## 2021-07-25 RX ORDER — LABETALOL HYDROCHLORIDE 5 MG/ML
10 INJECTION, SOLUTION INTRAVENOUS EVERY 4 HOURS PRN
Status: DISCONTINUED | OUTPATIENT
Start: 2021-07-25 | End: 2021-07-26 | Stop reason: HOSPADM

## 2021-07-25 RX ORDER — POLYETHYLENE GLYCOL 3350 17 G/17G
1 POWDER, FOR SOLUTION ORAL
Status: DISCONTINUED | OUTPATIENT
Start: 2021-07-25 | End: 2021-07-26 | Stop reason: HOSPADM

## 2021-07-25 RX ORDER — HYDROMORPHONE HYDROCHLORIDE 2 MG/ML
INJECTION, SOLUTION INTRAMUSCULAR; INTRAVENOUS; SUBCUTANEOUS PRN
Status: DISCONTINUED | OUTPATIENT
Start: 2021-07-25 | End: 2021-07-25 | Stop reason: SURG

## 2021-07-25 RX ORDER — ONDANSETRON 2 MG/ML
4 INJECTION INTRAMUSCULAR; INTRAVENOUS
Status: DISCONTINUED | OUTPATIENT
Start: 2021-07-25 | End: 2021-07-26 | Stop reason: HOSPADM

## 2021-07-25 RX ADMIN — ONDANSETRON 4 MG: 4 TABLET, ORALLY DISINTEGRATING ORAL at 17:23

## 2021-07-25 RX ADMIN — PROPOFOL 100 MG: 10 INJECTION, EMULSION INTRAVENOUS at 14:18

## 2021-07-25 RX ADMIN — HYDROMORPHONE HYDROCHLORIDE 2 MG: 2 INJECTION, SOLUTION INTRAMUSCULAR; INTRAVENOUS; SUBCUTANEOUS at 14:17

## 2021-07-25 RX ADMIN — POVIDONE IODINE 15 ML: 100 SOLUTION TOPICAL at 13:28

## 2021-07-25 RX ADMIN — CEFAZOLIN 2 G: 330 INJECTION, POWDER, FOR SOLUTION INTRAMUSCULAR; INTRAVENOUS at 14:19

## 2021-07-25 RX ADMIN — ROCURONIUM BROMIDE 20 MG: 10 INJECTION, SOLUTION INTRAVENOUS at 14:18

## 2021-07-25 RX ADMIN — MIDAZOLAM HYDROCHLORIDE 2 MG: 1 INJECTION, SOLUTION INTRAMUSCULAR; INTRAVENOUS at 14:16

## 2021-07-25 RX ADMIN — FENTANYL CITRATE 100 MCG: 50 INJECTION, SOLUTION INTRAMUSCULAR; INTRAVENOUS at 14:54

## 2021-07-25 RX ADMIN — ONDANSETRON 4 MG: 2 INJECTION INTRAMUSCULAR; INTRAVENOUS at 14:16

## 2021-07-25 RX ADMIN — LIDOCAINE HYDROCHLORIDE 4 ML: 40 SOLUTION TOPICAL at 14:19

## 2021-07-25 RX ADMIN — SODIUM CHLORIDE, POTASSIUM CHLORIDE, SODIUM LACTATE AND CALCIUM CHLORIDE: 600; 310; 30; 20 INJECTION, SOLUTION INTRAVENOUS at 14:55

## 2021-07-25 RX ADMIN — SODIUM CHLORIDE, POTASSIUM CHLORIDE, SODIUM LACTATE AND CALCIUM CHLORIDE: 600; 310; 30; 20 INJECTION, SOLUTION INTRAVENOUS at 13:26

## 2021-07-25 RX ADMIN — KETOROLAC TROMETHAMINE 30 MG: 30 INJECTION, SOLUTION INTRAMUSCULAR at 16:05

## 2021-07-25 RX ADMIN — SODIUM CHLORIDE 1 G: 900 INJECTION INTRAVENOUS at 22:02

## 2021-07-25 ASSESSMENT — COGNITIVE AND FUNCTIONAL STATUS - GENERAL
DAILY ACTIVITIY SCORE: 18
MOVING TO AND FROM BED TO CHAIR: A LITTLE
MOVING FROM LYING ON BACK TO SITTING ON SIDE OF FLAT BED: A LITTLE
STANDING UP FROM CHAIR USING ARMS: A LITTLE
HELP NEEDED FOR BATHING: A LITTLE
SUGGESTED CMS G CODE MODIFIER MOBILITY: CK
DRESSING REGULAR UPPER BODY CLOTHING: A LITTLE
SUGGESTED CMS G CODE MODIFIER DAILY ACTIVITY: CK
TOILETING: A LITTLE
PERSONAL GROOMING: A LITTLE
DRESSING REGULAR LOWER BODY CLOTHING: A LITTLE
TURNING FROM BACK TO SIDE WHILE IN FLAT BAD: A LITTLE
MOBILITY SCORE: 18
EATING MEALS: A LITTLE
WALKING IN HOSPITAL ROOM: A LITTLE
CLIMB 3 TO 5 STEPS WITH RAILING: A LITTLE

## 2021-07-25 ASSESSMENT — FIBROSIS 4 INDEX: FIB4 SCORE: 0.69

## 2021-07-25 ASSESSMENT — LIFESTYLE VARIABLES
HAVE YOU EVER FELT YOU SHOULD CUT DOWN ON YOUR DRINKING: NO
HOW MANY TIMES IN THE PAST YEAR HAVE YOU HAD 5 OR MORE DRINKS IN A DAY: 0
AVERAGE NUMBER OF DAYS PER WEEK YOU HAVE A DRINK CONTAINING ALCOHOL: 0
ALCOHOL_USE: NO
TOTAL SCORE: 0
EVER FELT BAD OR GUILTY ABOUT YOUR DRINKING: NO
EVER HAD A DRINK FIRST THING IN THE MORNING TO STEADY YOUR NERVES TO GET RID OF A HANGOVER: NO
TOTAL SCORE: 0
ON A TYPICAL DAY WHEN YOU DRINK ALCOHOL HOW MANY DRINKS DO YOU HAVE: 0
TOTAL SCORE: 0
HAVE PEOPLE ANNOYED YOU BY CRITICIZING YOUR DRINKING: NO
CONSUMPTION TOTAL: NEGATIVE

## 2021-07-25 ASSESSMENT — PAIN DESCRIPTION - PAIN TYPE
TYPE: ACUTE PAIN
TYPE: SURGICAL PAIN

## 2021-07-25 ASSESSMENT — PATIENT HEALTH QUESTIONNAIRE - PHQ9
1. LITTLE INTEREST OR PLEASURE IN DOING THINGS: NOT AT ALL
SUM OF ALL RESPONSES TO PHQ9 QUESTIONS 1 AND 2: 0
2. FEELING DOWN, DEPRESSED, IRRITABLE, OR HOPELESS: NOT AT ALL

## 2021-07-25 ASSESSMENT — PAIN SCALES - GENERAL: PAIN_LEVEL: 0

## 2021-07-25 NOTE — ANESTHESIA PROCEDURE NOTES
Airway    Date/Time: 7/25/2021 2:19 PM  Performed by: Milo Patel M.D.  Authorized by: Milo Patel M.D.     Location:  OR  Urgency:  Elective  Difficult Airway: No    Indications for Airway Management:  Anesthesia      Spontaneous Ventilation: absent    Sedation Level:  Deep  Preoxygenated: Yes    Patient Position:  Sniffing  Mask Difficulty Assessment:  1 - vent by mask  Final Airway Type:  Endotracheal airway  Final Endotracheal Airway:  ETT  Cuffed: Yes    Technique Used for Successful ETT Placement:  Direct laryngoscopy    Insertion Site:  Oral  Blade Type:  Ananda  Laryngoscope Blade/Videolaryngoscope Blade Size:  3  ETT Size (mm):  7.5  Measured from:  Lips  ETT to Lips (cm):  21  Placement Verified by: auscultation and capnometry    Cormack-Lehane Classification:  Grade IIa - partial view of glottis  Number of Attempts at Approach:  1  Number of Other Approaches Attempted:  0

## 2021-07-25 NOTE — H&P
"[facility] Medicine History & Physical Note    Date of Service  7/25/2021    PCP: Lula Heath M.D.    CC:  Leg pain    HPI:   This is a 55 y.o. female with fracture of her left leg. She was walking in park and slipped, fell onto her left side. Unable to ambulate on the limb, pain is 3/10, tolerable, \"like a bad headache\". Hx hypothyroidism, otherwise no medical history. ED consulted Ortho, pending surgical repair.     She denies CP or SOB, no lightheadedness, no recent illness.     I discussed this patient's case with Dr Griffiths of the emergency department.       Consultants:   Ortho    ROS: As above. The remainder of a complete review of systems is negative in all systems except as noted.    PMHx:   has a past medical history of Thyroid disease.    PSHx:   has no past surgical history on file.    SHx:   reports that she has never smoked. She has never used smokeless tobacco. She reports that she does not drink alcohol and does not use drugs.    FHx:  Reviewed with patient, no pertinent family history noted.    Allergies:  Allergies   Allergen Reactions   • Pcn [Penicillins] Shortness of Breath and Rash     Rash, SOB, tachycardia       Medications:  No current facility-administered medications on file prior to encounter.     Current Outpatient Medications on File Prior to Encounter   Medication Sig Dispense Refill   • Cholecalciferol (D3 PO) Take 1 capsule by mouth every day.     • levothyroxine (SYNTHROID) 75 MCG Tab Take 1 Tab by mouth Every morning on an empty stomach. 90 Tab 3       Objective Exam:  Vitals:    07/25/21 0810 07/25/21 0837 07/25/21 0917 07/25/21 1003   BP:  124/51 106/61 101/58   Pulse: 77 63 69 68   Resp:  16 17 16   Temp:  36.9 °C (98.4 °F) 37.5 °C (99.5 °F) 37.4 °C (99.3 °F)   TempSrc:  Temporal Oral Temporal   SpO2: 100% 99% 100% 97%   Weight:       Height:           Physical Exam  Vitals and nursing note reviewed.   Constitutional:       General: She is not in acute distress.     Appearance: " She is not diaphoretic.   HENT:      Head: Normocephalic.      Mouth/Throat:      Mouth: Mucous membranes are moist.   Eyes:      General: No scleral icterus.     Conjunctiva/sclera: Conjunctivae normal.   Cardiovascular:      Rate and Rhythm: Normal rate.      Heart sounds: Normal heart sounds.   Pulmonary:      Effort: Pulmonary effort is normal. No respiratory distress.      Breath sounds: No stridor. No wheezing.   Abdominal:      General: There is no distension.      Palpations: Abdomen is soft.      Tenderness: There is no guarding.   Musculoskeletal:         General: Tenderness present. No swelling.      Cervical back: Normal range of motion. No rigidity.      Comments: LLE bandaged, CDI   Skin:     General: Skin is warm and dry.      Findings: No erythema or rash.   Neurological:      General: No focal deficit present.      Mental Status: She is alert and oriented to person, place, and time.   Psychiatric:         Mood and Affect: Mood normal.         Thought Content: Thought content normal.         Laboratory--reviewed personally and are as follows:  Lab Results   Component Value Date/Time    WBC 6.4 07/25/2021 06:02 AM    RBC 4.44 07/25/2021 06:02 AM    HEMOGLOBIN 13.2 07/25/2021 06:02 AM    HEMATOCRIT 41.4 07/25/2021 06:02 AM    MCV 93.2 07/25/2021 06:02 AM    MCH 29.7 07/25/2021 06:02 AM    MCHC 31.9 (L) 07/25/2021 06:02 AM    MPV 10.0 07/25/2021 06:02 AM    NEUTSPOLYS 45.80 07/25/2021 06:02 AM    LYMPHOCYTES 41.50 (H) 07/25/2021 06:02 AM    MONOCYTES 7.70 07/25/2021 06:02 AM    EOSINOPHILS 4.20 07/25/2021 06:02 AM    BASOPHILS 0.50 07/25/2021 06:02 AM      Lab Results   Component Value Date/Time    SODIUM 141 07/25/2021 06:02 AM    POTASSIUM 3.8 07/25/2021 06:02 AM    CHLORIDE 104 07/25/2021 06:02 AM    CO2 23 07/25/2021 06:02 AM    GLUCOSE 134 (H) 07/25/2021 06:02 AM    BUN 28 (H) 07/25/2021 06:02 AM    CREATININE 0.62 07/25/2021 06:02 AM      No results found for: PROTHROMBTM, INR      Radiology  DX-TIBIA AND FIBULA LEFT   Final Result      Fractures of the mid to distal tibial and fibular diaphyses.          Assessment/Plan:  * Closed fracture of left lower extremity  Assessment & Plan  Mid-distal TF diaphyses fracture  Ortho consulted for surgery  Pain seems well controlled, updated her she has PRN medications if needed  PT OT after repair  DC planning    Type 2 diabetes mellitus without complication, without long-term current use of insulin (HCC)- (present on admission)  Assessment & Plan  Not on insulin  Monitor sugars    Acquired hypothyroidism- (present on admission)  Assessment & Plan  Continue replacement       VTE prophylaxis: heparin

## 2021-07-25 NOTE — ED NOTES
Assumed care of pt.  Covid cultures collected and sent to lab.  Pt aware of NPO status, states last ate/drank around 1830 last NOC.  Pt denies needs for pain medication at this time.

## 2021-07-25 NOTE — OR SURGEON
Post OP Note    PreOp Diagnosis: Left tibia and fibula fracture      PostOp Diagnosis: Same      Procedure(s):  INSERTION, INTRAMEDULLARY JONAS, TIBIA - Wound Class: Clean    Surgeon(s):  Charly Ochoa M.D.    Anesthesiologist/Type of Anesthesia:  Anesthesiologist: Milo Patel M.D./General    Surgical Staff:  Circulator: Za Martins R.N.  Scrub Person: Scotty Calvo  Radiology Technologist: Rocio Kearney    Specimens removed if any:  * No specimens in log *    Estimated Blood Loss: 45 cc    Findings: Slightly comminuted and transverse fracture of the tibia and fibula    Complications:   Apparent distal screw, medial to lateral, removed    Implants [employer] 8.5 mm x 31 cm intramedullary nail  4.5 mm x 25 mm distal screw  4.5 mm x 27.5 mm distal screw  4.5 x 55 mm proximal screw    Operative indications Cecilia is a 55-year-old female presented to the emergency department today after a fall from approximately 3 feet where she sustained a closed midshaft tibia and fibula fracture.  After reviewing her x-rays and discussing treatment options I did recommend intramedullary nail.  Risks and benefits of this procedure including but not limited to damage surrounding nerves, arteries, veins, infection, nonunion, malunion, need for reoperation were all discussed.  Despite these risks the patient did consent.    Operation in detail: On 7/25/2021 the patient was seen in the preoperative area.  Her left leg was marked.  All of her questions were answered and consent was obtained.  Patient was brought to the operating room placed in the supine position.  General anesthesia was administered.  A formal timeout was performed identifying the left leg is a correct leg as well as correct procedure.  Perioperative Ancef was given.  The left leg was then prepped marked and draped in the normal sterile fashion.    Using the bone foam, the leg was placed in approximately 25 degrees of flexion at the knee.  A  3 cm incision was made proximally to the patella.  The quad tendon was then incised longitudinally, and blunt dissection is to clear any adhesions in the knee.  Using this from [employer] guide, the patella was slid laterally and a K wire passed just medial to the lateral eminence.  Lateral view showed starting point was just on the anterior aspect of the tibial plateau.  K wires driven down and overreamed with the opening reamer.  A K wire was slightly bent and passed across the fracture site in a reduced fashion. With the guidewire in the center center position distally, I measured at 32 cm.  I elected to proceed with a 31 cm nail.  I started with a 9.0 mm reamer, and had significant chatter.  I was able to pass a 9.5 mm reamer.  With the fracture reduced and 8.5 mm x 31 cm nail was passed without complication.  There was gentle distraction at the fracture site.  Using the perfect [place] technique a medial to lateral screw was placed distally using a small skin incision and blunt dissection down onto the tibia.  A second screw was placed medial to lateral, however it missed posteriorly.  Therefore I did elect to proceed with a second screw from anterior to posterior with a small skin incision.  Final x-rays showed a good positioning of the screws.  No cortex was broken distally.  I gently malleted in backslash to the proximal guide, and this reduced the fracture.  Using a small skin incision anterolaterally, and blunt dissection down to the bone, a dynamic screw was drilled measured and placed.  Final x-rays were obtained showing a good reduction of the fracture as well as good positioning of the screws.    The wounds were copiously irrigated and the proximal most incision closed in a layered fashion with 0 Vicryl for the quad tendon, 2-0 Vicryl for the subcutaneous tissue and 3-0 nylon for the skin.  Skin incisions distally were closed with 2-0 Vicryl 3-0 nylon interrupted sutures.  Xeroform soft roll and an  Ace bandage was applied.    Patient tolerated the procedure well.  She was brought to recovery room in good condition.  She will ice and elevate this in house.  We will get her into PT.      7/25/2021 4:08 PM Charly Ochoa M.D.

## 2021-07-25 NOTE — ANESTHESIA TIME REPORT
Anesthesia Start and Stop Event Times     Date Time Event    7/25/2021 1403 Ready for Procedure     1413 Anesthesia Start     1614 Anesthesia Stop        Responsible Staff  07/25/21    Name Role Begin End    Milo Patel M.D. Anesth 1413 1614        Preop Diagnosis (Free Text):  Pre-op Diagnosis     left midshaft tibia fracture        Preop Diagnosis (Codes):    Post op Diagnosis  Tibia/fibula fracture, left, closed, initial encounter      Premium Reason  E. Weekend    Comments:

## 2021-07-25 NOTE — ANESTHESIA PREPROCEDURE EVALUATION
Relevant Problems   ENDO   (positive) Acquired hypothyroidism   (positive) Type 2 diabetes mellitus without complication, without long-term current use of insulin (HCC)       Physical Exam    Airway   Mallampati: II  TM distance: >3 FB  Neck ROM: full       Cardiovascular - normal exam  Rhythm: regular  Rate: normal  (-) murmur     Dental - normal exam           Pulmonary - normal exam  Breath sounds clear to auscultation     Abdominal    Neurological - normal exam                 Anesthesia Plan    ASA 2       Plan - general       Airway plan will be ETT          Induction: intravenous    Postoperative Plan: Postoperative administration of opioids is intended.    Pertinent diagnostic labs and testing reviewed    Informed Consent:    Anesthetic plan and risks discussed with patient.    Use of blood products discussed with: patient whom consented to blood products.

## 2021-07-25 NOTE — ANESTHESIA POSTPROCEDURE EVALUATION
Patient: Cecilia Taveras    Procedure Summary     Date: 07/25/21 Room / Location:  OR  / SURGERY [facility]    Anesthesia Start: 1413 Anesthesia Stop: 1614    Procedure: INSERTION, INTRAMEDULLARY JONSA, TIBIA (Left Leg Lower) Diagnosis: (left midshaft tibia fracture)    Surgeons: Charly Ochoa M.D. Responsible Provider: Milo Patel M.D.    Anesthesia Type: general ASA Status: 2          Final Anesthesia Type: general  Last vitals  BP   Blood Pressure: 125/61    Temp   36.5 °C (97.7 °F)    Pulse   85   Resp   16    SpO2   99 %      Anesthesia Post Evaluation    Patient location during evaluation: PACU  Patient participation: complete - patient participated  Level of consciousness: awake and alert  Pain score: 0    Airway patency: patent  Anesthetic complications: no  Cardiovascular status: hemodynamically stable  Respiratory status: acceptable  Hydration status: euvolemic    PONV: none          There were no known complications for this encounter.     Nurse Pain Score: 0 (NPRS)

## 2021-07-25 NOTE — ED NOTES
Med rec updated and complete  Allergies reviewed  Pt reports no antibiotics in the last 30 days.    No current facility-administered medications on file prior to encounter.     Current Outpatient Medications on File Prior to Encounter   Medication Sig Dispense Refill   • Cholecalciferol (D3 PO) Take 1 capsule by mouth every day.     • levothyroxine (SYNTHROID) 75 MCG Tab Take 1 Tab by mouth Every morning on an empty stomach. 90 Tab 3

## 2021-07-25 NOTE — OR NURSING
1611: Patient to PACU from OR. No airway in place.  Respirations even an unlabored.  Breath sounds clear and equal bilaterally.  Dressings to left leg, posterior tibial pulse 2+, toes pink and warm, cap refill <3 seconds.  1625: Dr. Ochoa at bedside, speaking with patient briefly.  Gave verbal order for  mg daily starting tomorrow, order placed in Epic.  1628: X-ray at bedside.  1640: No changes, meets criteria to go back to floor, report called to THELMA Myles.  1649: Patient assisted with bedpan, unable to void.  's phone number called, unable to connect.   1653: Patient taken back to room via transport, oxygen tank full.

## 2021-07-25 NOTE — ED PROVIDER NOTES
"ED Provider Note  CHIEF COMPLAINT  Chief Complaint   Patient presents with   • Leg Deformity     left tib-fib injury       OUMOU Taveras is a 55 y.o. female who presents with pain in her left leg.  Patient was out at the park when she slipped on a step and fell injuring her left lower leg.  She is unable to ambulate on it.  She denies any numbness or tingling to her foot.  She denies any ankle pain or knee pain.  She denies any other injuries from the fall.  No history of any medical problems except thyroid problems.  Not on anticoagulation.  No previous injury.  No coolness to the leg.    REVIEW OF SYSTEMS  Positive for leg pain, Negative for numbness, coolness, ankle or knee pain, other injuries.    PAST MEDICAL HISTORY   has a past medical history of Thyroid disease.    SOCIAL HISTORY  Social History     Tobacco Use   • Smoking status: Never Smoker   • Smokeless tobacco: Never Used   Vaping Use   • Vaping Use: Never used   Substance and Sexual Activity   • Alcohol use: No   • Drug use: No   • Sexual activity: Yes     Partners: Male     Birth control/protection: Pill       SURGICAL HISTORY  patient denies any surgical history    CURRENT MEDICATIONS  Reviewed.  See Encounter Summary.      ALLERGIES  Allergies   Allergen Reactions   • Pcn [Penicillins] Shortness of Breath and Rash     Rash, SOB, tachycardia       PHYSICAL EXAM  VITAL SIGNS: /55   Pulse 64   Temp 37.1 °C (98.7 °F) (Temporal)   Resp 18   Ht 1.575 m (5' 2\")   Wt 48.1 kg (106 lb)   SpO2 98%   BMI 19.39 kg/m²   Constitutional:  Alert in no apparent distress.  HENT: Normocephalic, Bilateral external ears normal. Nose normal.   Eyes: Pupils are equal and reactive. Conjunctiva normal, non-icteric.   Thorax & Lungs: Easy unlabored respirations  Abdomen:  No gross signs of peritonitis, no pain with movement   Skin: Visualized skin is  Dry, superficial abrasion to the left anterior shin.  Extremities:   Deformity to the mid tib-fib area of the " left leg, able to wiggle toes, no pain with range of motion of ankle or knee, soft compartments, +2 DP pulse, intact sensation to foot  Neurologic: Alert, Grossly non-focal.   Psychiatric: Affect and Mood normal    Radiology  DX-TIBIA AND FIBULA LEFT   Final Result      Fractures of the mid to distal tibial and fibular diaphyses.          COURSE & MEDICAL DECISION MAKING  Nursing notes and vital signs were reviewed. (See chart for details)    The patient presents to the Emergency Department with left leg pain.  Obvious deformity on exam.  Neurovascularly intact.  No other injuries.  Soft compartments.  Discussed pain medication but patient states she is comfortable and does not require any pain medicines.    X-ray shows evidence of a mid tibial and fibular fracture.  Patient placed in a sugar tong and posterior splint.  Will consult Ortho.    Patient states she was scheduled to get her second vaccine today.  Therefore she is not fully vaccinated and a Covid test will be ordered.  Patient is resting comfortably in her splint.  She is neurovascularly intact after splint application.    Discussed the case with Dr. Ochoa who is on-call for surgery.  Requests patient be hospitalized under the hospitalist and he will take the patient to the OR today.    Patient be hospitalized in guarded condition.  Discussed with the hospitalist who will see the patient.    FINAL IMPRESSION  1. Closed displaced transverse fracture of shaft of left tibia, initial encounter     2. Closed displaced transverse fracture of shaft of left fibula, initial encounter

## 2021-07-25 NOTE — CONSULTS
Orthopaedic Surgery Consult Note:    Charly Ochoa M.D.  Date & Time note created:    7/25/2021   2:00 PM     Referring MD:  Dr. Brooks is a very pleasant 55-year-old female who presented today for evaluation of her left leg.  Patient was working out at the park today when she had a fall off of one of the pieces of equipment.  She fell against the step, and heard a snap.  She was unable to ambulate thereafter.    Patient ID:   Name:             Cecilia Taveras     YOB: 1965  Age:                 55 y.o.  female   MRN:               [id number]                                                             Reason for Consult:      Left leg pain    History of Present Illness:    Cecilia is a very pleasant 55-year-old female who presented today for evaluation of her left leg.  Patient was working out at the park today when she had a fall off of one of the pieces of equipment.  She fell against the step, and heard a snap.  She was unable to ambulate thereafter.  She denies any numbness tingling or paresthesias.  She denies any other injuries.        Review of Systems:      Constitutional: Denies fevers, Denies weight changes  Eyes: Denies changes in vision, no eye pain  Ears/Nose/Throat/Mouth: Denies nasal congestion or sore throat   Cardiovascular: Denies chest pain   Respiratory: Denies shortness of breath , Denies cough  Gastrointestinal/Hepatic: Denies abdominal pain, nausea, vomiting, diarrhea, constipation or GI bleeding   Genitourinary: Denies dysuria or frequency  Musculoskeletal/Rheum: left leg pain  Skin: Denies rash  Neurological: Denies headache, confusion, memory loss or focal weakness/parasthesias  Psychiatric: denies mood disorder   Endocrine: Maritza thyroid problems  Heme/Oncology/Lymph Nodes: Denies enlarged lymph nodes, denies brusing or known bleeding disorder  All other systems were reviewed and are negative (AMA/CMS criteria)                Past Medical History:   Past Medical History:    Diagnosis Date   • Thyroid disease      Active Hospital Problems    Diagnosis    • Closed fracture of left lower extremity [S82.92XA]    • Type 2 diabetes mellitus without complication, without long-term current use of insulin (HCC) [E11.9]    • Acquired hypothyroidism [E03.9]        Past Surgical History:  No past surgical history on file.    Hospital Medications:    Current Facility-Administered Medications:   •  [MAR Hold] morphine (pf) 4 mg/mL injection 4 mg, 4 mg, Intravenous, Once, Vanessa Dias D.O.  •  [MAR Hold] ondansetron (ZOFRAN) syringe/vial injection 4 mg, 4 mg, Intravenous, Once, TEVIN BoltonO.  •  [MAR Hold] levothyroxine (SYNTHROID) tablet 75 mcg, 75 mcg, Oral, AM ES, Kingsley Rogers M.D.  •  [MAR Hold] senna-docusate (PERICOLACE or SENOKOT S) 8.6-50 MG per tablet 2 tablet, 2 tablet, Oral, BID **AND** [MAR Hold] polyethylene glycol/lytes (MIRALAX) PACKET 1 Packet, 1 Packet, Oral, QDAY PRN **AND** [MAR Hold] magnesium hydroxide (MILK OF MAGNESIA) suspension 30 mL, 30 mL, Oral, QDAY PRN **AND** [MAR Hold] bisacodyl (DULCOLAX) suppository 10 mg, 10 mg, Rectal, QDAY PRN, Kingsley Rogers M.D.  •  [MAR Hold] heparin injection 5,000 Units, 5,000 Units, Subcutaneous, Q8HRS, Kingsley Rogers M.D.  •  [MAR Hold] acetaminophen (Tylenol) tablet 650 mg, 650 mg, Oral, Q6HRS PRN, Kingsley Rogers M.D.  •  Notify provider if pain remains uncontrolled, , , CONTINUOUS **AND** Use the Numeric Rating Scale (NRS), Kamara-Baker Faces (WBF), or FLACC on regular floors and Critical-Care Pain Observation Tool (CPOT) on ICUs/Trauma to assess pain, , , CONTINUOUS **AND** Pulse Ox, , , CONTINUOUS **AND** [MAR Hold] Pharmacy Consult Request ...Pain Management Review 1 Each, 1 Each, Other, PHARMACY TO DOSE **AND** If patient difficult to arouse and/or has respiratory depression (respiratory rate of 10 or less), stop any opiates that are currently infusing and call a Rapid Response., , , CONTINUOUS, Kingsley Rogers,  M.D.  •  [MAR Hold] oxyCODONE immediate-release (ROXICODONE) tablet 2.5 mg, 2.5 mg, Oral, Q3HRS PRN **OR** [MAR Hold] oxyCODONE immediate-release (ROXICODONE) tablet 5 mg, 5 mg, Oral, Q3HRS PRN **OR** [MAR Hold] HYDROmorphone (Dilaudid) injection 0.25 mg, 0.25 mg, Intravenous, Q3HRS PRN, Kingsley Rogers M.D.  •  [MAR Hold] cloNIDine (CATAPRES) tablet 0.1 mg, 0.1 mg, Oral, Q6HRS PRN, Kingsley Rogers M.D.  •  [MAR Hold] enalaprilat (VASOTEC) injection 1.25 mg, 1.25 mg, Intravenous, Q6HRS PRN, Kingsley Rogers M.D.  •  [MAR Hold] labetalol (NORMODYNE/TRANDATE) injection 10 mg, 10 mg, Intravenous, Q4HRS PRN, Kingsley Rogers M.D.  •  [MAR Hold] ondansetron (ZOFRAN) syringe/vial injection 4 mg, 4 mg, Intravenous, Q4HRS PRN, Kingsley Rogers M.D.  •  [MAR Hold] ondansetron (ZOFRAN ODT) dispertab 4 mg, 4 mg, Oral, Q4HRS PRN, Kingsley Rogers M.D.  •  [MAR Hold] promethazine (PHENERGAN) tablet 12.5-25 mg, 12.5-25 mg, Oral, Q4HRS PRN, Kingsley Rogers M.D.  •  [MAR Hold] promethazine (PHENERGAN) suppository 12.5-25 mg, 12.5-25 mg, Rectal, Q4HRS PRN, Kingsley Rogers M.D.  •  [MAR Hold] prochlorperazine (COMPAZINE) injection 5-10 mg, 5-10 mg, Intravenous, Q4HRS PRN, Kingsley Rogers M.D.  •  [MAR Hold] insulin regular (HumuLIN R,NovoLIN R) injection, 1-6 Units, Subcutaneous, Q6HRS **AND** POC blood glucose manual result, , , Q6H **AND** NOTIFY MD and PharmD, , , Once **AND** [MAR Hold] glucose 4 g chewable tablet 16 g, 16 g, Oral, Q15 MIN PRN **AND** [MAR Hold] dextrose 50% (D50W) injection 50 mL, 50 mL, Intravenous, Q15 MIN PRN, Kingsley Rogers M.D.  •  lactated ringers infusion, , Intravenous, Continuous, Charly Ochoa M.D., Last Rate: 10 mL/hr at 07/25/21 1326, New Bag at 07/25/21 1326    Current Outpatient Medications:  Medications Prior to Admission   Medication Sig Dispense Refill Last Dose   • Cholecalciferol (D3 PO) Take 1 capsule by mouth every day.   7/24/2021 at 0600   • levothyroxine (SYNTHROID) 75 MCG Tab  Take 1 Tab by mouth Every morning on an empty stomach. 90 Tab 3 7/24/2021 at 0600       Medication Allergy:  Allergies   Allergen Reactions   • Pcn [Penicillins] Shortness of Breath and Rash     Rash, SOB, tachycardia       Family History:  Family History   Problem Relation Age of Onset   • Diabetes Mother    • Diabetes Father    • Diabetes Maternal Uncle    • Diabetes Maternal Grandmother    • Diabetes Paternal Grandfather    • Cancer Neg Hx    • Heart Disease Neg Hx    • Hypertension Neg Hx    • Stroke Neg Hx        Social History:  Social History     Socioeconomic History   • Marital status:      Spouse name: Not on file   • Number of children: Not on file   • Years of education: Not on file   • Highest education level: Not on file   Occupational History   • Not on file   Tobacco Use   • Smoking status: Never Smoker   • Smokeless tobacco: Never Used   Vaping Use   • Vaping Use: Never used   Substance and Sexual Activity   • Alcohol use: No   • Drug use: No   • Sexual activity: Yes     Partners: Male     Birth control/protection: Pill   Other Topics Concern   • Not on file   Social History Narrative   • Not on file     Social Determinants of Health     Financial Resource Strain:    • Difficulty of Paying Living Expenses:    Food Insecurity:    • Worried About Running Out of Food in the Last Year:    • Ran Out of Food in the Last Year:    Transportation Needs:    • Lack of Transportation (Medical):    • Lack of Transportation (Non-Medical):    Physical Activity:    • Days of Exercise per Week:    • Minutes of Exercise per Session:    Stress:    • Feeling of Stress :    Social Connections:    • Frequency of Communication with Friends and Family:    • Frequency of Social Gatherings with Friends and Family:    • Attends [employer] Services:    • Active Member of Clubs or Organizations:    • Attends Club or Organization Meetings:    • Marital Status:    Intimate Partner Violence:    • Fear of Current or  "Ex-Partner:    • Emotionally Abused:    • Physically Abused:    • Sexually Abused:          Physical Exam:  Vitals/ General Appearance:   Weight/BMI: Body mass index is 19.39 kg/m².  /56   Pulse 75   Temp 37.4 °C (99.3 °F) (Temporal)   Resp 16   Ht 1.575 m (5' 2\")   Wt 48.1 kg (106 lb)   SpO2 97%   Vitals:    07/25/21 0917 07/25/21 1003 07/25/21 1200 07/25/21 1330   BP: 106/61 101/58  110/56   Pulse: 69 68  75   Resp: 17 16  16   Temp: 37.5 °C (99.5 °F) 37.4 °C (99.3 °F)  37.4 °C (99.3 °F)   TempSrc: Oral Temporal  Temporal   SpO2: 100% 97% 96% 97%   Weight:       Height:           Constitutional:   Well developed, Well nourished, No acute distress  HENMT:  Normocephalic, Atraumatic, Oropharynx moist mucous membranes, No oral exudates, Nose normal.  No thyromegaly.  Eyes:  EOMI, Conjunctiva normal, No discharge.  Neck:  Normal range of motion, No cervical tenderness,  no JVD.  Cardiovascular:  Regular rate and rhythm  Lungs:  Normal breathing  Abdomen: Soft, non-tender, non-distended.  Skin: Warm, Dry, No erythema, No rash, no induration.  Neurologic: Alert & oriented x 3, No focal deficits noted, cranial nerves II through X are grossly intact.  Psychiatric: Affect normal, Judgment normal, Mood normal.  Musculoskeletal: Left leg: Mild swelling.  Compartments are soft.  EHL/FHL/tibialis anterior function are intact.  Toes are warm well perfused with good capillary refill.  Palpable dorsalis pedis, posterior tibialis pulse.  No pain with passive range of motion of her toes.    Lab Data Review:  Recent Results (from the past 24 hour(s))   CBC WITH DIFFERENTIAL    Collection Time: 07/25/21  6:02 AM   Result Value Ref Range    WBC 6.4 4.8 - 10.8 K/uL    RBC 4.44 4.20 - 5.40 M/uL    Hemoglobin 13.2 12.0 - 16.0 g/dL    Hematocrit 41.4 37.0 - 47.0 %    MCV 93.2 81.4 - 97.8 fL    MCH 29.7 27.0 - 33.0 pg    MCHC 31.9 (L) 33.6 - 35.0 g/dL    RDW 48.0 35.9 - 50.0 fL    Platelet Count 265 164 - 446 K/uL    MPV 10.0 " 9.0 - 12.9 fL    Neutrophils-Polys 45.80 44.00 - 72.00 %    Lymphocytes 41.50 (H) 22.00 - 41.00 %    Monocytes 7.70 0.00 - 13.40 %    Eosinophils 4.20 0.00 - 6.90 %    Basophils 0.50 0.00 - 1.80 %    Immature Granulocytes 0.30 0.00 - 0.90 %    Nucleated RBC 0.00 /100 WBC    Neutrophils (Absolute) 2.91 2.00 - 7.15 K/uL    Lymphs (Absolute) 2.64 1.00 - 4.80 K/uL    Monos (Absolute) 0.49 0.00 - 0.85 K/uL    Eos (Absolute) 0.27 0.00 - 0.51 K/uL    Baso (Absolute) 0.03 0.00 - 0.12 K/uL    Immature Granulocytes (abs) 0.02 0.00 - 0.11 K/uL    NRBC (Absolute) 0.00 K/uL   COMP METABOLIC PANEL    Collection Time: 07/25/21  6:02 AM   Result Value Ref Range    Sodium 141 135 - 145 mmol/L    Potassium 3.8 3.6 - 5.5 mmol/L    Chloride 104 96 - 112 mmol/L    Co2 23 20 - 33 mmol/L    Anion Gap 14.0 7.0 - 16.0    Glucose 134 (H) 65 - 99 mg/dL    Bun 28 (H) 8 - 22 mg/dL    Creatinine 0.62 0.50 - 1.40 mg/dL    Calcium 8.8 8.4 - 10.2 mg/dL    AST(SGOT) 23 12 - 45 U/L    ALT(SGPT) 26 2 - 50 U/L    Alkaline Phosphatase 76 30 - 99 U/L    Total Bilirubin 0.3 0.1 - 1.5 mg/dL    Albumin 4.2 3.2 - 4.9 g/dL    Total Protein 7.2 6.0 - 8.2 g/dL    Globulin 3.0 1.9 - 3.5 g/dL    A-G Ratio 1.4 g/dL   ESTIMATED GFR    Collection Time: 07/25/21  6:02 AM   Result Value Ref Range    GFR If African American >60 >60 mL/min/1.73 m 2    GFR If Non African American >60 >60 mL/min/1.73 m 2   SARS-COV Antigen GEO: Collect dry nasal swab    Collection Time: 07/25/21  7:30 AM   Result Value Ref Range    SARS-CoV-2 Source Nasal Swab     SARS-COV ANTIGEN GEO NotDetected Not-Detected       Imaging:   DX-TIBIA AND FIBULA LEFT   Final Result      Fractures of the mid to distal tibial and fibular diaphyses.      DX-PORTABLE FLUOROSCOPY < 1 HOUR Is the patient pregnant? No    (Results Pending)   DX-TIBIA AND FIBULA LEFT    (Results Pending)   X-ray left leg: Transverse fracture of the midshaft tibia with slight angulation.  Transverse fracture with shortening of the  fibula.    Assessment: 55-year-old female with a left midshaft tibia fracture.    Plan:    I discussed her diagnosis and treatment options with her.  At this time I would recommend a intramedullary nail.  Risks and benefits of this procedure were discussed.  Risks including but not limited to damage surrounding nerves, arteries, veins, infection, nonunion, malunion, need for reoperation, hardware removal, compartment syndrome were all discussed.  Despite these risks the patient did consent.  I did discuss with her my attempt to do a suprapatellar approach.  Damage to the knee itself was discussed.    Patient will be admitted to the hospitalist service.          Charly Ochoa M.D.  [facility]

## 2021-07-25 NOTE — PROGRESS NOTES
4 Eyes Skin Assessment Completed by Shar TYLER RN and Darlin JOHNSON RN.    Head WDL  Ears WDL  Nose WDL  Mouth WDL  Neck WDL  Breast/Chest WDL  Shoulder Blades WDL  Spine WDL  (R) Arm/Elbow/Hand WDL  (L) Arm/Elbow/Hand WDL  Abdomen WDL  Groin WDL  Scrotum/Coccyx/Buttocks WDL  (R) Leg WDL  (L) Leg Swelling  (R) Heel/Foot/Toe WDL  (L) Heel/Foot/Toe WDL          Devices In Places Pulse Ox      Interventions In Place N/A    Possible Skin Injury No    Pictures Uploaded Into Epic N/A  Wound Consult Placed N/A  RN Wound Prevention Protocol Ordered No

## 2021-07-25 NOTE — OR NURSING
Pt to pre op accompanied by her . Procedure, allergies and NPO status verified. Dressing observed to left leg. Blood sugar assessed, 103. Pt dressed in a surgical gown, undergarments removed, SCD applied to non affected lower extremity. LR infusion initiated. Questions and concerns addressed.

## 2021-07-25 NOTE — ED TRIAGE NOTES
Pt here for left leg deformity. States was at the park doing exercises when slipped on a stepper and landed on hands and hit leg on a bar. Obvious deformity on left lower ext above ankle. Unable to bear weight or move foot on own due to lack of bone stabilization on affected area.

## 2021-07-25 NOTE — ASSESSMENT & PLAN NOTE
Mid-distal TF diaphyses fracture  Ortho consulted for surgery  Pain seems well controlled, updated her she has PRN medications if needed  PT OT after repair  DC planning

## 2021-07-26 ENCOUNTER — PATIENT OUTREACH (OUTPATIENT)
Dept: HEALTH INFORMATION MANAGEMENT | Facility: OTHER | Age: 56
End: 2021-07-26

## 2021-07-26 VITALS
WEIGHT: 106 LBS | HEIGHT: 62 IN | HEART RATE: 56 BPM | TEMPERATURE: 97.4 F | BODY MASS INDEX: 19.51 KG/M2 | OXYGEN SATURATION: 98 % | RESPIRATION RATE: 15 BRPM | SYSTOLIC BLOOD PRESSURE: 101 MMHG | DIASTOLIC BLOOD PRESSURE: 52 MMHG

## 2021-07-26 LAB
ALBUMIN SERPL BCP-MCNC: 3.5 G/DL (ref 3.2–4.9)
ALBUMIN/GLOB SERPL: 1.3 G/DL
ALP SERPL-CCNC: 64 U/L (ref 30–99)
ALT SERPL-CCNC: 23 U/L (ref 2–50)
ANION GAP SERPL CALC-SCNC: 9 MMOL/L (ref 7–16)
AST SERPL-CCNC: 26 U/L (ref 12–45)
BASOPHILS # BLD AUTO: 0.2 % (ref 0–1.8)
BASOPHILS # BLD: 0.02 K/UL (ref 0–0.12)
BILIRUB SERPL-MCNC: 0.3 MG/DL (ref 0.1–1.5)
BUN SERPL-MCNC: 16 MG/DL (ref 8–22)
CALCIUM SERPL-MCNC: 8.5 MG/DL (ref 8.4–10.2)
CHLORIDE SERPL-SCNC: 103 MMOL/L (ref 96–112)
CO2 SERPL-SCNC: 23 MMOL/L (ref 20–33)
CREAT SERPL-MCNC: 0.56 MG/DL (ref 0.5–1.4)
EOSINOPHIL # BLD AUTO: 0 K/UL (ref 0–0.51)
EOSINOPHIL NFR BLD: 0 % (ref 0–6.9)
ERYTHROCYTE [DISTWIDTH] IN BLOOD BY AUTOMATED COUNT: 48.2 FL (ref 35.9–50)
GLOBULIN SER CALC-MCNC: 2.7 G/DL (ref 1.9–3.5)
GLUCOSE BLD-MCNC: 126 MG/DL (ref 65–99)
GLUCOSE BLD-MCNC: 138 MG/DL (ref 65–99)
GLUCOSE BLD-MCNC: 170 MG/DL (ref 65–99)
GLUCOSE BLD-MCNC: 199 MG/DL (ref 65–99)
GLUCOSE SERPL-MCNC: 138 MG/DL (ref 65–99)
HCT VFR BLD AUTO: 34.9 % (ref 37–47)
HGB BLD-MCNC: 11.3 G/DL (ref 12–16)
IMM GRANULOCYTES # BLD AUTO: 0.04 K/UL (ref 0–0.11)
IMM GRANULOCYTES NFR BLD AUTO: 0.4 % (ref 0–0.9)
LYMPHOCYTES # BLD AUTO: 1.06 K/UL (ref 1–4.8)
LYMPHOCYTES NFR BLD: 9.7 % (ref 22–41)
MCH RBC QN AUTO: 30 PG (ref 27–33)
MCHC RBC AUTO-ENTMCNC: 32.4 G/DL (ref 33.6–35)
MCV RBC AUTO: 92.6 FL (ref 81.4–97.8)
MONOCYTES # BLD AUTO: 1 K/UL (ref 0–0.85)
MONOCYTES NFR BLD AUTO: 9.2 % (ref 0–13.4)
NEUTROPHILS # BLD AUTO: 8.76 K/UL (ref 2–7.15)
NEUTROPHILS NFR BLD: 80.5 % (ref 44–72)
NRBC # BLD AUTO: 0 K/UL
NRBC BLD-RTO: 0 /100 WBC
PLATELET # BLD AUTO: 216 K/UL (ref 164–446)
PMV BLD AUTO: 10 FL (ref 9–12.9)
POTASSIUM SERPL-SCNC: 4.2 MMOL/L (ref 3.6–5.5)
PROT SERPL-MCNC: 6.2 G/DL (ref 6–8.2)
RBC # BLD AUTO: 3.77 M/UL (ref 4.2–5.4)
SODIUM SERPL-SCNC: 135 MMOL/L (ref 135–145)
WBC # BLD AUTO: 10.9 K/UL (ref 4.8–10.8)

## 2021-07-26 PROCEDURE — 97165 OT EVAL LOW COMPLEX 30 MIN: CPT

## 2021-07-26 PROCEDURE — 97161 PT EVAL LOW COMPLEX 20 MIN: CPT

## 2021-07-26 PROCEDURE — 99217 PR OBSERVATION CARE DISCHARGE: CPT | Performed by: FAMILY MEDICINE

## 2021-07-26 PROCEDURE — 82962 GLUCOSE BLOOD TEST: CPT

## 2021-07-26 PROCEDURE — 700102 HCHG RX REV CODE 250 W/ 637 OVERRIDE(OP): Performed by: HOSPITALIST

## 2021-07-26 PROCEDURE — 85025 COMPLETE CBC W/AUTO DIFF WBC: CPT

## 2021-07-26 PROCEDURE — 700102 HCHG RX REV CODE 250 W/ 637 OVERRIDE(OP): Performed by: ANESTHESIOLOGY

## 2021-07-26 PROCEDURE — 700111 HCHG RX REV CODE 636 W/ 250 OVERRIDE (IP): Performed by: HOSPITALIST

## 2021-07-26 PROCEDURE — 94760 N-INVAS EAR/PLS OXIMETRY 1: CPT

## 2021-07-26 PROCEDURE — 80053 COMPREHEN METABOLIC PANEL: CPT

## 2021-07-26 PROCEDURE — A9270 NON-COVERED ITEM OR SERVICE: HCPCS | Performed by: HOSPITALIST

## 2021-07-26 PROCEDURE — 700111 HCHG RX REV CODE 636 W/ 250 OVERRIDE (IP): Performed by: ORTHOPAEDIC SURGERY

## 2021-07-26 PROCEDURE — G0378 HOSPITAL OBSERVATION PER HR: HCPCS

## 2021-07-26 PROCEDURE — 700105 HCHG RX REV CODE 258: Performed by: ANESTHESIOLOGY

## 2021-07-26 PROCEDURE — 700102 HCHG RX REV CODE 250 W/ 637 OVERRIDE(OP): Performed by: ORTHOPAEDIC SURGERY

## 2021-07-26 PROCEDURE — 36415 COLL VENOUS BLD VENIPUNCTURE: CPT

## 2021-07-26 PROCEDURE — A9270 NON-COVERED ITEM OR SERVICE: HCPCS | Performed by: ORTHOPAEDIC SURGERY

## 2021-07-26 PROCEDURE — 700105 HCHG RX REV CODE 258: Performed by: ORTHOPAEDIC SURGERY

## 2021-07-26 PROCEDURE — A9270 NON-COVERED ITEM OR SERVICE: HCPCS | Performed by: ANESTHESIOLOGY

## 2021-07-26 RX ORDER — ONDANSETRON 4 MG/1
4 TABLET, ORALLY DISINTEGRATING ORAL EVERY 4 HOURS PRN
Qty: 10 TABLET | Refills: 0 | Status: SHIPPED | OUTPATIENT
Start: 2021-07-26 | End: 2021-11-02

## 2021-07-26 RX ORDER — OXYCODONE HYDROCHLORIDE 5 MG/1
2.5 TABLET ORAL EVERY 4 HOURS PRN
Qty: 20 TABLET | Refills: 0 | Status: SHIPPED | OUTPATIENT
Start: 2021-07-26 | End: 2021-08-02

## 2021-07-26 RX ADMIN — SODIUM CHLORIDE 1 G: 900 INJECTION INTRAVENOUS at 13:41

## 2021-07-26 RX ADMIN — SODIUM CHLORIDE, POTASSIUM CHLORIDE, SODIUM LACTATE AND CALCIUM CHLORIDE: 600; 310; 30; 20 INJECTION, SOLUTION INTRAVENOUS at 02:37

## 2021-07-26 RX ADMIN — HEPARIN SODIUM 5000 UNITS: 5000 INJECTION, SOLUTION INTRAVENOUS; SUBCUTANEOUS at 13:42

## 2021-07-26 RX ADMIN — ASPIRIN 325 MG: 325 TABLET, COATED ORAL at 05:42

## 2021-07-26 RX ADMIN — SODIUM CHLORIDE 1 G: 900 INJECTION INTRAVENOUS at 05:37

## 2021-07-26 RX ADMIN — OXYCODONE HYDROCHLORIDE 10 MG: 5 SOLUTION ORAL at 06:23

## 2021-07-26 RX ADMIN — OXYCODONE HYDROCHLORIDE 2.5 MG: 5 TABLET ORAL at 14:32

## 2021-07-26 RX ADMIN — LEVOTHYROXINE SODIUM 75 MCG: 0.07 TABLET ORAL at 05:42

## 2021-07-26 RX ADMIN — HEPARIN SODIUM 5000 UNITS: 5000 INJECTION, SOLUTION INTRAVENOUS; SUBCUTANEOUS at 05:43

## 2021-07-26 ASSESSMENT — COGNITIVE AND FUNCTIONAL STATUS - GENERAL
TOILETING: A LITTLE
DAILY ACTIVITIY SCORE: 19
SUGGESTED CMS G CODE MODIFIER MOBILITY: CJ
MOBILITY SCORE: 22
WALKING IN HOSPITAL ROOM: A LITTLE
CLIMB 3 TO 5 STEPS WITH RAILING: A LITTLE
DRESSING REGULAR LOWER BODY CLOTHING: A LITTLE
HELP NEEDED FOR BATHING: A LITTLE
PERSONAL GROOMING: A LITTLE
SUGGESTED CMS G CODE MODIFIER DAILY ACTIVITY: CK
DRESSING REGULAR UPPER BODY CLOTHING: A LITTLE

## 2021-07-26 ASSESSMENT — ACTIVITIES OF DAILY LIVING (ADL): TOILETING: INDEPENDENT

## 2021-07-26 ASSESSMENT — GAIT ASSESSMENTS
DISTANCE (FEET): 150
GAIT LEVEL OF ASSIST: SUPERVISED
DEVIATION: ANTALGIC;STEP TO;DECREASED HEEL STRIKE
ASSISTIVE DEVICE: FRONT WHEEL WALKER
DISTANCE (FEET): 10

## 2021-07-26 ASSESSMENT — PAIN DESCRIPTION - PAIN TYPE
TYPE: ACUTE PAIN;SURGICAL PAIN
TYPE: ACUTE PAIN
TYPE: ACUTE PAIN

## 2021-07-26 NOTE — FACE TO FACE
Face to Face Note  -  Durable Medical Equipment    Michelle Lopez M.D. - NPI: [id number]  I certify that this patient is under my care and that they have had a durable medical equipment(DME)face to face encounter by myself that meets the physician DME face-to-face encounter requirements with this patient on:    Date of encounter:   Patient:                    MRN:                       YOB: 2019  Cecilia Range  [id number]  1965     The encounter with the patient was in whole, or in part, for the following medical condition, which is the primary reason for durable medical equipment:  Other - Tibia fracture    I certify that, based on my findings, the following durable medical equipment is medically necessary:  Walkers.    HOME O2 Saturation Measurements:(Values must be present for Home Oxygen orders)         ,     ,         My Clinical findings support the need for the above equipment due to:  Abnormal Gait    Supporting Symptoms: n/a     ------------------------------------------------------------------------------------------------------------------    Face to Face Supporting Documentation - Home Health    The encounter with this patient was in whole or in part the primary reason for home health admission.    Date of encounter:   Patient:                    MRN:                       YOB: 2019  Cecilia Range  [id number]  1965     Home health to see patient for:  Skilled Nursing care for assessment, interventions & education, Physical Therapy evaluation and treatment and Occupational therapy evaluation and treatment    Skilled need for:  Surgical Aftercare Tibia fracture with nailing    Skilled nursing interventions to include:  Comment: n/a    Homebound evidenced status by:  Need the aid of supportive devices such as crutches, canes, wheelchairs or walkers. Leaving home must require a considerable and taxing effort. There must exist a normal inability to leave the  home.    Community Physician to provide follow up care: Lula Heath M.D.     Optional Interventions    Wound information & treatment:    Home Infusion Therapy orders:    Line/Drain/Airway:    I certify the face to face encounter for this home care referral meets the CMS requirements and the encounter/clinical assessment with the patient was, in whole, or in part, for the medical condition(s) listed above, which is the primary reason for home health care. Based on my clinical findings: the service(s) are medically necessary, support the need for home health care, and the homebound criteria are met.  I certify that this patient has had a face to face encounter by myself.  Michelle Lopez M.D. - NPI: [id number]    *Debility, frailty and advanced age in the absence of an acute deterioration or exacerbation of a condition do not qualify a patient for home health.

## 2021-07-26 NOTE — DISCHARGE INSTRUCTIONS
Cast or Splint Care, Adult  Casts and splints are supports that are worn to protect broken bones and other injuries. A cast or splint may hold a bone still and in the correct position while it heals. Casts and splints may also help to ease pain, swelling, and muscle spasms.  How to care for your cast    · Do not stick anything inside the cast to scratch your skin.  · Check the skin around the cast every day. Tell your doctor about any concerns.  · You may put lotion on dry skin around the edges of the cast. Do not put lotion on the skin under the cast.  · Keep the cast clean.  · If the cast is not waterproof:  ? Do not let it get wet.  ? Cover it with a watertight covering when you take a bath or a shower.  How to care for your splint    · Wear it as told by your doctor. Take it off only as told by your doctor.  · Loosen the splint if your fingers or toes tingle, get numb, or turn cold and blue.  · Keep the splint clean.  · If the splint is not waterproof:  ? Do not let it get wet.  ? Cover it with a watertight covering when you take a bath or a shower.  Follow these instructions at home:  Bathing  · Do not take baths or swim until your doctor says it is okay. Ask your doctor if you can take showers. You may only be allowed to take sponge baths for bathing.  · If your cast or splint is not waterproof, cover it with a watertight covering when you take a bath or shower.  Managing pain, stiffness, and swelling  · Move your fingers or toes often to avoid stiffness and to lessen swelling.  · Raise (elevate) the injured area above the level of your heart while sitting or lying down.  Safety  · Do not use the injured limb to support your body weight until your doctor says that it is okay.  · Use crutches or other assistive devices as told by your doctor.  General instructions  · Do not put pressure on any part of the cast or splint until it is fully hardened. This may take many hours.  · Return to your normal activities  as told by your doctor. Ask your doctor what activities are safe for you.  · Keep all follow-up visits as told by your doctor. This is important.  Contact a doctor if:  · Your cast or splint gets damaged.  · The skin around the cast gets red or raw.  · The skin under the cast is very itchy or painful.  · Your cast or splint feels very uncomfortable.  · Your cast or splint is too tight or too loose.  · Your cast becomes wet or it starts to have a soft spot or area.  · You get an object stuck under your cast.  Get help right away if:  · Your pain gets worse.  · The injured area tingles, gets numb, or turns blue and cold.  · The part of your body above or below the cast is swollen and it turns a different color (is discolored).  · You cannot feel or move your fingers or toes.  · There is fluid leaking through the cast.  · You have very bad pain or pressure under the cast.  · You have trouble breathing.  · You have shortness of breath.  · You have chest pain.  This information is not intended to replace advice given to you by your health care provider. Make sure you discuss any questions you have with your health care provider    Diet: Diet Order Diet: Consistent CHO (Diabetic)  Activity: As tolerated  Follow Up: Dr Ochoa in one week - you may remove your dressings in 5 days  Disposition:Home with outpatient physical therapy   Diagnosis: Closed fracture of left lower extremity    Follow up with your Primary Care Provider Lula Heath M.D. as scheduled or sooner if your symptoms persist or worsen.  Return to Emergency Room for severe chest pain, shortness of breath, signs of a stroke, or any other emergencies.    Discharge Instructions    Discharged to home by car with relative. Discharged via wheelchair, hospital escort: Yes.  Special equipment needed: Walker    Be sure to schedule a follow-up appointment with your primary care doctor or any specialists as instructed.     Discharge Plan:   Diet Plan:  Discussed  Activity Level: Discussed  Confirmed Follow up Appointment: Patient to Call and Schedule Appointment  Confirmed Symptoms Management: Discussed  Medication Reconciliation Updated: No (Comments)    I understand that a diet low in cholesterol, fat, and sodium is recommended for good health. Unless I have been given specific instructions below for another diet, I accept this instruction as my diet prescription.   Other diet: Diabetic    Special Instructions: Discharge instructions for the Orthopedic Patient    Follow up with Primary Care Physician within 2 weeks of discharge to home, regarding:  Review of medications and diagnostic testing.  Surveillance for medical complications.  Workup and treatment of osteoporosis, if appropriate.     -Is this a Hip/Knee/Shoulder Joint Replacement patient? No    -Is this patient being discharged with medication to prevent blood clots?  Yes, Aspirin Aspirin, ASA oral tablets  What is this medicine?  ASPIRIN (AS pir in) is a pain reliever. It is used to treat mild pain and fever. This medicine is also used as directed by a doctor to prevent and to treat heart attacks, to prevent strokes and blood clots, and to treat arthritis or inflammation.  This medicine may be used for other purposes; ask your health care provider or pharmacist if you have questions.  COMMON BRAND NAME(S): Aspir-Low, Aspir-Avis, Aspirtab, Thania Advanced Aspirin, Thania Aspirin, Thania Aspirin Extra Strength, Thania Aspirin Plus, Thania Extra Strength, Thania Extra Strength Plus, Thania Genuine Aspirin, Thania Womens Aspirin, Bufferin, Bufferin Extra Strength, Bufferin Low Dose  What should I tell my health care provider before I take this medicine?  They need to know if you have any of these conditions:  · anemia  · asthma  · bleeding problems  · child with chickenpox, the flu, or other viral infection  · diabetes  · gout  · if you frequently drink alcohol containing drinks  · kidney disease  · liver  disease  · low level of vitamin K  · lupus  · smoke tobacco  · stomach ulcers or other problems  · an unusual or allergic reaction to aspirin, tartrazine dye, other medicines, dyes, or preservatives  · pregnant or trying to get pregnant  · breast-feeding  How should I use this medicine?  Take this medicine by mouth with a glass of water. Follow the directions on the package or prescription label. You can take this medicine with or without food. If it upsets your stomach, take it with food. Do not take your medicine more often than directed.  Talk to your pediatrician regarding the use of this medicine in children. While this drug may be prescribed for children as young as 12 years of age for selected conditions, precautions do apply. Children and teenagers should not use this medicine to treat chicken pox or flu symptoms unless directed by a doctor.  Patients over 65 years old may have a stronger reaction and need a smaller dose.  Overdosage: If you think you have taken too much of this medicine contact a poison control center or emergency room at once.  NOTE: This medicine is only for you. Do not share this medicine with others.  What if I miss a dose?  If you are taking this medicine on a regular schedule and miss a dose, take it as soon as you can. If it is almost time for your next dose, take only that dose. Do not take double or extra doses.  What may interact with this medicine?  Do not take this medicine with any of the following medications:  · cidofovir  · ketorolac  · probenecid  This medicine may also interact with the following medications:  · alcohol  · alendronate  · bismuth subsalicylate  · flavocoxid  · herbal supplements like feverfew, garlic, kori, ginkgo biloba, horse chestnut  · medicines for diabetes or glaucoma like acetazolamide, methazolamide  · medicines for gout  · medicines that treat or prevent blood clots like enoxaparin, heparin, ticlopidine, warfarin  · other aspirin and aspirin-like  medicines  · NSAIDs, medicines for pain and inflammation, like ibuprofen or naproxen  · pemetrexed  · sulfinpyrazone  · varicella live vaccine  This list may not describe all possible interactions. Give your health care provider a list of all the medicines, herbs, non-prescription drugs, or dietary supplements you use. Also tell them if you smoke, drink alcohol, or use illegal drugs. Some items may interact with your medicine.  What should I watch for while using this medicine?  If you are treating yourself for pain, tell your doctor or health care professional if the pain lasts more than 10 days, if it gets worse, or if there is a new or different kind of pain. Tell your doctor if you see redness or swelling. Also, check with your doctor if you have a fever that lasts for more than 3 days. Only take this medicine to prevent heart attacks or blood clotting if prescribed by your doctor or health care professional.  Do not take aspirin or aspirin-like medicines with this medicine. Too much aspirin can be dangerous. Always read the labels carefully.  This medicine can irritate your stomach or cause bleeding problems. Do not smoke cigarettes or drink alcohol while taking this medicine. Do not lie down for 30 minutes after taking this medicine to prevent irritation to your throat.  If you are scheduled for any medical or dental procedure, tell your healthcare provider that you are taking this medicine. You may need to stop taking this medicine before the procedure.  This medicine may be used to treat migraines. If you take migraine medicines for 10 or more days a month, your migraines may get worse. Keep a diary of headache days and medicine use. Contact your healthcare professional if your migraine attacks occur more frequently.  What side effects may I notice from receiving this medicine?  Side effects that you should report to your doctor or health care professional as soon as possible:  · allergic reactions like skin  rash, itching or hives, swelling of the face, lips, or tongue  · breathing problems  · changes in hearing, ringing in the ears  · confusion  · general ill feeling or flu-like symptoms  · pain on swallowing  · redness, blistering, peeling or loosening of the skin, including inside the mouth or nose  · signs and symptoms of bleeding such as bloody or black, tarry stools; red or dark-brown urine; spitting up blood or brown material that looks like coffee grounds; red spots on the skin; unusual bruising or bleeding from the eye, gums, or nose  · trouble passing urine or change in the amount of urine  · unusually weak or tired  · yellowing of the eyes or skin  Side effects that usually do not require medical attention (report to your doctor or health care professional if they continue or are bothersome):  · diarrhea or constipation  · headache  · nausea, vomiting  · stomach gas, heartburn  This list may not describe all possible side effects. Call your doctor for medical advice about side effects. You may report side effects to FDA at 2-935-YHA-4415.  Where should I keep my medicine?  Keep out of the reach of children.  Store at room temperature between 15 and 30 degrees C (59 and 86 degrees F). Protect from heat and moisture. Do not use this medicine if it has a strong vinegar smell. Throw away any unused medicine after the expiration date.  NOTE: This sheet is a summary. It may not cover all possible information. If you have questions about this medicine, talk to your doctor, pharmacist, or health care provider.  © 2020 [employer]vier/Gold Standard (2018-01-30 10:42:13)      · Is patient discharged No on Warfarin / Coumadin?       Depression / Suicide Risk    As you are discharged from this [facility] facility, it is important to learn how to keep safe from harming yourself.    Recognize the warning signs:  · Abrupt changes in personality, positive or negative- including increase in energy   · Giving away  possessions  · Change in eating patterns- significant weight changes-  positive or negative  · Change in sleeping patterns- unable to sleep or sleeping all the time   · Unwillingness or inability to communicate  · Depression  · Unusual sadness, discouragement and loneliness  · Talk of wanting to die  · Neglect of personal appearance   · Rebelliousness- reckless behavior  · Withdrawal from people/activities they love  · Confusion- inability to concentrate     If you or a loved one observes any of these behaviors or has concerns about self-harm, here's what you can do:  · Talk about it- your feelings and reasons for harming yourself  · Remove any means that you might use to hurt yourself (examples: pills, rope, extension cords, firearm)  · Get professional help from the community (Mental Health, Substance Abuse, psychological counseling)  · Do not be alone:Call your Safe Contact- someone whom you trust who will be there for you.  · Call your local CRISIS HOTLINE 014-8121 or 215-676-5620  · Call your local Children's Mobile Crisis Response Team [place] (541) 174-1812 or www.AdReady  · Call the toll free National Suicide Prevention Hotlines   · National Suicide Prevention Lifeline 557-007-HDFP (8111)  · National Hope Line Network 800-SUICIDE (494-2836)

## 2021-07-26 NOTE — DISCHARGE SUMMARY
Discharge Summary    CHIEF COMPLAINT ON ADMISSION  Chief Complaint   Patient presents with   • Leg Deformity     left tib-fib injury       Reason for Admission  L Leg pain/redness     Admission Date  7/25/2021    CODE STATUS  Full Code    HPI & HOSPITAL COURSE  This is a 55 y.o. female here with a slip and fall with resultant mid to distal tibial and fibular diaphyses fracture.  She underwent IMN roxy with Dr Ochoa and has done well post operatively - she will continue ASA upon discharge and has elected to do outpatient physical therapy, and we will provide her with a walker.  She is to follow up with Ortho in a week.      Therefore, she is discharged in good and stable condition to home with organized home healthcare and close outpatient follow-up.    The patient recovered much more quickly than anticipated on admission.    Discharge Date  7/26/21    FOLLOW UP ITEMS POST DISCHARGE  None    DISCHARGE DIAGNOSES  Principal Problem:    Closed fracture of left lower extremity POA: Unknown  Active Problems:    Acquired hypothyroidism POA: Yes      Overview: 5/22/17 TSH 5.070, free T4 1.23      Levothyroxine 75mcg qd    Type 2 diabetes mellitus without complication, without long-term current use of insulin (HCC) POA: Yes  Resolved Problems:    * No resolved hospital problems. *      FOLLOW UP  No future appointments.  Charly Ochoa M.D.  [address] [facility] [place] [address]  [facility] [id number] [phone]    In 1 week      Lula Heath M.D.  [address] [facility] [address]  [facility] [facility] [id number] [phone]      As needed      MEDICATIONS ON DISCHARGE     Medication List      START taking these medications      Instructions   aspirin  MG Tbec  Start taking on: July 27, 2021  Commonly known as: ECOTRIN   Take 1 tablet by mouth every day.  Dose: 325 mg     ondansetron 4 MG Tbdp  Commonly known as: ZOFRAN ODT   Take 1 tablet by mouth every four hours as needed for Nausea (give PO if no IV route available).  Dose: 4  mg     oxyCODONE immediate-release 5 MG Tabs  Commonly known as: ROXICODONE   Take 0.5 Tablets by mouth every four hours as needed for Severe Pain for up to 7 days.  Dose: 2.5 mg        CONTINUE taking these medications      Instructions   D3 PO   Take 1 capsule by mouth every day.  Dose: 1 capsule     levothyroxine 75 MCG Tabs  Commonly known as: SYNTHROID   Take 1 Tab by mouth Every morning on an empty stomach.  Dose: 75 mcg            Allergies  Allergies   Allergen Reactions   • Pcn [Penicillins] Shortness of Breath and Rash     Rash, SOB, tachycardia       DIET  Orders Placed This Encounter   Procedures   • Diet Order Diet: Consistent CHO (Diabetic)     Standing Status:   Standing     Number of Occurrences:   1     Order Specific Question:   Diet:     Answer:   Consistent CHO (Diabetic) [4]       ACTIVITY  As tolerated.  Weight bearing as tolerated    CONSULTATIONS  Dr Ochoa     PROCEDURES  L tibial shaft IMN    LABORATORY  Lab Results   Component Value Date    SODIUM 135 07/26/2021    POTASSIUM 4.2 07/26/2021    CHLORIDE 103 07/26/2021    CO2 23 07/26/2021    GLUCOSE 138 (H) 07/26/2021    BUN 16 07/26/2021    CREATININE 0.56 07/26/2021        Lab Results   Component Value Date    WBC 10.9 (H) 07/26/2021    HEMOGLOBIN 11.3 (L) 07/26/2021    HEMATOCRIT 34.9 (L) 07/26/2021    PLATELETCT 216 07/26/2021        Total time of the discharge process exceeds 26 minutes.

## 2021-07-26 NOTE — CARE PLAN
Problem: Pain - Standard  Goal: Alleviation of pain or a reduction in pain to the patient’s comfort goal  Outcome: Progressing  Note: Making sure pain is controlled to a tolerable level conducive to ambulation in bed. Assessing pain level while rounding, and medicating prn as ordered by MD.       Problem: Fall Risk  Goal: Patient will remain free from falls  Outcome: Progressing  Note: Bed in low position, treaded slipper socks on, and call light in reach. Pt instructed to call nurse if any needs

## 2021-07-26 NOTE — THERAPY
Physical Therapy   Initial Evaluation     Patient Name: Cecilia Range  Age:  55 y.o., Sex:  female  Medical Record #: [id number]  Today's Date: 7/26/2021     Precautions: (P) Weight Bearing As Tolerated Left Lower Extremity    Assessment  Patient is 55 y.o. female who was recently admitted for GLF and presented with tibial/fibular diaphyses fracture. Pt is now s/p IMN of L LE and is to be WBAT for L LE. Pt was agreeable to therapy evaluation and required gait training, AD education/use, and encouragement to begin ROM and ankle pumps for L LE. Pt was primarily fearful of mobilization of L LE and demonstrated with NWB mechanics during ambulation. Pt provided with education and demo to encourage WB on L LE as tolerated and pt was able to improve mechanics. Pt educated on supine therapeutic exercises, elevation, icing, and positioning to dec swelling and increase healing time. Pt is in no acute skilled PT needs at this time, anticipate pt to d/c home once medically clear with recs for FWW use upon d/c to home and OP therapy.     Plan    Patient will not be actively followed for physical therapy services at this time, however may be seen if requested by physician for 1 more visit within 30 days to address any discharge or equipment needs    DC Equipment Recommendations: (P) Front-Wheel Walker  Discharge Recommendations: (P) Recommend outpatient physical therapy services to address higher level deficits     Objective       07/26/21 0830   Initial Contact Note    Initial Contact Note Order Received and Verified, Physical Therapy Evaluation in Progress with Full Report to Follow.   Precautions   Precautions Weight Bearing As Tolerated Left Lower Extremity   Pain 0 - 10 Group   Location Leg   Location Orientation Left   Description Aching   Therapist Pain Assessment Prior to Activity;During Activity;Post Activity;Nurse Notified;7   Prior Living Situation   Housing / Facility 2 Story House  (plans to stay on first floor)   Steps  Into Home 0   Steps In Home   (flight of stairs )   Bathroom Set up Bathtub / Shower Combination;Walk In Shower   Equipment Owned None   Lives with - Patient's Self Care Capacity Spouse   Comments spouse will be able to assist upon d/c to home   Prior Level of Functional Mobility   Bed Mobility Independent   Transfer Status Independent   Ambulation Independent   Distance Ambulation (Feet)   (community )   Assistive Devices Used None   Stairs Independent   History of Falls   History of Falls Yes   Date of Last Fall   (recent admission for fall while exercises )   Cognition    Cognition / Consciousness WDL   Comments pleasant/cooperative; fearful of pain and WB on L LE   Passive ROM Lower Body   Passive ROM Lower Body X   Comments limited in L knee and ankle due to pain; encouraged ROM to promote blood flow and dec stiffness   Active ROM Lower Body    Active ROM Lower Body  X   Comments limited due to pain and fearful of movement    Strength Lower Body   Lower Body Strength  X   Comments limited in L LE due to pain; able to demo functional strength in BLE   Sensation Lower Body   Lower Extremity Sensation   WDL   Lower Body Muscle Tone   Lower Body Muscle Tone  WDL   Strength Upper Body   Upper Body Strength  WDL   Upper Body Muscle Tone   Upper Body Muscle Tone  WDL   Neurological Concerns   Neurological Concerns No   Coordination Lower Body    Coordination Lower Body  WDL   Balance Assessment   Sitting Balance (Static) Good   Sitting Balance (Dynamic) Fair +   Standing Balance (Static) Fair +   Standing Balance (Dynamic) Fair +   Weight Shift Sitting Good   Weight Shift Standing Fair   Comments w/fww    Gait Analysis   Gait Level Of Assist Supervised   Assistive Device Front Wheel Walker   Distance (Feet) 150   # of Times Distance was Traveled 1   Deviation Antalgic;Step To;Decreased Heel Strike   Weight Bearing Status WBAT L LE   Comments intially demonstrated with NWB mechanics and fear of WB on L LE, however,  with encouragement and demo pt was able to WB on L LE; primarily limited by pain    Bed Mobility    Supine to Sit Supervised   Sit to Supine Minimal Assist   Scooting Supervised   Comments HOB elevated and rails up; anticipate pt to be able to get in/out of falt bed, will have spouse to to assist with L LE   Functional Mobility   Sit to Stand Supervised   Bed, Chair, Wheelchair Transfer Supervised   Toilet Transfers Supervised   Transfer Method Stand Step   Mobility EOB, sit<>stand, ambulation, transfer to toilet, back to bed    Comments cues for handplacement and FWW placement during transfers    How much difficulty does the patient currently have...   Turning over in bed (including adjusting bedclothes, sheets and blankets)? 4   Sitting down on and standing up from a chair with arms (e.g., wheelchair, bedside commode, etc.) 4   Moving from lying on back to sitting on the side of the bed? 4   How much help from another person does the patient currently need...   Moving to and from a bed to a chair (including a wheelchair)? 4   Need to walk in a hospital room? 3   Climbing 3-5 steps with a railing? 3   6 clicks Mobility Score 22   Activity Tolerance   Sitting in Chair 5 mins toilet    Sitting Edge of Bed 5 mins   Standing 7 mins   Comments limited by pain and fear of pain    Edema / Skin Assessment   Edema / Skin  Not Assessed   Education Group   Education Provided Role of Physical Therapist;Gait Training;Use of Assistive Device   Role of Physical Therapist Patient Response Patient;Acceptance;Explanation;Demonstration;Verbal Demonstration;Action Demonstration   Gait Training Patient Response Patient;Acceptance;Explanation;Demonstration;Action Demonstration;Verbal Demonstration;Reinforcement Needed   Use of Assistive Device Patient Response Patient;Acceptance;Explanation;Demonstration;Action Demonstration;Reinforcement Needed;Verbal Demonstration   Anticipated Discharge Equipment and Recommendations   DC Equipment  Recommendations Front-Wheel Walker   Discharge Recommendations Recommend outpatient physical therapy services to address higher level deficits   Interdisciplinary Plan of Care Collaboration   IDT Collaboration with  Nursing   Patient Position at End of Therapy In Bed;Call Light within Reach;Tray Table within Reach;Phone within Reach   Collaboration Comments aware of visit and recs    Session Information   Date / Session Number  7/26 d/c needs only    Priority 0

## 2021-07-26 NOTE — DISCHARGE PLANNING
Received Choice form at [phone]  Agency/Facility Name: [facility]  Referral sent per Choice form @ [phone]

## 2021-07-26 NOTE — PROGRESS NOTES
"Progress Note     Interval changes:improved. Feels well     ROS - Patient denies any new issues. No chest pain, dyspnea, or fever.  Pain well controlled.   /52   Pulse (!) 56   Temp 36.3 °C (97.4 °F) (Oral)   Resp 15   Ht 1.575 m (5' 2\")   Wt 48.1 kg (106 lb)   SpO2 98%   BMI 19.39 kg/m²      Patient seen and examined  No acute distress  Breathing non labored  RRR  Left leg: dressing c/d/i  +EHL/FHL/TA  SILT m/l/1st dws  wwp toes  Compartments soft  Recent Labs     07/25/21  0602 07/26/21  0432   WBC 6.4 10.9*   RBC 4.44 3.77*   HEMOGLOBIN 13.2 11.3*   HEMATOCRIT 41.4 34.9*   MCV 93.2 92.6   MCH 29.7 30.0   RDW 48.0 48.2   PLATELETCT 265 216   MPV 10.0 10.0   NEUTSPOLYS 45.80 80.50*   LYMPHOCYTES 41.50* 9.70*   MONOCYTES 7.70 9.20   EOSINOPHILS 4.20 0.00   BASOPHILS 0.50 0.20     Recent Labs     07/25/21  0602 07/26/21  0432   SODIUM 141 135   POTASSIUM 3.8 4.2   CHLORIDE 104 103   CO2 23 23   GLUCOSE 134* 138*   BUN 28* 16        A/P: POD #1 left tibial shaft IMN  Pain control  PT/OT  OOB as tolerated  Weight bearing as tolerated  Complete three doses IV antibiotics post op, then can discharge  Follow up with Dr. Ochoa in 2 weeks at [facility]  Dressing removed in 5 days.        Patient Active Problem List   Diagnosis   • Acquired hypothyroidism   • Nevus   • Dyslipidemia   • Inverted nipple   • Other neutropenia (HCC)   • Type 2 diabetes mellitus without complication, without long-term current use of insulin (HCC)   • Vitamin D deficiency   • Closed fracture of left lower extremity     "

## 2021-07-26 NOTE — PROGRESS NOTES
Discharging patient home per MD order. Pt demonstrated understanding of discharge instructions, follow up appointments, home medications, prescriptions, and home care for surgical wound. Pt is voiding without difficulty, pain well controlled, tolerating oral medications, O2 greater than 90%. Pt verbalized understanding of discharge instructions and educational handouts and all questions answered. IVF removed,  Pt discharged off unit with hospital escort.

## 2021-07-26 NOTE — DISCHARGE PLANNING
Anticipated Discharge Disposition: Home with Outpatient PT and DME- walker     Action: LSW met with pt at bedside to discuss d/c planning. HH order and F2F in place. PT recommending outpatient PT and OT no further needs. Pt stated she would like to do outpatient PT. LSW to provide list of outpatient therapy clinics.     Pt provided signature for referral to be placed to [facility] to have walker delivered to bedside.     Pt has transportation available upon d/c     Barriers to Discharge: None     Plan: LSW to assist as needed       Addendum 1205  LSW faxed DME CHOICE for walker to [facility].     LSW provided update to Dr. Lopez.   LSW informed charge RNJenni that it is okay to get front-wheel-walker from storage for pt.     Addendum 1216  LSW provided pt a list of outpatient therapy clinics.

## 2021-07-26 NOTE — PROGRESS NOTES
Spoke to Dr. Park regarding heparin 5000 units that is due post surgery.  Okay to hold now and tomorrow morning.      Patient walked to bathroom using FWW.  Did well.  Patient urinated post surgery.  Patient tolerated walking back to bed using FWW, not putting any weight on affected leg.      Also took patient off 2 liters O2, doing well on room air at 95%.

## 2021-07-26 NOTE — CARE PLAN
The patient is Stable - Low risk of patient condition declining or worsening    Shift Goals  Clinical Goals: Patient will be free from falls.  Patient Goals: Patient will be able to urinate post surgery.    Progress made toward(s) clinical / shift goals:  Patient uses call light appropriately.  I assisted patient to bathroom twice this evening.  She did well using FWW.  No problems with urinating.      Patient is not progressing towards the following goals:      Problem: Pain - Standard  Goal: Alleviation of pain or a reduction in pain to the patient’s comfort goal  Outcome: Progressing     Problem: Fall Risk  Goal: Patient will remain free from falls  Outcome: Progressing

## 2021-07-26 NOTE — THERAPY
Occupational Therapy   Initial Evaluation     Patient Name: Cecilia Range  Age:  55 y.o., Sex:  female  Medical Record #: [id number]  Today's Date: 7/26/2021     Precautions  Precautions: Weight Bearing As Tolerated Left Lower Extremity    Assessment    Patient is 55 y.o. female with a diagnosis of closed L tib/fib fx s/p GLF on 7/25. She is POD #1 IMN of L tibia, WBAT. Additional factors influencing patient status / progress: HTN, DM 2. Pt lives with , and was I with all ADLs/IADLs, ambulates without AD, and drives at PLOF. She is very active and works FT, requiring long periods of standing. Pt was provided education re: post op care, dressing techniques, and use of appropriate DMEs/AD upon dc home. Pt states  will assist PRN for ADLs. She did present closely at baseline, was able to perform seated and standing ADLs at spv level, though required cues and further explanation to bear weight on L LE, as pt is anxious and fearful to. She has no acute OT needs at this time.    Plan    Recommend Occupational Therapy for Evaluation only .    DC Equipment Recommendations: None  Discharge Recommendations: Anticipate that the patient will have no further occupational therapy needs after discharge from the hospital     Objective       07/26/21 0977   Prior Living Situation   Prior Services None   Housing / Facility 1 Story Apartment / Condo   Steps Into Home 0   Steps In Home 0   Bathroom Set up Bathtub / Shower Combination;Shower Curtain;Grab Bars   Equipment Owned None   Lives with - Patient's Self Care Capacity Spouse   Comments pt lives with supportive , Kota, who also works fulltime.   Prior Level of ADL Function   Self Feeding Independent   Grooming / Hygiene Independent   Bathing Independent   Dressing Independent   Toileting Independent   Prior Level of IADL Function   Medication Management Independent   Laundry Independent   Kitchen Mobility Independent   Finances Independent   Home Management  Independent   Shopping Independent   Prior Level Of Mobility Independent Without Device in Home;Independent Without Device in Community   Driving / Transportation Driving Independent   Occupation (Pre-Hospital Vocational) Employed Full Time   History of Falls   History of Falls Yes   Date of Last Fall 07/25/21   Precautions   Precautions Weight Bearing As Tolerated Left Lower Extremity   Pain   Pain Scales 0 to 10 Scale    Intervention [place];Cold Pack;Rest   Pain 0 - 10 Group   Location Leg   Location Orientation Left   Pain Rating Scale (NPRS) 5   Description Aching   Comfort Goal Comfort with Movement   Therapist Pain Assessment Post Activity Pain Same as Prior to Activity   Cognition    Cognition / Consciousness WDL   Comments A+Ox4, pleasant and cooperative   Active ROM Upper Body   Active ROM Upper Body  WDL   Dominant Hand Right   Strength Upper Body   Upper Body Strength  WDL   Sensation Upper Body   Upper Extremity Sensation  WDL   Upper Body Muscle Tone   Upper Body Muscle Tone  WDL   Balance Assessment   Sitting Balance (Static) Good   Sitting Balance (Dynamic) Fair +   Standing Balance (Static) Fair +   Standing Balance (Dynamic) Fair +   Weight Shift Sitting Good   Weight Shift Standing Fair   Comments with FWW   Bed Mobility    Supine to Sit Supervised   Sit to Supine Supervised   Scooting Supervised   Comments HOB and FOB elevated, rails not utilized   ADL Assessment   Grooming Standing;Supervision   Upper Body Dressing Supervision   Lower Body Dressing Supervision   How much help from another person does the patient currently need...   Putting on and taking off regular lower body clothing? 3   Bathing (including washing, rinsing, and drying)? 3   Toileting, which includes using a toilet, bedpan, or urinal? 3   Putting on and taking off regular upper body clothing? 3   Taking care of personal grooming such as brushing teeth? 3   Eating meals? 4   6 Clicks Daily Activity Score 19   Functional Mobility    Sit to Stand Supervised   Bed, Chair, Wheelchair Transfer Supervised   Toilet Transfers Supervised   Transfer Method Stand Step   Mobility in room with FWW   Distance (Feet) 10   # of Times Distance was Traveled 2   Comments encouraged WBAT on L LE   Activity Tolerance   Sitting in Chair NT   Sitting Edge of Bed 5 mins   Standing 7 mins   Comments limited by anxiety with WB and pain   Education Group   Education Provided Role of Occupational Therapist;Home Safety;Transfers;Weight Bearing Precautions   Role of Occupational Therapist Patient Response Patient;Acceptance;Explanation   Home Safety Patient Response Patient;Acceptance;Explanation;Verbal Demonstration   Transfers Patient Response Patient;Acceptance;Explanation;Verbal Demonstration;Reinforcement Needed   Weight Bearing Precautions Patient Response Patient;Acceptance;Explanation;Verbal Demonstration;Reinforcement Needed   Additional Comments requires encouragement to bear weight on L LE   Anticipated Discharge Equipment and Recommendations   DC Equipment Recommendations None   Discharge Recommendations Anticipate that the patient will have no further occupational therapy needs after discharge from the hospital   Interdisciplinary Plan of Care Collaboration   IDT Collaboration with  Nursing;Physical Therapist   Patient Position at End of Therapy In Bed;Call Light within Reach;Phone within Reach;Tray Table within Reach   Collaboration Comments RN aware of OT session and dc recs   Session Information   Date / Session Number  7/26 #1 (one time only)   Priority 0

## 2021-07-26 NOTE — CARE PLAN
The patient is Stable - Low risk of patient condition declining or worsening    Shift Goals  Clinical Goals: manage pain  Patient Goals: rest    Progress made toward(s) clinical / shift goals:  Pt is educated to call for pain medication when needed. Ice pack applied.            IS and deep breathing exercises completed.    Patient is not progressing towards the following goals:      Problem: Fall Risk  Goal: Patient will remain free from falls  Outcome: Progressing     Problem: Pain - Post Surgery  Goal: Alleviation or reduction of pain post surgery  Outcome: Progressing     Problem: Incision Care  Goal: Optimal post surgical incision care  Outcome: Progressing

## 2021-07-26 NOTE — PROGRESS NOTES
Received bedside report. Assumed pt care. Assessment and chart check complete. Pt is AAOX4, no signs of distress, denies N/V and pain at this moment. Dressing at LLE CDI, with dorsi flex and plantar flex, +2 pulses. Ice pack and SCD's in placed. Educated for IS and deep breathing exercises. Fall precautions in place, treaded socks on pt, bed in low position. Call light within reach. Educated pt to call if needing anything.

## 2021-08-02 ENCOUNTER — OFFICE VISIT (OUTPATIENT)
Dept: MEDICAL GROUP | Facility: LAB | Age: 56
End: 2021-08-02
Payer: COMMERCIAL

## 2021-08-02 VITALS
DIASTOLIC BLOOD PRESSURE: 60 MMHG | OXYGEN SATURATION: 99 % | TEMPERATURE: 99.3 F | SYSTOLIC BLOOD PRESSURE: 104 MMHG | HEIGHT: 62 IN | HEART RATE: 80 BPM | BODY MASS INDEX: 19.69 KG/M2 | RESPIRATION RATE: 16 BRPM | WEIGHT: 107 LBS

## 2021-08-02 DIAGNOSIS — Z98.890 S/P ORIF (OPEN REDUCTION INTERNAL FIXATION) FRACTURE: ICD-10-CM

## 2021-08-02 DIAGNOSIS — Z78.0 POSTMENOPAUSAL: ICD-10-CM

## 2021-08-02 DIAGNOSIS — S82.202D CLOSED FRACTURE OF LEFT TIBIA AND FIBULA WITH ROUTINE HEALING: ICD-10-CM

## 2021-08-02 DIAGNOSIS — Z87.81 S/P ORIF (OPEN REDUCTION INTERNAL FIXATION) FRACTURE: ICD-10-CM

## 2021-08-02 DIAGNOSIS — M79.89 LEG SWELLING: ICD-10-CM

## 2021-08-02 DIAGNOSIS — S82.402D CLOSED FRACTURE OF LEFT TIBIA AND FIBULA WITH ROUTINE HEALING: ICD-10-CM

## 2021-08-02 PROCEDURE — 99214 OFFICE O/P EST MOD 30 MIN: CPT | Performed by: FAMILY MEDICINE

## 2021-08-02 ASSESSMENT — FIBROSIS 4 INDEX: FIB4 SCORE: 1.33

## 2021-08-02 NOTE — PATIENT INSTRUCTIONS
Deep Vein Thrombosis    Deep vein thrombosis (DVT) is a condition in which a blood clot forms in a deep vein, such as a lower leg, thigh, or arm vein. A clot is blood that has thickened into a gel or solid. This condition is dangerous. It can lead to serious and even life-threatening complications if the clot travels to the lungs and causes a blockage (pulmonary embolism). It can also damage veins in the leg. This can result in leg pain, swelling, discoloration, and sores (post-thrombotic syndrome).  What are the causes?  This condition may be caused by:  · A slowdown of blood flow.  · Damage to a vein.  · A condition that causes blood to clot more easily, such as an inherited clotting disorder.  What increases the risk?  The following factors may make you more likely to develop this condition:  · Being overweight.  · Being older, especially over age 60.  · Sitting or lying down for more than four hours.  · Being in the hospital.  · Lack of physical activity (sedentary lifestyle).  · Pregnancy, being in childbirth, or having recently given birth.  · Taking medicines that contain estrogen, such as medicines to prevent pregnancy.  · Smoking.  · A history of any of the following:  ? Blood clots or a blood clotting disease.  ? Peripheral vascular disease.  ? Inflammatory bowel disease.  ? Cancer.  ? Heart disease.  ? Genetic conditions that affect how your blood clots, such as Factor V Leiden mutation.  ? Neurological diseases that affect your legs (leg paresis).  ? A recent injury, such as a car accident.  ? Major or lengthy surgery.  ? A central line placed inside a large vein.  What are the signs or symptoms?  Symptoms of this condition include:  · Swelling, pain, or tenderness in an arm or leg.  · Warmth, redness, or discoloration in an arm or leg.  If the clot is in your leg, symptoms may be more noticeable or worse when you stand or walk. Some people may not develop any symptoms.  How is this diagnosed?  This  condition is diagnosed with:  · A medical history and physical exam.  · Tests, such as:  ? Blood tests. These are done to check how well your blood clots.  ? Ultrasound. This is done to check for clots.  ? Venogram. For this test, contrast dye is injected into a vein and X-rays are taken to check for any clots.  How is this treated?  Treatment for this condition depends on:  · The cause of your DVT.  · Your risk for bleeding or developing more clots.  · Any other medical conditions that you have.  Treatment may include:  · Taking a blood thinner (anticoagulant). This type of medicine prevents clots from forming. It may be taken by mouth, injected under the skin, or injected through an IV (catheter).  · Injecting clot-dissolving medicines into the affected vein (catheter-directed thrombolysis).  · Having surgery. Surgery may be done to:  ? Remove the clot.  ? Place a filter in a large vein to catch blood clots before they reach the lungs.  Some treatments may be continued for up to six months.  Follow these instructions at home:  If you are taking blood thinners:  · Take the medicine exactly as told by your health care provider. Some blood thinners need to be taken at the same time every day. Do not skip a dose.  · Talk with your health care provider before you take any medicines that contain aspirin or NSAIDs. These medicines increase your risk for dangerous bleeding.  · Ask your health care provider about foods and drugs that could change the way the medicine works (may interact). Avoid those things if your health care provider tells you to do so.  · Blood thinners can cause easy bruising and may make it difficult to stop bleeding. Because of this:  ? Be very careful when using knives, scissors, or other sharp objects.  ? Use an electric razor instead of a blade.  ? Avoid activities that could cause injury or bruising, and follow instructions about how to prevent falls.  · Wear a medical alert bracelet or carry a  card that lists what medicines you take.  General instructions  · Take over-the-counter and prescription medicines only as told by your health care provider.  · Return to your normal activities as told by your health care provider. Ask your health care provider what activities are safe for you.  · Wear compression stockings if recommended by your health care provider.  · Keep all follow-up visits as told by your health care provider. This is important.  How is this prevented?  To lower your risk of developing this condition again:  · For 30 or more minutes every day, do an activity that:  ? Involves moving your arms and legs.  ? Increases your heart rate.  · When traveling for longer than four hours:  ? Exercise your arms and legs every hour.  ? Drink plenty of water.  ? Avoid drinking alcohol.  · Avoid sitting or lying for a long time without moving your legs.  · If you have surgery or you are hospitalized, ask about ways to prevent blood clots. These may include taking frequent walks or using anticoagulants.  · Stay at a healthy weight.  · If you are a woman who is older than age 35, avoid unnecessary use of medicines that contain estrogen, such as some birth control pills.  · Do not use any products that contain nicotine or tobacco, such as cigarettes and e-cigarettes. This is especially important if you take estrogen medicines. If you need help quitting, ask your health care provider.  Contact a health care provider if:  · You miss a dose of your blood thinner.  · Your menstrual period is heavier than usual.  · You have unusual bruising.  Get help right away if:  · You have:  ? New or increased pain, swelling, or redness in an arm or leg.  ? Numbness or tingling in an arm or leg.  ? Shortness of breath.  ? Chest pain.  ? A rapid or irregular heartbeat.  ? A severe headache or confusion.  ? A cut that will not stop bleeding.  · There is blood in your vomit, stool, or urine.  · You have a serious fall or accident,  or you hit your head.  · You feel light-headed or dizzy.  · You cough up blood.  These symptoms may represent a serious problem that is an emergency. Do not wait to see if the symptoms will go away. Get medical help right away. Call your local emergency services (911 in the [place] Do not drive yourself to the hospital.  Summary  · Deep vein thrombosis (DVT) is a condition in which a blood clot forms in a deep vein, such as a lower leg, thigh, or arm vein.  · Symptoms can include swelling, warmth, pain, and redness in your leg or arm.  · This condition may be treated with a blood thinner (anticoagulant medicine), medicine that is injected to dissolve blood clots,compression stockings, or surgery.  · If you are prescribed blood thinners, take them exactly as told.  This information is not intended to replace advice given to you by your health care provider. Make sure you discuss any questions you have with your health care provider.  Document Released: 12/18/2006 Document Revised: 11/30/2018 Document Reviewed: 05/18/2018  [employer] Patient Education © 2020 [employer]

## 2021-08-03 ENCOUNTER — HOSPITAL ENCOUNTER (OUTPATIENT)
Dept: RADIOLOGY | Facility: MEDICAL CENTER | Age: 56
End: 2021-08-03
Attending: FAMILY MEDICINE
Payer: COMMERCIAL

## 2021-08-03 DIAGNOSIS — M79.89 LEG SWELLING: ICD-10-CM

## 2021-08-03 DIAGNOSIS — S82.402D CLOSED FRACTURE OF LEFT TIBIA AND FIBULA WITH ROUTINE HEALING: ICD-10-CM

## 2021-08-03 DIAGNOSIS — Z87.81 S/P ORIF (OPEN REDUCTION INTERNAL FIXATION) FRACTURE: ICD-10-CM

## 2021-08-03 DIAGNOSIS — S82.202D CLOSED FRACTURE OF LEFT TIBIA AND FIBULA WITH ROUTINE HEALING: ICD-10-CM

## 2021-08-03 DIAGNOSIS — Z98.890 S/P ORIF (OPEN REDUCTION INTERNAL FIXATION) FRACTURE: ICD-10-CM

## 2021-08-03 PROCEDURE — 93971 EXTREMITY STUDY: CPT | Mod: LT

## 2021-08-08 NOTE — PROGRESS NOTES
Subjective:     Chief Complaint   Patient presents with   • Hospital Follow-up     Left leg injury       Cecilia Taveras is a 55 y.o. female here today for evaluation and management of:    S/P ORIF (open reduction internal fixation) fracture  Patient slipped off of a low wooden pedestal twisting her ankle resulting in a tibia and fibula fracture on the left.  She had an ORIF on 7/25/2021 and today is having more swelling up to her calf and more pain on that side.  She is non weight bearing.  She denies any fever or chills.  No cough or SOB       Allergies   Allergen Reactions   • Pcn [Penicillins] Shortness of Breath and Rash     Rash, SOB, tachycardia       Current medicines (including changes today)  Current Outpatient Medications   Medication Sig Dispense Refill   • aspirin EC (ECOTRIN) 325 MG Tablet Delayed Response Take 1 tablet by mouth every day. 360 tablet 0   • ondansetron (ZOFRAN ODT) 4 MG TABLET DISPERSIBLE Take 1 tablet by mouth every four hours as needed for Nausea (give PO if no IV route available). 10 tablet 0   • Cholecalciferol (D3 PO) Take 1 capsule by mouth every day.     • levothyroxine (SYNTHROID) 75 MCG Tab Take 1 Tab by mouth Every morning on an empty stomach. 90 Tab 3     No current facility-administered medications for this visit.       She  has a past medical history of Thyroid disease.    Patient Active Problem List    Diagnosis Date Noted   • Closed fracture of left tibia and fibula with routine healing 08/02/2021   • S/P ORIF (open reduction internal fixation) fracture 08/02/2021   • Closed fracture of left lower extremity 07/25/2021   • Other neutropenia (HCC) 01/06/2021   • Type 2 diabetes mellitus without complication, without long-term current use of insulin (HCC) 01/06/2021   • Vitamin D deficiency 01/06/2021   • Inverted nipple 03/14/2019   • Dyslipidemia 12/20/2018   • Nevus 06/28/2018   • Acquired hypothyroidism 12/08/2017       ROS   No fever or chills.  No nausea or vomiting.  No  "chest pain or palpitations.  No cough or SOB.  No pain with urination or hematuria.  No black or bloody stools.       Objective:     /60 (BP Location: Right arm, Patient Position: Sitting, BP Cuff Size: Adult)   Pulse 80   Temp 37.4 °C (99.3 °F) (Temporal)   Resp 16   Ht 1.575 m (5' 2\")   Wt 48.5 kg (107 lb)   SpO2 99%  Body mass index is 19.57 kg/m².   Physical Exam:  Well developed, well nourished.  Alert, oriented in no acute distress.  Eye contact is good, speech goal directed, affect calm  Eyes: conjunctiva non-injected, sclera non-icteric.  Neck Supple.  No adenopathy or masses in the neck or supraclavicular regions. No thyromegaly  Lungs: clear to auscultation bilaterally with good excursion. No wheezes or rhonchi  CV: regular rate and rhythm. No murmur  Left leg with significant swelling and calf tenderness.  Good pedal pulses and movement      Assessment and Plan:   The following treatment plan was discussed    1. Closed fracture of left tibia and fibula with routine healing  High risk for DVT.  Stat ultrasound ordered.  Elevate leg above her heart as much as possible.  Await results.  Follow up with surgeon as scheduled  - US-EXTREMITY VENOUS LOWER UNILAT LEFT; Future    2. S/P ORIF (open reduction internal fixation) fracture  High risk for DVT.  Stat ultrasound ordered.  Elevate leg above her heart as much as possible.  Await results.  Follow up with surgeon as scheduled  - US-EXTREMITY VENOUS LOWER UNILAT LEFT; Future    3. Leg swelling  High risk for DVT.  Stat ultrasound ordered.  Elevate leg above her heart as much as possible.  Await results.  Follow up with surgeon as scheduled  - US-EXTREMITY VENOUS LOWER UNILAT LEFT; Future    4. Postmenopausal  Patient has never had a bone density.  Given the minimal trauma we will screen for osteoporosis   - DS-BONE DENSITY STUDY (DEXA); Future    Any change or worsening of signs or symptoms, patient encouraged to follow-up or report to the emergency " room for further evaluation. Patient understands and agrees.    Followup: Return if symptoms worsen or fail to improve.

## 2021-08-08 NOTE — ASSESSMENT & PLAN NOTE
Patient slipped off of a low wooden pedestal twisting her ankle resulting in a tibia and fibula fracture on the left.  She had an ORIF on 7/25/2021 and today is having more swelling up to her calf and more pain on that side.  She is non weight bearing.  She denies any fever or chills.  No cough or SOB

## 2021-09-07 ENCOUNTER — HOSPITAL ENCOUNTER (OUTPATIENT)
Dept: RADIOLOGY | Facility: MEDICAL CENTER | Age: 56
End: 2021-09-07
Attending: FAMILY MEDICINE
Payer: COMMERCIAL

## 2021-09-07 DIAGNOSIS — Z78.0 POSTMENOPAUSAL: ICD-10-CM

## 2021-09-07 PROCEDURE — 77080 DXA BONE DENSITY AXIAL: CPT

## 2021-10-21 ENCOUNTER — HOSPITAL ENCOUNTER (OUTPATIENT)
Dept: LAB | Facility: MEDICAL CENTER | Age: 56
End: 2021-10-21
Attending: FAMILY MEDICINE
Payer: COMMERCIAL

## 2021-10-21 DIAGNOSIS — E11.9 TYPE 2 DIABETES MELLITUS WITHOUT COMPLICATION, WITHOUT LONG-TERM CURRENT USE OF INSULIN (HCC): ICD-10-CM

## 2021-10-21 DIAGNOSIS — E78.5 DYSLIPIDEMIA: ICD-10-CM

## 2021-10-21 LAB
CHOLEST SERPL-MCNC: 207 MG/DL (ref 100–199)
EST. AVERAGE GLUCOSE BLD GHB EST-MCNC: 117 MG/DL
FASTING STATUS PATIENT QL REPORTED: NORMAL
HBA1C MFR BLD: 5.7 % (ref 4–5.6)
HDLC SERPL-MCNC: 73 MG/DL
LDLC SERPL CALC-MCNC: 125 MG/DL
TRIGL SERPL-MCNC: 47 MG/DL (ref 0–149)

## 2021-10-21 PROCEDURE — 36415 COLL VENOUS BLD VENIPUNCTURE: CPT

## 2021-10-21 PROCEDURE — 80061 LIPID PANEL: CPT

## 2021-10-21 PROCEDURE — 83036 HEMOGLOBIN GLYCOSYLATED A1C: CPT

## 2021-10-22 ENCOUNTER — PATIENT MESSAGE (OUTPATIENT)
Dept: MEDICAL GROUP | Facility: LAB | Age: 56
End: 2021-10-22

## 2021-11-02 ENCOUNTER — OFFICE VISIT (OUTPATIENT)
Dept: MEDICAL GROUP | Facility: LAB | Age: 56
End: 2021-11-02
Payer: COMMERCIAL

## 2021-11-02 VITALS
RESPIRATION RATE: 12 BRPM | HEIGHT: 62 IN | TEMPERATURE: 98.8 F | WEIGHT: 110 LBS | HEART RATE: 72 BPM | BODY MASS INDEX: 20.24 KG/M2 | DIASTOLIC BLOOD PRESSURE: 64 MMHG | OXYGEN SATURATION: 96 % | SYSTOLIC BLOOD PRESSURE: 100 MMHG

## 2021-11-02 DIAGNOSIS — E78.5 DYSLIPIDEMIA: ICD-10-CM

## 2021-11-02 DIAGNOSIS — R73.03 PREDIABETES: ICD-10-CM

## 2021-11-02 DIAGNOSIS — L25.8 CONTACT DERMATITIS DUE TO OTHER AGENT, UNSPECIFIED CONTACT DERMATITIS TYPE: ICD-10-CM

## 2021-11-02 PROBLEM — S82.92XA CLOSED FRACTURE OF LEFT LOWER EXTREMITY: Status: RESOLVED | Noted: 2021-07-25 | Resolved: 2021-11-02

## 2021-11-02 PROBLEM — S82.202D CLOSED FRACTURE OF LEFT TIBIA AND FIBULA WITH ROUTINE HEALING: Status: RESOLVED | Noted: 2021-08-02 | Resolved: 2021-11-02

## 2021-11-02 PROBLEM — S82.402D CLOSED FRACTURE OF LEFT TIBIA AND FIBULA WITH ROUTINE HEALING: Status: RESOLVED | Noted: 2021-08-02 | Resolved: 2021-11-02

## 2021-11-02 PROCEDURE — 99214 OFFICE O/P EST MOD 30 MIN: CPT | Performed by: FAMILY MEDICINE

## 2021-11-02 RX ORDER — TRIAMCINOLONE ACETONIDE 1 MG/G
1 CREAM TOPICAL 2 TIMES DAILY
Qty: 15 G | Refills: 1 | Status: SHIPPED | OUTPATIENT
Start: 2021-11-02 | End: 2022-10-04

## 2021-11-02 ASSESSMENT — FIBROSIS 4 INDEX: FIB4 SCORE: 1.36

## 2021-11-02 NOTE — ASSESSMENT & PLAN NOTE
Results for GRACIE MELCHOR (MRN [id number]) as of 11/2/2021 10:40   Ref. Range 10/21/2021 12:37   Cholesterol,Tot Latest Ref Range: 100 - 199 mg/dL 207 (H)   Triglycerides Latest Ref Range: 0 - 149 mg/dL 47   HDL Latest Ref Range: >=40 mg/dL 73   LDL Latest Ref Range: <100 mg/dL 125 (H)   The 10-year ASCVD risk score (Dioaleah HARPER Jr., et al., 2013) is: 1.7%

## 2021-11-02 NOTE — PATIENT INSTRUCTIONS
Mediterranean Diet  A Mediterranean diet refers to food and lifestyle choices that are based on the traditions of countries located on the [place]. This way of eating has been shown to help prevent certain conditions and improve outcomes for people who have chronic diseases, like kidney disease and heart disease.  What are tips for following this plan?  Lifestyle  · Cook and eat meals together with your family, when possible.  · Drink enough fluid to keep your urine clear or pale yellow.  · Be physically active every day. This includes:  ? Aerobic exercise like running or swimming.  ? Leisure activities like gardening, walking, or housework.  · Get 7-8 hours of sleep each night.  · If recommended by your health care provider, drink red wine in moderation. This means 1 glass a day for nonpregnant women and 2 glasses a day for men. A glass of wine equals 5 oz (150 mL).  Reading food labels    · Check the serving size of packaged foods. For foods such as rice and pasta, the serving size refers to the amount of cooked product, not dry.  · Check the total fat in packaged foods. Avoid foods that have saturated fat or trans fats.  · Check the ingredients list for added sugars, such as corn syrup.  Shopping  · At the grocery store, buy most of your food from the areas near the walls of the store. This includes:  ? Fresh fruits and vegetables (produce).  ? Grains, beans, nuts, and seeds. Some of these may be available in unpackaged forms or large amounts (in bulk).  ? Fresh seafood.  ? Poultry and eggs.  ? Low-fat dairy products.  · Buy whole ingredients instead of prepackaged foods.  · Buy fresh fruits and vegetables in-season from local farmers markets.  · Buy frozen fruits and vegetables in resealable bags.  · If you do not have access to quality fresh seafood, buy precooked frozen shrimp or canned fish, such as tuna, salmon, or sardines.  · Buy small amounts of raw or cooked vegetables, salads, or olives from  the deli or salad bar at your store.  · Stock your pantry so you always have certain foods on hand, such as olive oil, canned tuna, canned tomatoes, rice, pasta, and beans.  Cooking  · Cook foods with extra-virgin olive oil instead of using butter or other vegetable oils.  · Have meat as a side dish, and have vegetables or grains as your main dish. This means having meat in small portions or adding small amounts of meat to foods like pasta or stew.  · Use beans or vegetables instead of meat in common dishes like chili or lasagna.  · [place] with different cooking methods. Try roasting or broiling vegetables instead of steaming or sautéeing them.  · Add frozen vegetables to soups, stews, pasta, or rice.  · Add nuts or seeds for added healthy fat at each meal. You can add these to yogurt, salads, or vegetable dishes.  · Marinate fish or vegetables using olive oil, lemon juice, garlic, and fresh herbs.  Meal planning    · Plan to eat 1 vegetarian meal one day each week. Try to work up to 2 vegetarian meals, if possible.  · Eat seafood 2 or more times a week.  · Have healthy snacks readily available, such as:  ? Vegetable sticks with hummus.  ? Greek yogurt.  ? Fruit and nut trail mix.  · Eat balanced meals throughout the week. This includes:  ? Fruit: 2-3 servings a day  ? Vegetables: 4-5 servings a day  ? Low-fat dairy: 2 servings a day  ? Fish, poultry, or lean meat: 1 serving a day  ? Beans and legumes: 2 or more servings a week  ? Nuts and seeds: 1-2 servings a day  ? Whole grains: 6-8 servings a day  ? Extra-virgin olive oil: 3-4 servings a day  · Limit red meat and sweets to only a few servings a month  What are my food choices?  · Mediterranean diet  ? Recommended  § Grains: Whole-grain pasta. Brown rice. Bulgar wheat. Polenta. Couscous. Whole-wheat bread. Oatmeal. Quinoa.  § Vegetables: Artichokes. Beets. Broccoli. Cabbage. Carrots. Eggplant. Green beans. Chard. Kale. Spinach. Onions. Leeks. Peas. Squash.  Tomatoes. Peppers. Radishes.  § Fruits: Apples. Apricots. Avocado. Berries. Bananas. Cherries. Dates. Figs. Grapes. [place]. Melon. Oranges. Peaches. Plums. Pomegranate.  § Meats and other protein foods: Beans. Almonds. Sunflower seeds. Pine nuts. Peanuts. Cod. [place]. Scallops. Shrimp. Tuna. Tilapia. Clams. Oysters. Eggs.  § Dairy: Low-fat milk. Cheese. Greek yogurt.  § Beverages: Water. Red wine. Herbal tea.  § Fats and oils: Extra virgin olive oil. Avocado oil. Grape seed oil.  § Sweets and desserts: Greek yogurt with honey. Baked apples. Poached pears. Trail mix.  § Seasoning and other foods: Basil. Cilantro. Coriander. Cumin. Mint. Parsley. Alex. Rosemary. Tarragon. Garlic. Oregano. Thyme. Pepper. Balsalmic vinegar. Tahini. Hummus. Tomato sauce. Olives. Mushrooms.  ? Limit these  § Grains: Prepackaged pasta or rice dishes. Prepackaged cereal with added sugar.  § Vegetables: Deep fried potatoes (french fries).  § Fruits: Fruit canned in syrup.  § Meats and other protein foods: Beef. Pork. Lamb. Poultry with skin. Hot dogs. [place].  § Dairy: Ice cream. Sour cream. Whole milk.  § Beverages: Juice. Sugar-sweetened soft drinks. Beer. Liquor and spirits.  § Fats and oils: Butter. Canola oil. Vegetable oil. Beef fat (tallow). Lard.  § Sweets and desserts: Cookies. Cakes. Pies. Candy.  § Seasoning and other foods: Mayonnaise. Premade sauces and marinades.  The items listed may not be a complete list. Talk with your dietitian about what dietary choices are right for you.  Summary  · The Mediterranean diet includes both food and lifestyle choices.  · Eat a variety of fresh fruits and vegetables, beans, nuts, seeds, and whole grains.  · Limit the amount of red meat and sweets that you eat.  · Talk with your health care provider about whether it is safe for you to drink red wine in moderation. This means 1 glass a day for nonpregnant women and 2 glasses a day for men. A glass of wine equals 5 oz (150 mL).  This information  is not intended to replace advice given to you by your health care provider. Make sure you discuss any questions you have with your health care provider.  Document Released: 08/10/2017 Document Revised: 08/17/2017 Document Reviewed: 08/10/2017  Elsevier Patient Education © 2020 [employer]    Contact Dermatitis  Dermatitis is redness, soreness, and swelling (inflammation) of the skin. Contact dermatitis is a reaction to certain substances that touch the skin. Many different substances can cause contact dermatitis. There are two types of contact dermatitis:  · Irritant contact dermatitis. This type is caused by something that irritates your skin, such as having dry hands from washing them too often with soap. This type does not require previous exposure to the substance for a reaction to occur. This is the most common type.  · Allergic contact dermatitis. This type is caused by a substance that you are allergic to, such as poison ivy. This type occurs when you have been exposed to the substance (allergen) and develop a sensitivity to it. Dermatitis may develop soon after your first exposure to the allergen, or it may not develop until the next time you are exposed and every time thereafter.  What are the causes?  Irritant contact dermatitis is most commonly caused by exposure to:  · Makeup.  · Soaps.  · Detergents.  · Bleaches.  · Acids.  · Metal salts, such as nickel.  Allergic contact dermatitis is most commonly caused by exposure to:  · Poisonous plants.  · Chemicals.  · Jewelry.  · Latex.  · Medicines.  · Preservatives in products, such as clothing.  What increases the risk?  You are more likely to develop this condition if you have:  · A job that exposes you to irritants or allergens.  · Certain medical conditions, such as asthma or eczema.  What are the signs or symptoms?  Symptoms of this condition may occur on your body anywhere the irritant has touched you or is touched by you.  · Symptoms  include:  ? Dryness or flaking.  ? Redness.  ? Cracks.  ? Itching.  ? Pain or a burning feeling.  ? Blisters.  ? Drainage of small amounts of blood or clear fluid from skin cracks.  With allergic contact dermatitis, there may also be swelling in areas such as the eyelids, mouth, or genitals.  How is this diagnosed?  This condition is diagnosed with a medical history and physical exam.  · A patch skin test may be performed to help determine the cause.  · If the condition is related to your job, you may need to see an occupational medicine specialist.  How is this treated?  This condition is treated by checking for the cause of the reaction and protecting your skin from further contact. Treatment may also include:  · Steroid creams or ointments. Oral steroid medicines may be needed in more severe cases.  · Antibiotic medicines or antibacterial ointments, if a skin infection is present.  · Antihistamine lotion or an antihistamine taken by mouth to ease itching.  · A bandage (dressing).  Follow these instructions at home:  Skin care  · Moisturize your skin as needed.  · Apply cool compresses to the affected areas.  · Try applying baking soda paste to your skin. Stir water into baking soda until it reaches a paste-like consistency.  · Do not scratch your skin, and avoid friction to the affected area.  · Avoid the use of soaps, perfumes, and dyes.  Medicines  · Take or apply over-the-counter and prescription medicines only as told by your health care provider.  · If you were prescribed an antibiotic medicine, take or apply the antibiotic as told by your health care provider. Do not stop using the antibiotic even if your condition improves.  Bathing  · Try taking a bath with:  ? Epsom salts. Follow the instructions on the packaging. You can get these at your local pharmacy or grocery store.  ? Baking soda. Pour a small amount into the bath as directed by your health care provider.  ? Colloidal oatmeal. Follow the  instructions on the packaging. You can get this at your local pharmacy or grocery store.  · Bathe less frequently, such as every other day.  · Bathe in lukewarm water. Avoid using hot water.  Bandage care  · If you were given a bandage (dressing), change it as told by your health care provider.  · Wash your hands with soap and water before and after you change your dressing. If soap and water are not available, use hand .  General instructions  · Avoid the substance that caused your reaction. If you do not know what caused it, keep a journal to try to track what caused it. Write down:  ? What you eat.  ? What cosmetic products you use.  ? What you drink.  ? What you wear in the affected area. This includes jewelry.  · Check the affected areas every day for signs of infection. Check for:  ? More redness, swelling, or pain.  ? More fluid or blood.  ? Warmth.  ? Pus or a bad smell.  · Keep all follow-up visits as told by your health care provider. This is important.  Contact a health care provider if:  · Your condition does not improve with treatment.  · Your condition gets worse.  · You have signs of infection such as swelling, tenderness, redness, soreness, or warmth in the affected area.  · You have a fever.  · You have new symptoms.  Get help right away if:  · You have a severe headache, neck pain, or neck stiffness.  · You vomit.  · You feel very sleepy.  · You notice red streaks coming from the affected area.  · Your bone or joint underneath the affected area becomes painful after the skin has healed.  · The affected area turns darker.  · You have difficulty breathing.  Summary  · Dermatitis is redness, soreness, and swelling (inflammation) of the skin. Contact dermatitis is a reaction to certain substances that touch the skin.  · Symptoms of this condition may occur on your body anywhere the irritant has touched you or is touched by you.  · This condition is treated by figuring out what caused the  reaction and protecting your skin from further contact. Treatment may also include medicines and skin care.  · Avoid the substance that caused your reaction. If you do not know what caused it, keep a journal to try to track what caused it.  · Contact a health care provider if your condition gets worse or you have signs of infection such as swelling, tenderness, redness, soreness, or warmth in the affected area.  This information is not intended to replace advice given to you by your health care provider. Make sure you discuss any questions you have with your health care provider.  Document Released: 12/15/2001 Document Revised: 04/08/2020 Document Reviewed: 07/03/2019  Elsevier Patient Education © 2020 [employer] Inc[employer]

## 2021-11-15 NOTE — PROGRESS NOTES
Subjective:     Chief Complaint   Patient presents with   • Lab Results       Gracie Taveras is a 56 y.o. female here today for evaluation and management of:    Dyslipidemia  Results for GRACIE TAVERAS (MRN [id number]) as of 11/2/2021 10:40   Ref. Range 10/21/2021 12:37   Cholesterol,Tot Latest Ref Range: 100 - 199 mg/dL 207 (H)   Triglycerides Latest Ref Range: 0 - 149 mg/dL 47   HDL Latest Ref Range: >=40 mg/dL 73   LDL Latest Ref Range: <100 mg/dL 125 (H)   The 10-year ASCVD risk score ([place] HARPER Jr., et al., 2013) is: 1.7%      Prediabetes  Results for GRACIE TAVERAS (MRN [id number]) as of 11/15/2021 13:17   Ref. Range 10/21/2021 12:37   Glycohemoglobin Latest Ref Range: 4.0 - 5.6 % 5.7 (H)   Estim. Avg Glu Latest Units: mg/dL 117        Allergies   Allergen Reactions   • Pcn [Penicillins] Shortness of Breath and Rash     Rash, SOB, tachycardia       Current medicines (including changes today)  Current Outpatient Medications   Medication Sig Dispense Refill   • triamcinolone acetonide (KENALOG) 0.1 % Cream Apply 1 Application topically 2 times a day. 15 g 1   • Cholecalciferol (D3 PO) Take 1 capsule by mouth every day.     • levothyroxine (SYNTHROID) 75 MCG Tab Take 1 Tab by mouth Every morning on an empty stomach. 90 Tab 3     No current facility-administered medications for this visit.       She  has a past medical history of Thyroid disease.    Patient Active Problem List    Diagnosis Date Noted   • S/P ORIF (open reduction internal fixation) fracture 08/02/2021   • Other neutropenia (HCC) 01/06/2021   • Prediabetes 01/06/2021   • Vitamin D deficiency 01/06/2021   • Inverted nipple 03/14/2019   • Dyslipidemia 12/20/2018   • Nevus 06/28/2018   • Acquired hypothyroidism 12/08/2017       ROS   No fever or chills.  No nausea or vomiting.  No chest pain or palpitations.  No cough or SOB.  No pain with urination or hematuria.  No black or bloody stools.       Objective:     /64 (BP Location: Right arm, Patient Position:  "Sitting, BP Cuff Size: Adult)   Pulse 72   Temp 37.1 °C (98.8 °F)   Resp 12   Ht 1.575 m (5' 2\")   Wt 49.9 kg (110 lb)   SpO2 96%  Body mass index is 20.12 kg/m².   Physical Exam:  Well developed, well nourished.  Alert, oriented in no acute distress.  Eye contact is good, speech goal directed, affect calm  Eyes: conjunctiva non-injected, sclera non-icteric.  Neck Supple.  No adenopathy or masses in the neck or supraclavicular regions. No thyromegaly  Lungs: clear to auscultation bilaterally with good excursion. No wheezes or rhonchi  CV: regular rate and rhythm. No murmur  Skin: Erythematous scaling rash in patch-like distribution    Assessment and Plan:   The following treatment plan was discussed    1. Dyslipidemia  Patient remains low risk.  Recommend continuing Mediterranean eating plan as she has improved this dramatically.  Continue exercise.  Recheck in 1 year.    2. Prediabetes  Recommend continuing Mediterranean eating plan as she has improved this dramatically.  Continue exercise.  Recheck in 6 months    3. Contact dermatitis due to other agent, unspecified contact dermatitis type  Triamcinolone as directed.  Call if not improving.  Moisturize area.  - triamcinolone acetonide (KENALOG) 0.1 % Cream; Apply 1 Application topically 2 times a day.  Dispense: 15 g; Refill: 1    Any change or worsening of signs or symptoms, patient encouraged to follow-up or report to the emergency room for further evaluation. Patient understands and agrees.    Followup: Return in about 6 months (around 5/2/2022).           "

## 2021-11-15 NOTE — ASSESSMENT & PLAN NOTE
Results for RANGE, GRACIE (MRN [id number]) as of 11/15/2021 13:17   Ref. Range 10/21/2021 12:37   Glycohemoglobin Latest Ref Range: 4.0 - 5.6 % 5.7 (H)   Estim. Avg Glu Latest Units: mg/dL 117

## 2021-12-16 ENCOUNTER — OFFICE VISIT (OUTPATIENT)
Dept: MEDICAL GROUP | Facility: LAB | Age: 56
End: 2021-12-16
Payer: COMMERCIAL

## 2021-12-16 ENCOUNTER — APPOINTMENT (OUTPATIENT)
Dept: MEDICAL GROUP | Facility: LAB | Age: 56
End: 2021-12-16
Payer: COMMERCIAL

## 2021-12-16 VITALS
BODY MASS INDEX: 21.53 KG/M2 | RESPIRATION RATE: 14 BRPM | OXYGEN SATURATION: 95 % | DIASTOLIC BLOOD PRESSURE: 68 MMHG | TEMPERATURE: 98.6 F | HEIGHT: 62 IN | HEART RATE: 75 BPM | WEIGHT: 117 LBS | SYSTOLIC BLOOD PRESSURE: 106 MMHG

## 2021-12-16 DIAGNOSIS — M25.531 RIGHT WRIST PAIN: ICD-10-CM

## 2021-12-16 PROCEDURE — 99214 OFFICE O/P EST MOD 30 MIN: CPT | Performed by: FAMILY MEDICINE

## 2021-12-16 ASSESSMENT — FIBROSIS 4 INDEX: FIB4 SCORE: 1.36

## 2021-12-16 ASSESSMENT — ENCOUNTER SYMPTOMS
HEADACHES: 0
TINGLING: 0
TREMORS: 0
DIZZINESS: 0
FOCAL WEAKNESS: 0

## 2021-12-16 NOTE — PROGRESS NOTES
"Subjective:   Cecilia Taveras is a 56 y.o. female here today for   Chief Complaint   Patient presents with   • Tendonitis       #Right wrist pain:  -Patient has noticed that for the last several months she has been getting small, intermittent episodes of sharp pain located in the exercise phase of her wrist.  Shortness of associated sharp pains in specific places that are more pinpoint in her forearm.  They are intermittent, they do come on more often when she has her arms up near blow drying her hair.  She also states that when she sleeps at night she sleeps with her wrist in flexion will sometimes get similar pain when waking up in the morning.  She denies any numbness, tingling, weakness in the fingers, hand, or arm.  She does state that she had fall back this last summer put in a row can leg.  She states when this occurred she believes she remembers of falling on her outstretched hand on her right side.  Patient is not taking any over-the-counter analgesics as the pain does not last long enough to need any.    Allergies   Allergen Reactions   • Pcn [Penicillins] Shortness of Breath and Rash     Rash, SOB, tachycardia         Current medicines (including changes today)  Current Outpatient Medications   Medication Sig Dispense Refill   • triamcinolone acetonide (KENALOG) 0.1 % Cream Apply 1 Application topically 2 times a day. 15 g 1   • Cholecalciferol (D3 PO) Take 1 capsule by mouth every day.     • levothyroxine (SYNTHROID) 75 MCG Tab Take 1 Tab by mouth Every morning on an empty stomach. 90 Tab 3     No current facility-administered medications for this visit.     She  has a past medical history of Thyroid disease.    ROS   Review of Systems   Musculoskeletal: Positive for joint pain.   Neurological: Negative for dizziness, tingling, tremors, focal weakness and headaches.      Objective:     Physical Exam:  /68   Pulse 75   Temp 37 °C (98.6 °F) (Temporal)   Resp 14   Ht 1.575 m (5' 2.01\")   Wt 53.1 kg " (117 lb)   SpO2 95%  Body mass index is 21.39 kg/m².   Const: Vitals above. Well-appearing.  CV: Inspected/palpated for upper extremity edema. Bilateral radial pulses palpated, noting below if not 2+. Capillary refill evaluated distally, noting below if greater than 2 seconds.  Skin: Inspected for rash or lesions in area of concern.  Neuro/psych: Reflexes at brachioradialis (C5/6), biceps (C5/6), triceps (C7/8): 2+. 2-point discrimination assessed at 2nd finger (C6, median nerve), 3rd finger (C7, median nerve), 5th finger (C8, ulnar nerve), and dorsal web space between 1st/2nd digit (radial nerve). Phalen, Tinel's tests: negative. Observed for normal mood/affect/insight.  MSK: normal gait. Posture: unremarkable. Examination of bilateral wrist/hand:  - Insepected/palpated bilaterally for warmth, upper extremity carriage, ulnar variance, and for deformity and tenderness of the proximal/distal radius and ulna, scaphoid/snuffbox, carpals, metacarpals, phalanges, carpal/CMC/MCP/IP joints, and TFCC.  - Assessed passive/active range of motion bilaterally with forearm pronation/supination, wrist flexion/extension, wrist radial/ulnar deviation, MCP flexion/extension/abduction/adduction, IP joint flexion/extension, and thumb flexion/extension/abduction/adduction/opposition, noting below for any pain or crepitation.  - Assessed muscle strength bilaterally with wrist flexion (C6/7, median/ulnar nerves), wrist extension (C7/8 radial nerve), finger extension (C7, radial nerve), finger abduction (T1, ulnar nerve), and thumb opposition (median nerve), noting below if less than 5/5 or pain. Observed for muscle atrophy in thenar, hypothenar, and interosseus areas.  - Assessed stability bilaterally at the TFCC (piano key test) and wrist, carpal, scapho-lunate (Shuck test), metacarpal, and phalangeal joints. Varus/valgus stress at MCP/IP joints: unremarkable. Finkelstein test negative.    All of the above were found to be  normal      Assessment and Plan:     1. Right wrist pain  -At this time uncertain etiology of pain.  Physical examination was totally benign with no concerning findings.  Is difficult to tell whether or not she is having chronic pain from a FOOSH injury, the's we will complete x-ray of the wrist to assure no chronic osseous deformity.  -At this time I do recommend the patient discontinue exacerbating activities such as overhead activities and blow drying hair.  -At home physical therapy exercises were also given.  Discussed pain treatment if needed.  -She will follow-up as needed.  - DX-WRIST-COMPLETE 3+ RIGHT; Future      Followup: No follow-ups on file.         PLEASE NOTE: This dictation was created using voice recognition software. I have made every reasonable attempt to correct obvious errors, but I expect that there are errors of grammar and possibly content that I did not discover before finalizing the note.

## 2022-01-21 ENCOUNTER — OFFICE VISIT (OUTPATIENT)
Dept: MEDICAL GROUP | Facility: LAB | Age: 57
End: 2022-01-21
Payer: COMMERCIAL

## 2022-01-21 VITALS
WEIGHT: 113 LBS | DIASTOLIC BLOOD PRESSURE: 52 MMHG | SYSTOLIC BLOOD PRESSURE: 90 MMHG | HEART RATE: 70 BPM | HEIGHT: 62 IN | OXYGEN SATURATION: 97 % | RESPIRATION RATE: 14 BRPM | BODY MASS INDEX: 20.8 KG/M2 | TEMPERATURE: 97.8 F

## 2022-01-21 DIAGNOSIS — Z12.31 ENCOUNTER FOR SCREENING MAMMOGRAM FOR BREAST CANCER: ICD-10-CM

## 2022-01-21 DIAGNOSIS — N64.59 INVERTED NIPPLE: ICD-10-CM

## 2022-01-21 PROCEDURE — 99212 OFFICE O/P EST SF 10 MIN: CPT | Performed by: NURSE PRACTITIONER

## 2022-01-21 ASSESSMENT — FIBROSIS 4 INDEX: FIB4 SCORE: 1.36

## 2022-01-21 NOTE — ASSESSMENT & PLAN NOTE
Patient reports that in the last 2 years her nipples became inverted. Yesterday when she was warm she reports that the nipple wasn't sticking out, but they weren't inverted. When she looked closer she thought she saw a small bump. She is concerned that something is growing in her nipple. Denies pain, masses in breasts, skin changes.

## 2022-01-27 NOTE — PROGRESS NOTES
"Subjective:     CC: Diagnoses of Inverted nipple and Encounter for screening mammogram for breast cancer were pertinent to this visit.    HPI:   Cecilia presents today with the following:    Inverted nipple  Patient reports that in the last 2 years her nipples became inverted. Yesterday when she was warm she reports that the nipple wasn't sticking out, but they weren't inverted. When she looked closer she thought she saw a small bump. She is concerned that something is growing in her nipple. Denies pain, masses in breasts, skin changes.     ROS:   Gen: no fevers/chills, no changes in weight  Eyes: no changes in vision  ENT: no sore throat, no hearing loss, no bloody nose  Pulm: no sob, no cough  CV: no chest pain, no palpitations  GI: no nausea/vomiting, no diarrhea  : no dysuria  MSk: no myalgias  Skin: no rash  Neuro: no headaches, no numbness/tingling  Heme/Lymph: no easy bruising        - NOTE: All other systems reviewed and are negative, except as in HPI.    Objective:     Exam: BP (!) 90/52 (BP Location: Right arm, Patient Position: Sitting, BP Cuff Size: Adult)   Pulse 70   Temp 36.6 °C (97.8 °F)   Resp 14   Ht 1.575 m (5' 2.01\")   Wt 51.3 kg (113 lb)   SpO2 97%  Body mass index is 20.66 kg/m².    Constitutional: Alert, no distress, well-groomed.  Skin: Warm, dry, good turgor, no rashes in visible areas.  Eye: Equal, round and reactive, conjunctiva clear, lids normal.  ENMT: Lips without lesions, good dentition, moist mucous membranes.  Neck: Trachea midline, no masses, no thyromegaly.  Breast:  Bilaterally symmetrical, no skin changes or dimpling are  noted.  Both breasts are free of palpable pathology and the axillae are free of lymphadenopathy. Nipples are inverted bilaterally. Debris noted and removed from nipple.  Respiratory: Unlabored respiratory effort, no cough.  MSK: Normal gait, moves all extremities.  Neuro: Grossly non-focal.   Psych: Alert and oriented x3, normal affect and " mood.    Assessment & Plan:     56 y.o. female with the following -     1. Inverted nipple  Debris noted inside nipple. Discussed cleaning with warm soap and water. Supportive care and indications for immediate follow-up discussed with patient. Instructed to return to clinic for any change in condition, further concerns, or worsening of symptoms.    2. Encounter for screening mammogram for breast cancer  Mammogram ordered.   - MA-SCREENING MAMMO BILAT W/TOMOSYNTHESIS W/CAD; Future

## 2022-03-23 DIAGNOSIS — E03.9 ACQUIRED HYPOTHYROIDISM: ICD-10-CM

## 2022-03-23 RX ORDER — LEVOTHYROXINE SODIUM 0.07 MG/1
TABLET ORAL
Qty: 90 TABLET | Refills: 3 | Status: SHIPPED | OUTPATIENT
Start: 2022-03-23 | End: 2022-12-29 | Stop reason: SDUPTHER

## 2022-03-23 NOTE — TELEPHONE ENCOUNTER
Received request via: Pharmacy    Was the patient seen in the last year in this department? Yes  1/21/22  Does the patient have an active prescription (recently filled or refills available) for medication(s) requested? No

## 2022-03-31 ENCOUNTER — HOSPITAL ENCOUNTER (OUTPATIENT)
Dept: RADIOLOGY | Facility: MEDICAL CENTER | Age: 57
End: 2022-03-31
Attending: NURSE PRACTITIONER
Payer: COMMERCIAL

## 2022-03-31 DIAGNOSIS — Z12.31 ENCOUNTER FOR SCREENING MAMMOGRAM FOR BREAST CANCER: ICD-10-CM

## 2022-03-31 PROCEDURE — 77063 BREAST TOMOSYNTHESIS BI: CPT

## 2022-04-02 ENCOUNTER — PATIENT MESSAGE (OUTPATIENT)
Dept: MEDICAL GROUP | Facility: LAB | Age: 57
End: 2022-04-02
Payer: COMMERCIAL

## 2022-04-02 DIAGNOSIS — R73.03 PREDIABETES: ICD-10-CM

## 2022-04-08 ENCOUNTER — HOSPITAL ENCOUNTER (OUTPATIENT)
Dept: RADIOLOGY | Facility: MEDICAL CENTER | Age: 57
End: 2022-04-08
Attending: FAMILY MEDICINE
Payer: COMMERCIAL

## 2022-04-08 DIAGNOSIS — M25.531 RIGHT WRIST PAIN: ICD-10-CM

## 2022-04-08 PROCEDURE — 73110 X-RAY EXAM OF WRIST: CPT | Mod: RT

## 2022-04-16 ENCOUNTER — HOSPITAL ENCOUNTER (OUTPATIENT)
Dept: LAB | Facility: MEDICAL CENTER | Age: 57
End: 2022-04-16
Attending: NURSE PRACTITIONER
Payer: COMMERCIAL

## 2022-04-16 DIAGNOSIS — R73.03 PREDIABETES: ICD-10-CM

## 2022-04-16 LAB
EST. AVERAGE GLUCOSE BLD GHB EST-MCNC: 126 MG/DL
HBA1C MFR BLD: 6 % (ref 4–5.6)

## 2022-04-16 PROCEDURE — 83036 HEMOGLOBIN GLYCOSYLATED A1C: CPT

## 2022-04-16 PROCEDURE — 36415 COLL VENOUS BLD VENIPUNCTURE: CPT

## 2022-04-30 ENCOUNTER — HOSPITAL ENCOUNTER (EMERGENCY)
Facility: MEDICAL CENTER | Age: 57
End: 2022-04-30
Attending: EMERGENCY MEDICINE
Payer: COMMERCIAL

## 2022-04-30 ENCOUNTER — APPOINTMENT (OUTPATIENT)
Dept: RADIOLOGY | Facility: MEDICAL CENTER | Age: 57
End: 2022-04-30
Attending: EMERGENCY MEDICINE
Payer: COMMERCIAL

## 2022-04-30 VITALS
HEIGHT: 62 IN | RESPIRATION RATE: 18 BRPM | DIASTOLIC BLOOD PRESSURE: 68 MMHG | HEART RATE: 60 BPM | WEIGHT: 112.43 LBS | SYSTOLIC BLOOD PRESSURE: 123 MMHG | OXYGEN SATURATION: 100 % | BODY MASS INDEX: 20.69 KG/M2 | TEMPERATURE: 98 F

## 2022-04-30 DIAGNOSIS — R10.9 FLANK PAIN: ICD-10-CM

## 2022-04-30 LAB
ALBUMIN SERPL BCP-MCNC: 4.3 G/DL (ref 3.2–4.9)
ALBUMIN/GLOB SERPL: 1.5 G/DL
ALP SERPL-CCNC: 94 U/L (ref 30–99)
ALT SERPL-CCNC: 16 U/L (ref 2–50)
ANION GAP SERPL CALC-SCNC: 11 MMOL/L (ref 7–16)
APPEARANCE UR: CLEAR
AST SERPL-CCNC: 16 U/L (ref 12–45)
BASOPHILS # BLD AUTO: 0.9 % (ref 0–1.8)
BASOPHILS # BLD: 0.04 K/UL (ref 0–0.12)
BILIRUB SERPL-MCNC: 0.4 MG/DL (ref 0.1–1.5)
BILIRUB UR QL STRIP.AUTO: NEGATIVE
BUN SERPL-MCNC: 22 MG/DL (ref 8–22)
CALCIUM SERPL-MCNC: 9.4 MG/DL (ref 8.4–10.2)
CHLORIDE SERPL-SCNC: 102 MMOL/L (ref 96–112)
CO2 SERPL-SCNC: 23 MMOL/L (ref 20–33)
COLOR UR: YELLOW
CREAT SERPL-MCNC: 0.55 MG/DL (ref 0.5–1.4)
EOSINOPHIL # BLD AUTO: 0.06 K/UL (ref 0–0.51)
EOSINOPHIL NFR BLD: 1.4 % (ref 0–6.9)
ERYTHROCYTE [DISTWIDTH] IN BLOOD BY AUTOMATED COUNT: 43.9 FL (ref 35.9–50)
GFR SERPLBLD CREATININE-BSD FMLA CKD-EPI: 108 ML/MIN/1.73 M 2
GLOBULIN SER CALC-MCNC: 2.8 G/DL (ref 1.9–3.5)
GLUCOSE SERPL-MCNC: 110 MG/DL (ref 65–99)
GLUCOSE UR STRIP.AUTO-MCNC: NEGATIVE MG/DL
HCG UR QL: NEGATIVE
HCT VFR BLD AUTO: 42.4 % (ref 37–47)
HGB BLD-MCNC: 14 G/DL (ref 12–16)
IMM GRANULOCYTES # BLD AUTO: 0.01 K/UL (ref 0–0.11)
IMM GRANULOCYTES NFR BLD AUTO: 0.2 % (ref 0–0.9)
KETONES UR STRIP.AUTO-MCNC: ABNORMAL MG/DL
LEUKOCYTE ESTERASE UR QL STRIP.AUTO: NEGATIVE
LIPASE SERPL-CCNC: 27 U/L (ref 7–58)
LYMPHOCYTES # BLD AUTO: 1.36 K/UL (ref 1–4.8)
LYMPHOCYTES NFR BLD: 30.8 % (ref 22–41)
MCH RBC QN AUTO: 29 PG (ref 27–33)
MCHC RBC AUTO-ENTMCNC: 33 G/DL (ref 33.6–35)
MCV RBC AUTO: 87.8 FL (ref 81.4–97.8)
MICRO URNS: ABNORMAL
MONOCYTES # BLD AUTO: 0.27 K/UL (ref 0–0.85)
MONOCYTES NFR BLD AUTO: 6.1 % (ref 0–13.4)
NEUTROPHILS # BLD AUTO: 2.67 K/UL (ref 2–7.15)
NEUTROPHILS NFR BLD: 60.6 % (ref 44–72)
NITRITE UR QL STRIP.AUTO: NEGATIVE
NRBC # BLD AUTO: 0 K/UL
NRBC BLD-RTO: 0 /100 WBC
PH UR STRIP.AUTO: 6 [PH] (ref 5–8)
PLATELET # BLD AUTO: 274 K/UL (ref 164–446)
PMV BLD AUTO: 9.4 FL (ref 9–12.9)
POTASSIUM SERPL-SCNC: 3.7 MMOL/L (ref 3.6–5.5)
PROT SERPL-MCNC: 7.1 G/DL (ref 6–8.2)
PROT UR QL STRIP: NEGATIVE MG/DL
RBC # BLD AUTO: 4.83 M/UL (ref 4.2–5.4)
RBC UR QL AUTO: NEGATIVE
SODIUM SERPL-SCNC: 136 MMOL/L (ref 135–145)
SP GR UR STRIP.AUTO: 1.02
WBC # BLD AUTO: 4.4 K/UL (ref 4.8–10.8)

## 2022-04-30 PROCEDURE — 74176 CT ABD & PELVIS W/O CONTRAST: CPT

## 2022-04-30 PROCEDURE — 80053 COMPREHEN METABOLIC PANEL: CPT

## 2022-04-30 PROCEDURE — 81025 URINE PREGNANCY TEST: CPT

## 2022-04-30 PROCEDURE — 700105 HCHG RX REV CODE 258: Performed by: EMERGENCY MEDICINE

## 2022-04-30 PROCEDURE — 81003 URINALYSIS AUTO W/O SCOPE: CPT

## 2022-04-30 PROCEDURE — 85025 COMPLETE CBC W/AUTO DIFF WBC: CPT

## 2022-04-30 PROCEDURE — 96374 THER/PROPH/DIAG INJ IV PUSH: CPT

## 2022-04-30 PROCEDURE — 36415 COLL VENOUS BLD VENIPUNCTURE: CPT

## 2022-04-30 PROCEDURE — 700111 HCHG RX REV CODE 636 W/ 250 OVERRIDE (IP): Performed by: EMERGENCY MEDICINE

## 2022-04-30 PROCEDURE — 83690 ASSAY OF LIPASE: CPT

## 2022-04-30 PROCEDURE — 99285 EMERGENCY DEPT VISIT HI MDM: CPT

## 2022-04-30 PROCEDURE — 96375 TX/PRO/DX INJ NEW DRUG ADDON: CPT

## 2022-04-30 RX ORDER — SODIUM CHLORIDE 9 MG/ML
1000 INJECTION, SOLUTION INTRAVENOUS ONCE
Status: COMPLETED | OUTPATIENT
Start: 2022-04-30 | End: 2022-04-30

## 2022-04-30 RX ORDER — KETOROLAC TROMETHAMINE 30 MG/ML
30 INJECTION, SOLUTION INTRAMUSCULAR; INTRAVENOUS ONCE
Status: COMPLETED | OUTPATIENT
Start: 2022-04-30 | End: 2022-04-30

## 2022-04-30 RX ADMIN — SODIUM CHLORIDE 1000 ML: 9 INJECTION, SOLUTION INTRAVENOUS at 10:17

## 2022-04-30 RX ADMIN — KETOROLAC TROMETHAMINE 30 MG: 30 INJECTION, SOLUTION INTRAMUSCULAR at 10:17

## 2022-04-30 RX ADMIN — FENTANYL CITRATE 50 MCG: 50 INJECTION, SOLUTION INTRAMUSCULAR; INTRAVENOUS at 10:16

## 2022-04-30 ASSESSMENT — PAIN DESCRIPTION - PAIN TYPE: TYPE: ACUTE PAIN

## 2022-04-30 ASSESSMENT — PAIN DESCRIPTION - DESCRIPTORS
DESCRIPTORS: ACHING
DESCRIPTORS: ACHING

## 2022-04-30 ASSESSMENT — FIBROSIS 4 INDEX: FIB4 SCORE: 1.36

## 2022-04-30 NOTE — ED TRIAGE NOTES
"Presents complaining of acute onset of left flank pain referred to her mid abdomen without any N/V/D and recurring for the past 3 days.   Chief Complaint   Patient presents with   • Flank Pain   • Abdominal Pain     /95   Pulse 84   Temp 36.2 °C (97.2 °F) (Temporal)   Resp 20   Ht 1.575 m (5' 2\")   Wt 51 kg (112 lb 7 oz)   SpO2 100%   BMI 20.56 kg/m²   Has this patient been vaccinated for COVID YES  If not, would they like to be vaccinated while in the ER if eligible?  N/A  Would the patient like to speak with the ERP about the possibility of receiving the COVID vaccine today before making a decision? N/A     "

## 2022-04-30 NOTE — DISCHARGE INSTRUCTIONS
Take Motrin and Tylenol as discussed.  Recheck in 24 hours if you are not improving and sooner if worse

## 2022-04-30 NOTE — ED PROVIDER NOTES
"ED Provider Note    CHIEF COMPLAINT  Chief Complaint   Patient presents with   • Flank Pain   • Abdominal Pain       HPI  Cecilia Taveras is a 56 y.o. female who presents left flank pain.  The patient states the pain started yesterday.  She states it was mild to moderate and today it is much more severe.  She states the pain is intermittent and she is unaware of any exacerbating or relieving factors.  She has noted some blood in her urine this morning.  She does not have any dysuria.  She has not had any associated fevers.  She has not had any nausea or vomiting.  The pain seems to be localized to the left flank with no radicular component.  She has not had any prior abdominal surgeries.    REVIEW OF SYSTEMS  See HPI for further details. All other systems are negative.     PAST MEDICAL HISTORY  Past Medical History:   Diagnosis Date   • Thyroid disease        FAMILY HISTORY  [unfilled]    SOCIAL HISTORY  Social History     Socioeconomic History   • Marital status:    Tobacco Use   • Smoking status: Never Smoker   • Smokeless tobacco: Never Used   Vaping Use   • Vaping Use: Never used   Substance and Sexual Activity   • Alcohol use: No   • Drug use: No   • Sexual activity: Yes     Partners: Male     Birth control/protection: Pill       SURGICAL HISTORY  Past Surgical History:   Procedure Laterality Date   • TIBIA NAILING INTRAMEDULLARY Left 7/25/2021    Procedure: INSERTION, INTRAMEDULLARY JONAS, TIBIA;  Surgeon: Charly Ochoa M.D.;  Location: SURGERY [facility];  Service: Orthopedics       CURRENT MEDICATIONS  Home Medications    **Home medications have not yet been reviewed for this encounter**         ALLERGIES  Allergies   Allergen Reactions   • Pcn [Penicillins] Shortness of Breath and Rash     Rash, SOB, tachycardia       PHYSICAL EXAM  VITAL SIGNS: /95   Pulse 84   Temp 36.2 °C (97.2 °F) (Temporal)   Resp 20   Ht 1.575 m (5' 2\")   Wt 51 kg (112 lb 7 oz)   SpO2 100%   BMI 20.56 kg/m²   "     Constitutional: In acute distress, Non-toxic appearance.   HENT: Normocephalic, Atraumatic, Bilateral external ears normal, Oropharynx moist, No oral exudates, Nose normal.   Eyes: PERRLA, EOMI, Conjunctiva normal, No discharge.   Neck: Normal range of motion, No tenderness, Supple, No stridor.   Lymphatic: No lymphadenopathy noted.   Cardiovascular: Normal heart rate, Normal rhythm, No murmurs, No rubs, No gallops.   Thorax & Lungs: Normal breath sounds, No respiratory distress, No wheezing, No chest tenderness.   Abdomen: Bowel sounds normal, Soft, No tenderness, No masses, No pulsatile masses.   Skin: Warm, Dry, No erythema, No rash.   Back: No tenderness, No CVA tenderness.   Extremities: Intact distal pulses, No edema, No tenderness, No cyanosis, No clubbing.   Neurologic: Alert & oriented x 3, Normal motor function, Normal sensory function, No focal deficits noted.   Psychiatric: Affect normal, Judgment normal, Mood normal.     Results for orders placed or performed during the hospital encounter of 04/30/22   URINALYSIS CULTURE, IF INDICATED    Specimen: Urine   Result Value Ref Range    Color Yellow     Character Clear     Specific Gravity 1.020 <1.035    Ph 6.0 5.0 - 8.0    Glucose Negative Negative mg/dL    Ketones Trace (A) Negative mg/dL    Protein Negative Negative mg/dL    Bilirubin Negative Negative    Nitrite Negative Negative    Leukocyte Esterase Negative Negative    Occult Blood Negative Negative    Micro Urine Req see below    HCG QUALITATIVE UR   Result Value Ref Range    Beta-Hcg Urine Negative Negative   CBC WITH DIFFERENTIAL   Result Value Ref Range    WBC 4.4 (L) 4.8 - 10.8 K/uL    RBC 4.83 4.20 - 5.40 M/uL    Hemoglobin 14.0 12.0 - 16.0 g/dL    Hematocrit 42.4 37.0 - 47.0 %    MCV 87.8 81.4 - 97.8 fL    MCH 29.0 27.0 - 33.0 pg    MCHC 33.0 (L) 33.6 - 35.0 g/dL    RDW 43.9 35.9 - 50.0 fL    Platelet Count 274 164 - 446 K/uL    MPV 9.4 9.0 - 12.9 fL    Neutrophils-Polys 60.60 44.00 - 72.00  %    Lymphocytes 30.80 22.00 - 41.00 %    Monocytes 6.10 0.00 - 13.40 %    Eosinophils 1.40 0.00 - 6.90 %    Basophils 0.90 0.00 - 1.80 %    Immature Granulocytes 0.20 0.00 - 0.90 %    Nucleated RBC 0.00 /100 WBC    Neutrophils (Absolute) 2.67 2.00 - 7.15 K/uL    Lymphs (Absolute) 1.36 1.00 - 4.80 K/uL    Monos (Absolute) 0.27 0.00 - 0.85 K/uL    Eos (Absolute) 0.06 0.00 - 0.51 K/uL    Baso (Absolute) 0.04 0.00 - 0.12 K/uL    Immature Granulocytes (abs) 0.01 0.00 - 0.11 K/uL    NRBC (Absolute) 0.00 K/uL   COMP METABOLIC PANEL   Result Value Ref Range    Sodium 136 135 - 145 mmol/L    Potassium 3.7 3.6 - 5.5 mmol/L    Chloride 102 96 - 112 mmol/L    Co2 23 20 - 33 mmol/L    Anion Gap 11.0 7.0 - 16.0    Glucose 110 (H) 65 - 99 mg/dL    Bun 22 8 - 22 mg/dL    Creatinine 0.55 0.50 - 1.40 mg/dL    Calcium 9.4 8.4 - 10.2 mg/dL    AST(SGOT) 16 12 - 45 U/L    ALT(SGPT) 16 2 - 50 U/L    Alkaline Phosphatase 94 30 - 99 U/L    Total Bilirubin 0.4 0.1 - 1.5 mg/dL    Albumin 4.3 3.2 - 4.9 g/dL    Total Protein 7.1 6.0 - 8.2 g/dL    Globulin 2.8 1.9 - 3.5 g/dL    A-G Ratio 1.5 g/dL   LIPASE   Result Value Ref Range    Lipase 27 7 - 58 U/L   ESTIMATED GFR   Result Value Ref Range    GFR (CKD-EPI) 108 >60 mL/min/1.73 m 2       RADIOLOGY/PROCEDURES  CT-RENAL COLIC EVALUATION(A/P W/O)   Final Result         1.  No urolithiasis or hydronephrosis.   2.  No acute inflammatory abnormality within the abdomen or pelvis. Normal appendix.                     COURSE & MEDICAL DECISION MAKING  Pertinent Labs & Imaging studies reviewed. (See chart for details)  This a 56-year-old female who presents to the emergency department with left flank pain.  Initial concern for ureterolithiasis versus pyelonephritis but CT scan does not show any evidence of ureterolithiasis.  Furthermore the urinalysis does not support ureterolithiasis nor pyelonephritis.  CT scan otherwise does not show any evidence of intra-abdominal inflammation.  At this time I do  not have a clear source.  This could be muscular versus constipation.  The patient will be discharged home with instructions take Motrin and Tylenol as as needed for pain control and she will recheck in 24 hours if she is not better and sooner if worse.    FINAL IMPRESSION  1.  Left flank pain    Disposition  The patient will be discharged in stable condition         Electronically signed by: Maurilio Garcia M.D., 4/30/2022 9:47 AM

## 2022-04-30 NOTE — ED NOTES
Urine sent to lab.   Coronary artery disease of native artery of native heart with stable angina pectoris

## 2022-05-02 ENCOUNTER — HOSPITAL ENCOUNTER (EMERGENCY)
Facility: MEDICAL CENTER | Age: 57
End: 2022-05-02
Attending: EMERGENCY MEDICINE
Payer: COMMERCIAL

## 2022-05-02 VITALS
OXYGEN SATURATION: 99 % | TEMPERATURE: 98.2 F | HEIGHT: 62 IN | RESPIRATION RATE: 18 BRPM | BODY MASS INDEX: 20.77 KG/M2 | DIASTOLIC BLOOD PRESSURE: 56 MMHG | WEIGHT: 112.88 LBS | HEART RATE: 68 BPM | SYSTOLIC BLOOD PRESSURE: 118 MMHG

## 2022-05-02 DIAGNOSIS — R10.9 FLANK PAIN: ICD-10-CM

## 2022-05-02 DIAGNOSIS — R10.13 EPIGASTRIC PAIN: ICD-10-CM

## 2022-05-02 LAB
ALBUMIN SERPL BCP-MCNC: 4.4 G/DL (ref 3.2–4.9)
ALBUMIN/GLOB SERPL: 1.5 G/DL
ALP SERPL-CCNC: 92 U/L (ref 30–99)
ALT SERPL-CCNC: 17 U/L (ref 2–50)
ANION GAP SERPL CALC-SCNC: 10 MMOL/L (ref 7–16)
APPEARANCE UR: CLEAR
AST SERPL-CCNC: 18 U/L (ref 12–45)
BASOPHILS # BLD AUTO: 0.4 % (ref 0–1.8)
BASOPHILS # BLD: 0.02 K/UL (ref 0–0.12)
BILIRUB SERPL-MCNC: 0.3 MG/DL (ref 0.1–1.5)
BILIRUB UR QL STRIP.AUTO: NEGATIVE
BUN SERPL-MCNC: 18 MG/DL (ref 8–22)
CALCIUM SERPL-MCNC: 9.3 MG/DL (ref 8.4–10.2)
CHLORIDE SERPL-SCNC: 104 MMOL/L (ref 96–112)
CO2 SERPL-SCNC: 25 MMOL/L (ref 20–33)
COLOR UR: YELLOW
CREAT SERPL-MCNC: 0.59 MG/DL (ref 0.5–1.4)
EOSINOPHIL # BLD AUTO: 0.05 K/UL (ref 0–0.51)
EOSINOPHIL NFR BLD: 1 % (ref 0–6.9)
ERYTHROCYTE [DISTWIDTH] IN BLOOD BY AUTOMATED COUNT: 45.1 FL (ref 35.9–50)
GFR SERPLBLD CREATININE-BSD FMLA CKD-EPI: 107 ML/MIN/1.73 M 2
GLOBULIN SER CALC-MCNC: 3 G/DL (ref 1.9–3.5)
GLUCOSE SERPL-MCNC: 111 MG/DL (ref 65–99)
GLUCOSE UR STRIP.AUTO-MCNC: NEGATIVE MG/DL
HCT VFR BLD AUTO: 41.1 % (ref 37–47)
HGB BLD-MCNC: 13.5 G/DL (ref 12–16)
IMM GRANULOCYTES # BLD AUTO: 0.01 K/UL (ref 0–0.11)
IMM GRANULOCYTES NFR BLD AUTO: 0.2 % (ref 0–0.9)
KETONES UR STRIP.AUTO-MCNC: NEGATIVE MG/DL
LEUKOCYTE ESTERASE UR QL STRIP.AUTO: NEGATIVE
LIPASE SERPL-CCNC: 33 U/L (ref 7–58)
LYMPHOCYTES # BLD AUTO: 1.26 K/UL (ref 1–4.8)
LYMPHOCYTES NFR BLD: 24.7 % (ref 22–41)
MCH RBC QN AUTO: 29.2 PG (ref 27–33)
MCHC RBC AUTO-ENTMCNC: 32.8 G/DL (ref 33.6–35)
MCV RBC AUTO: 88.8 FL (ref 81.4–97.8)
MICRO URNS: NORMAL
MONOCYTES # BLD AUTO: 0.34 K/UL (ref 0–0.85)
MONOCYTES NFR BLD AUTO: 6.7 % (ref 0–13.4)
NEUTROPHILS # BLD AUTO: 3.43 K/UL (ref 2–7.15)
NEUTROPHILS NFR BLD: 67 % (ref 44–72)
NITRITE UR QL STRIP.AUTO: NEGATIVE
NRBC # BLD AUTO: 0 K/UL
NRBC BLD-RTO: 0 /100 WBC
PH UR STRIP.AUTO: 6 [PH] (ref 5–8)
PLATELET # BLD AUTO: 272 K/UL (ref 164–446)
PMV BLD AUTO: 9.2 FL (ref 9–12.9)
POTASSIUM SERPL-SCNC: 3.6 MMOL/L (ref 3.6–5.5)
PROT SERPL-MCNC: 7.4 G/DL (ref 6–8.2)
PROT UR QL STRIP: NEGATIVE MG/DL
RBC # BLD AUTO: 4.63 M/UL (ref 4.2–5.4)
RBC UR QL AUTO: NEGATIVE
SODIUM SERPL-SCNC: 139 MMOL/L (ref 135–145)
SP GR UR STRIP.AUTO: 1.01
WBC # BLD AUTO: 5.1 K/UL (ref 4.8–10.8)

## 2022-05-02 PROCEDURE — 700111 HCHG RX REV CODE 636 W/ 250 OVERRIDE (IP): Performed by: EMERGENCY MEDICINE

## 2022-05-02 PROCEDURE — A9270 NON-COVERED ITEM OR SERVICE: HCPCS | Performed by: EMERGENCY MEDICINE

## 2022-05-02 PROCEDURE — 99284 EMERGENCY DEPT VISIT MOD MDM: CPT

## 2022-05-02 PROCEDURE — 85025 COMPLETE CBC W/AUTO DIFF WBC: CPT

## 2022-05-02 PROCEDURE — 700102 HCHG RX REV CODE 250 W/ 637 OVERRIDE(OP): Performed by: EMERGENCY MEDICINE

## 2022-05-02 PROCEDURE — 80053 COMPREHEN METABOLIC PANEL: CPT

## 2022-05-02 PROCEDURE — 81003 URINALYSIS AUTO W/O SCOPE: CPT

## 2022-05-02 PROCEDURE — 83690 ASSAY OF LIPASE: CPT

## 2022-05-02 PROCEDURE — 36415 COLL VENOUS BLD VENIPUNCTURE: CPT

## 2022-05-02 RX ORDER — ONDANSETRON 4 MG/1
4 TABLET, ORALLY DISINTEGRATING ORAL EVERY 6 HOURS PRN
Qty: 10 TABLET | Refills: 0 | Status: SHIPPED | OUTPATIENT
Start: 2022-05-02 | End: 2022-10-04

## 2022-05-02 RX ORDER — OMEPRAZOLE 20 MG/1
20 CAPSULE, DELAYED RELEASE ORAL DAILY
Qty: 30 CAPSULE | Refills: 0 | Status: SHIPPED | OUTPATIENT
Start: 2022-05-02 | End: 2022-05-31 | Stop reason: SDUPTHER

## 2022-05-02 RX ORDER — OMEPRAZOLE 20 MG/1
20 CAPSULE, DELAYED RELEASE ORAL DAILY
Status: DISCONTINUED | OUTPATIENT
Start: 2022-05-02 | End: 2022-05-02 | Stop reason: HOSPADM

## 2022-05-02 RX ORDER — ONDANSETRON 4 MG/1
4 TABLET, ORALLY DISINTEGRATING ORAL ONCE
Status: COMPLETED | OUTPATIENT
Start: 2022-05-02 | End: 2022-05-02

## 2022-05-02 RX ADMIN — LIDOCAINE HYDROCHLORIDE 30 ML: 20 SOLUTION OROPHARYNGEAL at 08:52

## 2022-05-02 RX ADMIN — ONDANSETRON 4 MG: 4 TABLET, ORALLY DISINTEGRATING ORAL at 08:52

## 2022-05-02 RX ADMIN — OMEPRAZOLE 20 MG: 20 CAPSULE, DELAYED RELEASE ORAL at 08:51

## 2022-05-02 ASSESSMENT — ENCOUNTER SYMPTOMS
VOMITING: 0
NAUSEA: 0
CHILLS: 0
FEVER: 0
HEADACHES: 0
ABDOMINAL PAIN: 1
DIZZINESS: 0
SHORTNESS OF BREATH: 0

## 2022-05-02 ASSESSMENT — PAIN DESCRIPTION - DESCRIPTORS: DESCRIPTORS: STABBING

## 2022-05-02 ASSESSMENT — FIBROSIS 4 INDEX: FIB4 SCORE: 0.79

## 2022-05-02 NOTE — ED NOTES
ERP at bedside. Pt agrees with plan of care discussed by ERP. AIDET acknowledged with patient.IV established. Blood sent to lab. Shereen in low position, side rail up for pt safety. Call light within reach. Will continue to monitor.

## 2022-05-02 NOTE — DISCHARGE INSTRUCTIONS
As we discussed you may have irritation of your stomach lining, please follow-up with GI doctor for ongoing work-up, in the meantime take your omeprazole as prescribed

## 2022-05-02 NOTE — ED TRIAGE NOTES
"Chief Complaint   Patient presents with   • LUQ Pain     \"off and on now like six days\"   • Flank Pain     Left     /69   Pulse 62   Temp 37.1 °C (98.7 °F) (Temporal)   Resp 15   Ht 1.575 m (5' 2\")   Wt 51.2 kg (112 lb 14 oz)   LMP  (LMP Unknown)   SpO2 97%   BMI 20.65 kg/m²     Pt ambulated to ED by self for c/o ongoing LUQ and flank pain.  Pt states was seen on 04/30/22 for c/o same, unknown cause at that time.  Pt states pain is getting progressively worse, denies N/V, denies dysuria, states has been voiding more frequently.      "

## 2022-05-02 NOTE — ED PROVIDER NOTES
"ED Provider Note    Primary care provider: Lula Heath M.D.  Means of arrival: private vehicle  History obtained from: patient  History limited by: none    CHIEF COMPLAINT  Chief Complaint   Patient presents with   • LUQ Pain     \"off and on now like six days\"   • Flank Pain     Left       OUMOU Taveras is a 56 y.o. female who presents to the Emergency Department for evaluation of flank pain.  Patient was seen on 4/30/2022 for evaluation of left flank pain.  Labs, urinalysis and CT were all done at the time which were unremarkable.  She was subsequently discharged in advised to return again if she had recurrent symptoms.    Patient reports for the past 6 days she has had this epigastric pain that radiates to her left flank.  She reports that it comes in waves and is sharp.  She reports associated decreased appetite and generalized abdominal bloating.  She is not having any nausea or vomiting or dysuria.  No prior history of abdominal surgeries.  She has never followed up with gastroenterology in the past.    REVIEW OF SYSTEMS  Review of Systems   Constitutional: Negative for chills and fever.   Respiratory: Negative for shortness of breath.    Cardiovascular: Negative for chest pain.   Gastrointestinal: Positive for abdominal pain. Negative for nausea and vomiting.   Genitourinary: Negative for dysuria.   Neurological: Negative for dizziness and headaches.   All other systems reviewed and are negative.        PAST MEDICAL HISTORY   has a past medical history of Thyroid disease.    SURGICAL HISTORY   has a past surgical history that includes tibia nailing intramedullary (Left, 7/25/2021).    SOCIAL HISTORY  Social History     Tobacco Use   • Smoking status: Never Smoker   • Smokeless tobacco: Never Used   Vaping Use   • Vaping Use: Never used   Substance Use Topics   • Alcohol use: No   • Drug use: No      Social History     Substance and Sexual Activity   Drug Use No       FAMILY HISTORY  Family History " "  Problem Relation Age of Onset   • Diabetes Mother    • Diabetes Father    • Diabetes Maternal Uncle    • Diabetes Maternal Grandmother    • Diabetes Paternal Grandfather    • Cancer Neg Hx    • Heart Disease Neg Hx    • Hypertension Neg Hx    • Stroke Neg Hx        CURRENT MEDICATIONS  Home Medications    **Home medications have not yet been reviewed for this encounter**         ALLERGIES  Allergies   Allergen Reactions   • Pcn [Penicillins] Shortness of Breath and Rash     Rash, SOB, tachycardia       PHYSICAL EXAM  VITAL SIGNS: /69   Pulse 62   Temp 37.1 °C (98.7 °F) (Temporal)   Resp 15   Ht 1.575 m (5' 2\")   Wt 51.2 kg (112 lb 14 oz)   LMP  (LMP Unknown)   SpO2 97%   BMI 20.65 kg/m²   Vitals reviewed by myself.  Physical Exam  Nursing note and vitals reviewed.  Constitutional: Well-developed and well-nourished. No acute distress.   HENT: Head is normocephalic and atraumatic.  Eyes: extra-ocular movements intact  Cardiovascular: regular rate and  regular rhythm. No murmur heard.  Pulmonary/Chest: Breath sounds normal. No wheezes or rales.   Abdominal: Soft and non-tender. No distention.  negative Sanders sign, negative McBurney's point tenderness, no CVA tenderness bilaterally  Musculoskeletal: Extremities exhibit normal range of motion without edema or tenderness.   Neurological: Awake and alert  Skin: Skin is warm and dry. No rash.           DIAGNOSTIC STUDIES /  LABS  Labs Reviewed   CBC WITH DIFFERENTIAL - Abnormal; Notable for the following components:       Result Value    MCHC 32.8 (*)     All other components within normal limits   COMP METABOLIC PANEL - Abnormal; Notable for the following components:    Glucose 111 (*)     All other components within normal limits   LIPASE   URINALYSIS,CULTURE IF INDICATED    Narrative:     Indication for culture:->Unexplained new onset of Flank pain  and/or Costovertebral angle tenderness   ESTIMATED GFR       All labs reviewed by me.      COURSE & " MEDICAL DECISION MAKING  Nursing notes, VS, PMSFHx reviewed in chart.    Patient is a 56-year-old female who comes in for evaluation of abdominal and flank pain.  Differential diagnosis includes gastritis, peptic ulcer disease, urinary tract infection, nephrolithiasis.  Review of records from the other day demonstrates no overt UTI or nephrolithiasis, no acute pathology on CT.  I will obtain repeat labs and urinalysis today.    Patient's initial vitals are within normal limits.  Given her history I am concerned about gastritis versus peptic ulcer disease and I will start her on GI cocktail, omeprazole and Zofran for management of her symptoms.  After treatment patient reports slight improvement.  Labs returned and are unremarkable.  Urinalysis returns demonstrates no signs of infection.  No blood in the urine making nephrolithiasis less likely especially with recent normal CT scan.  At this time no clear of her discomfort.  I advised her we cannot definitively rule out gastritis versus peptic ulcer disease and she should follow-up with gastroenterology and is amenable to this plan.  In the meantime I will start her on omeprazole and Zofran as needed to manage her symptoms.  She has been given strict return precautions and discharged in stable condition.      FINAL IMPRESSION  1. Epigastric pain    2. Flank pain

## 2022-05-31 DIAGNOSIS — R10.13 EPIGASTRIC PAIN: ICD-10-CM

## 2022-05-31 RX ORDER — OMEPRAZOLE 20 MG/1
20 CAPSULE, DELAYED RELEASE ORAL DAILY
Qty: 30 CAPSULE | Refills: 3 | Status: SHIPPED | OUTPATIENT
Start: 2022-05-31 | End: 2022-08-29

## 2022-05-31 NOTE — TELEPHONE ENCOUNTER
Received request via: Pharmacy    Was the patient seen in the last year in this department? Yes    Does the patient have an active prescription (recently filled or refills available) for medication(s) requested? No  
decreased weight-shifting ability
decreased step length/decreased weight-shifting ability

## 2022-06-06 ENCOUNTER — TELEPHONE (OUTPATIENT)
Dept: MEDICAL GROUP | Facility: LAB | Age: 57
End: 2022-06-06
Payer: COMMERCIAL

## 2022-06-06 NOTE — TELEPHONE ENCOUNTER
1. Caller Name: Cecilia                         Call Back Number: [phone] (home)        How would the patient prefer to be contacted with a response: Phone call OK to leave a detailed message    Pt has side/rib pain and low back pain.  She odette to ER about a month ago for this and it resolved.  It is back now.  I left a message on her phone that I'd like to schedule her to be seen.

## 2022-06-07 ENCOUNTER — HOSPITAL ENCOUNTER (EMERGENCY)
Facility: MEDICAL CENTER | Age: 57
End: 2022-06-07
Attending: EMERGENCY MEDICINE
Payer: COMMERCIAL

## 2022-06-07 VITALS
SYSTOLIC BLOOD PRESSURE: 103 MMHG | HEART RATE: 62 BPM | HEIGHT: 62 IN | WEIGHT: 113.1 LBS | OXYGEN SATURATION: 97 % | TEMPERATURE: 98.1 F | BODY MASS INDEX: 20.81 KG/M2 | RESPIRATION RATE: 17 BRPM | DIASTOLIC BLOOD PRESSURE: 65 MMHG

## 2022-06-07 DIAGNOSIS — R10.9 LEFT FLANK PAIN: ICD-10-CM

## 2022-06-07 LAB
ALBUMIN SERPL BCP-MCNC: 4.3 G/DL (ref 3.2–4.9)
ALBUMIN/GLOB SERPL: 1.4 G/DL
ALP SERPL-CCNC: 89 U/L (ref 30–99)
ALT SERPL-CCNC: 17 U/L (ref 2–50)
ANION GAP SERPL CALC-SCNC: 12 MMOL/L (ref 7–16)
APPEARANCE UR: CLEAR
AST SERPL-CCNC: 18 U/L (ref 12–45)
BASOPHILS # BLD AUTO: 0.5 % (ref 0–1.8)
BASOPHILS # BLD: 0.03 K/UL (ref 0–0.12)
BILIRUB SERPL-MCNC: 0.3 MG/DL (ref 0.1–1.5)
BILIRUB UR QL STRIP.AUTO: NEGATIVE
BUN SERPL-MCNC: 22 MG/DL (ref 8–22)
CALCIUM SERPL-MCNC: 9 MG/DL (ref 8.4–10.2)
CHLORIDE SERPL-SCNC: 103 MMOL/L (ref 96–112)
CO2 SERPL-SCNC: 22 MMOL/L (ref 20–33)
COLOR UR: YELLOW
CREAT SERPL-MCNC: 0.52 MG/DL (ref 0.5–1.4)
EOSINOPHIL # BLD AUTO: 0.15 K/UL (ref 0–0.51)
EOSINOPHIL NFR BLD: 2.7 % (ref 0–6.9)
ERYTHROCYTE [DISTWIDTH] IN BLOOD BY AUTOMATED COUNT: 46.4 FL (ref 35.9–50)
GFR SERPLBLD CREATININE-BSD FMLA CKD-EPI: 110 ML/MIN/1.73 M 2
GLOBULIN SER CALC-MCNC: 3 G/DL (ref 1.9–3.5)
GLUCOSE SERPL-MCNC: 105 MG/DL (ref 65–99)
GLUCOSE UR STRIP.AUTO-MCNC: NEGATIVE MG/DL
HCT VFR BLD AUTO: 43 % (ref 37–47)
HGB BLD-MCNC: 14 G/DL (ref 12–16)
IMM GRANULOCYTES # BLD AUTO: 0.02 K/UL (ref 0–0.11)
IMM GRANULOCYTES NFR BLD AUTO: 0.4 % (ref 0–0.9)
KETONES UR STRIP.AUTO-MCNC: ABNORMAL MG/DL
LEUKOCYTE ESTERASE UR QL STRIP.AUTO: NEGATIVE
LIPASE SERPL-CCNC: 23 U/L (ref 7–58)
LYMPHOCYTES # BLD AUTO: 1.65 K/UL (ref 1–4.8)
LYMPHOCYTES NFR BLD: 29.4 % (ref 22–41)
MCH RBC QN AUTO: 29.3 PG (ref 27–33)
MCHC RBC AUTO-ENTMCNC: 32.6 G/DL (ref 33.6–35)
MCV RBC AUTO: 90 FL (ref 81.4–97.8)
MICRO URNS: ABNORMAL
MONOCYTES # BLD AUTO: 0.43 K/UL (ref 0–0.85)
MONOCYTES NFR BLD AUTO: 7.7 % (ref 0–13.4)
NEUTROPHILS # BLD AUTO: 3.34 K/UL (ref 2–7.15)
NEUTROPHILS NFR BLD: 59.3 % (ref 44–72)
NITRITE UR QL STRIP.AUTO: NEGATIVE
NRBC # BLD AUTO: 0 K/UL
NRBC BLD-RTO: 0 /100 WBC
PH UR STRIP.AUTO: 7 [PH] (ref 5–8)
PLATELET # BLD AUTO: 274 K/UL (ref 164–446)
PMV BLD AUTO: 9.3 FL (ref 9–12.9)
POTASSIUM SERPL-SCNC: 3.4 MMOL/L (ref 3.6–5.5)
PROT SERPL-MCNC: 7.3 G/DL (ref 6–8.2)
PROT UR QL STRIP: NEGATIVE MG/DL
RBC # BLD AUTO: 4.78 M/UL (ref 4.2–5.4)
RBC UR QL AUTO: NEGATIVE
SODIUM SERPL-SCNC: 137 MMOL/L (ref 135–145)
SP GR UR STRIP.AUTO: 1.02
WBC # BLD AUTO: 5.6 K/UL (ref 4.8–10.8)

## 2022-06-07 PROCEDURE — 36415 COLL VENOUS BLD VENIPUNCTURE: CPT

## 2022-06-07 PROCEDURE — 81003 URINALYSIS AUTO W/O SCOPE: CPT

## 2022-06-07 PROCEDURE — 700111 HCHG RX REV CODE 636 W/ 250 OVERRIDE (IP): Performed by: EMERGENCY MEDICINE

## 2022-06-07 PROCEDURE — 80053 COMPREHEN METABOLIC PANEL: CPT

## 2022-06-07 PROCEDURE — 96375 TX/PRO/DX INJ NEW DRUG ADDON: CPT

## 2022-06-07 PROCEDURE — 83690 ASSAY OF LIPASE: CPT

## 2022-06-07 PROCEDURE — 85025 COMPLETE CBC W/AUTO DIFF WBC: CPT

## 2022-06-07 PROCEDURE — 96374 THER/PROPH/DIAG INJ IV PUSH: CPT

## 2022-06-07 PROCEDURE — 99284 EMERGENCY DEPT VISIT MOD MDM: CPT

## 2022-06-07 RX ORDER — ONDANSETRON 2 MG/ML
4 INJECTION INTRAMUSCULAR; INTRAVENOUS ONCE
Status: COMPLETED | OUTPATIENT
Start: 2022-06-07 | End: 2022-06-07

## 2022-06-07 RX ORDER — HYDROMORPHONE HYDROCHLORIDE 1 MG/ML
0.5 INJECTION, SOLUTION INTRAMUSCULAR; INTRAVENOUS; SUBCUTANEOUS ONCE
Status: COMPLETED | OUTPATIENT
Start: 2022-06-07 | End: 2022-06-07

## 2022-06-07 RX ORDER — KETOROLAC TROMETHAMINE 30 MG/ML
15 INJECTION, SOLUTION INTRAMUSCULAR; INTRAVENOUS ONCE
Status: COMPLETED | OUTPATIENT
Start: 2022-06-07 | End: 2022-06-07

## 2022-06-07 RX ADMIN — HYDROMORPHONE HYDROCHLORIDE 0.5 MG: 1 INJECTION, SOLUTION INTRAMUSCULAR; INTRAVENOUS; SUBCUTANEOUS at 08:13

## 2022-06-07 RX ADMIN — KETOROLAC TROMETHAMINE 15 MG: 30 INJECTION, SOLUTION INTRAMUSCULAR at 08:12

## 2022-06-07 RX ADMIN — ONDANSETRON 4 MG: 2 INJECTION INTRAMUSCULAR; INTRAVENOUS at 08:12

## 2022-06-07 ASSESSMENT — PAIN DESCRIPTION - PAIN TYPE: TYPE: ACUTE PAIN

## 2022-06-07 ASSESSMENT — PAIN DESCRIPTION - DESCRIPTORS: DESCRIPTORS: SHARP

## 2022-06-07 ASSESSMENT — FIBROSIS 4 INDEX: FIB4 SCORE: 0.87

## 2022-06-07 NOTE — ED TRIAGE NOTES
Chief Complaint   Patient presents with   • Flank Pain     L sided flank pain since yesterday. Worse when lying still. Denies urinary symptoms. Patient report similar symptoms approx 5 weeks ago.

## 2022-06-07 NOTE — ED PROVIDER NOTES
ED Provider Note    CHIEF COMPLAINT  Chief Complaint   Patient presents with   • Flank Pain     L sided flank pain since yesterday. Worse when lying still. Denies urinary symptoms. Patient report similar symptoms approx 5 weeks ago.        HPI  Cecilia Taveras is a 56 y.o. female who presents to the emergency department with a chief complaint of left-sided flank pain.  Symptoms started yesterday.  She has been dealing with intermittent flank pains for quite some time now.  She is been seen in the emergency department.  She has had CT scans to evaluate for the etiology of flank pain and has been ruled out for ureterolithiasis.  She was thought to maybe have a GI source.  She was sent to gastroenterology.  She underwent upper endoscopy which demonstrated findings consistent with Wallace's esophagus and a hiatal hernia.  She was started on a proton pump inhibitor which she said was amazing and completely alleviated her pain.  Her epigastric discomfort and left upper quadrant/flank pain completely resolved.  However the flank pain returned yesterday.  She is concerned about the return of this pain.  She rates it as severe.  Is nonradiating.  There is no alleviating or exacerbating factors.  No other associated symptoms.  Because of the return of pain she comes to the emergency department for reevaluation.    REVIEW OF SYSTEMS  See HPI for further details. All other systems are negative.     PAST MEDICAL HISTORY  Past Medical History:   Diagnosis Date   • Thyroid disease        FAMILY HISTORY  Family History   Problem Relation Age of Onset   • Diabetes Mother    • Diabetes Father    • Diabetes Maternal Uncle    • Diabetes Maternal Grandmother    • Diabetes Paternal Grandfather    • Cancer Neg Hx    • Heart Disease Neg Hx    • Hypertension Neg Hx    • Stroke Neg Hx        SOCIAL HISTORY  Social History     Socioeconomic History   • Marital status:    Tobacco Use   • Smoking status: Never Smoker   • Smokeless tobacco:  "Never Used   Vaping Use   • Vaping Use: Never used   Substance and Sexual Activity   • Alcohol use: No   • Drug use: No   • Sexual activity: Yes     Partners: Male     Birth control/protection: Pill       SURGICAL HISTORY  Past Surgical History:   Procedure Laterality Date   • TIBIA NAILING INTRAMEDULLARY Left 7/25/2021    Procedure: INSERTION, INTRAMEDULLARY JONAS, TIBIA;  Surgeon: Charly Ochoa M.D.;  Location: SURGERY [facility];  Service: Orthopedics       CURRENT MEDICATIONS  Home Medications     Reviewed by Emilia Alfred R.N. (Registered Nurse) on 06/07/22 at 0745  Med List Status: Not Addressed   Medication Last Dose Status   Cholecalciferol (D3 PO)  Active   levothyroxine (SYNTHROID) 75 MCG Tab  Active   omeprazole (PRILOSEC) 20 MG delayed-release capsule  Active   ondansetron (ZOFRAN ODT) 4 MG TABLET DISPERSIBLE  Active   triamcinolone acetonide (KENALOG) 0.1 % Cream  Active                ALLERGIES  Allergies   Allergen Reactions   • Pcn [Penicillins] Shortness of Breath and Rash     Rash, SOB, tachycardia       PHYSICAL EXAM  VITAL SIGNS: /47   Pulse 64   Temp 36.7 °C (98.1 °F) (Temporal)   Resp 19   Ht 1.575 m (5' 2\")   Wt 51.3 kg (113 lb 1.5 oz)   LMP  (LMP Unknown)   SpO2 100%   BMI 20.69 kg/m²   Constitutional: Well developed, Well nourished, mild distress, Non-toxic appearance.   HENT: Normocephalic, Atraumatic, Bilateral external ears normal, Oropharynx moist, No oral exudates, Nose normal.   Eyes: PERRL, EOMI, Conjunctiva normal, No discharge.   Neck: Normal range of motion, No tenderness, Supple, No stridor.   Lymphatic: No lymphadenopathy noted.   Cardiovascular: Normal heart rate, Normal rhythm, No murmurs, No rubs, No gallops.   Thorax & Lungs: Normal breath sounds, No respiratory distress, No wheezing, No chest tenderness.   Abdomen: Mild left CVA tenderness to palpation, no rebound tenderness or guarding.  Active bowel sounds.  No palpable masses.  No peritoneal " signs.  Skin: Warm, Dry, No erythema, No rash.   Back: No tenderness, left CVA tenderness.   Extremities: Intact distal pulses, No edema, No tenderness, No cyanosis, No clubbing.   Neurologic: Alert & oriented x 3, Normal motor function, Normal sensory function, No focal deficits noted.       RADIOLOGY/PROCEDURES  Results for orders placed or performed during the hospital encounter of 06/07/22   CBC WITH DIFFERENTIAL   Result Value Ref Range    WBC 5.6 4.8 - 10.8 K/uL    RBC 4.78 4.20 - 5.40 M/uL    Hemoglobin 14.0 12.0 - 16.0 g/dL    Hematocrit 43.0 37.0 - 47.0 %    MCV 90.0 81.4 - 97.8 fL    MCH 29.3 27.0 - 33.0 pg    MCHC 32.6 (L) 33.6 - 35.0 g/dL    RDW 46.4 35.9 - 50.0 fL    Platelet Count 274 164 - 446 K/uL    MPV 9.3 9.0 - 12.9 fL    Neutrophils-Polys 59.30 44.00 - 72.00 %    Lymphocytes 29.40 22.00 - 41.00 %    Monocytes 7.70 0.00 - 13.40 %    Eosinophils 2.70 0.00 - 6.90 %    Basophils 0.50 0.00 - 1.80 %    Immature Granulocytes 0.40 0.00 - 0.90 %    Nucleated RBC 0.00 /100 WBC    Neutrophils (Absolute) 3.34 2.00 - 7.15 K/uL    Lymphs (Absolute) 1.65 1.00 - 4.80 K/uL    Monos (Absolute) 0.43 0.00 - 0.85 K/uL    Eos (Absolute) 0.15 0.00 - 0.51 K/uL    Baso (Absolute) 0.03 0.00 - 0.12 K/uL    Immature Granulocytes (abs) 0.02 0.00 - 0.11 K/uL    NRBC (Absolute) 0.00 K/uL   COMP METABOLIC PANEL   Result Value Ref Range    Sodium 137 135 - 145 mmol/L    Potassium 3.4 (L) 3.6 - 5.5 mmol/L    Chloride 103 96 - 112 mmol/L    Co2 22 20 - 33 mmol/L    Anion Gap 12.0 7.0 - 16.0    Glucose 105 (H) 65 - 99 mg/dL    Bun 22 8 - 22 mg/dL    Creatinine 0.52 0.50 - 1.40 mg/dL    Calcium 9.0 8.4 - 10.2 mg/dL    AST(SGOT) 18 12 - 45 U/L    ALT(SGPT) 17 2 - 50 U/L    Alkaline Phosphatase 89 30 - 99 U/L    Total Bilirubin 0.3 0.1 - 1.5 mg/dL    Albumin 4.3 3.2 - 4.9 g/dL    Total Protein 7.3 6.0 - 8.2 g/dL    Globulin 3.0 1.9 - 3.5 g/dL    A-G Ratio 1.4 g/dL   LIPASE   Result Value Ref Range    Lipase 23 7 - 58 U/L   URINALYSIS  CULTURE, IF INDICATED    Specimen: Urine, Clean Catch   Result Value Ref Range    Color Yellow     Character Clear     Specific Gravity 1.020 <1.035    Ph 7.0 5.0 - 8.0    Glucose Negative Negative mg/dL    Ketones Trace (A) Negative mg/dL    Protein Negative Negative mg/dL    Bilirubin Negative Negative    Nitrite Negative Negative    Leukocyte Esterase Negative Negative    Occult Blood Negative Negative    Micro Urine Req see below    ESTIMATED GFR   Result Value Ref Range    GFR (CKD-EPI) 110 >60 mL/min/1.73 m 2         COURSE & MEDICAL DECISION MAKING  Pertinent Labs & Imaging studies reviewed. (See chart for details)    Patient returns the emergency department with left-sided flank pain.  She had a fairly extensive evaluation for this discomfort.  I have reviewed the electronic medical record.  This included a CT scan which did not demonstrate any evidence of acute abnormality and also showed no evidence of nephrolithiasis.  So far laboratory studies previously have been unrevealing.    Patient demonstrates a significant amount of discomfort in the emergency department today.  She will be treated with IV medications for symptom control.  We will start with laboratory studies including CBC, CMP, lipase, and urinalysis for further evaluation.    Laboratory studies demonstrated normal white blood cell count.  No significant acute abnormality.  No evidence of urinary tract infection.  No hematuria to make me concerned for ureterolithiasis.  Discussed this all with the patient.  She plans to try a bland diet as it seemed to improve her symptoms before when she saw the GI specialist.  She will follow-up with GI as well.  Continue her PPI.    The patient will return for new or worsening symptoms and is stable at the time of discharge.    The patient is referred to a primary physician for blood pressure management, diabetic screening, and for all other preventative health concerns.    DISPOSITION:  Patient will be  discharged home in stable condition.    FOLLOW UP:  Lula Heath M.D.  [address]  [facility] [id number] [phone]    Schedule an appointment as soon as possible for a visit       [facility], Emergency Dept  [address]  [facility] [id number] [phone]    If symptoms worsen      OUTPATIENT MEDICATIONS:  New Prescriptions    No medications on file           FINAL IMPRESSION  1. Left flank pain            Electronically signed by: Saeed Chahal M.D., 6/7/2022 9:48 AM

## 2022-08-27 DIAGNOSIS — R10.13 EPIGASTRIC PAIN: ICD-10-CM

## 2022-08-29 RX ORDER — OMEPRAZOLE 20 MG/1
20 CAPSULE, DELAYED RELEASE ORAL DAILY
Qty: 90 CAPSULE | Refills: 1 | Status: SHIPPED | OUTPATIENT
Start: 2022-08-29 | End: 2023-03-02

## 2022-10-03 DIAGNOSIS — E78.5 DYSLIPIDEMIA: ICD-10-CM

## 2022-10-03 DIAGNOSIS — E55.9 VITAMIN D DEFICIENCY: ICD-10-CM

## 2022-10-03 DIAGNOSIS — E03.9 ACQUIRED HYPOTHYROIDISM: ICD-10-CM

## 2022-10-03 DIAGNOSIS — D70.8 OTHER NEUTROPENIA (HCC): ICD-10-CM

## 2022-10-03 DIAGNOSIS — R73.03 PREDIABETES: ICD-10-CM

## 2022-10-04 ENCOUNTER — OFFICE VISIT (OUTPATIENT)
Dept: MEDICAL GROUP | Facility: LAB | Age: 57
End: 2022-10-04
Payer: COMMERCIAL

## 2022-10-04 VITALS
HEIGHT: 62 IN | TEMPERATURE: 98.5 F | OXYGEN SATURATION: 96 % | HEART RATE: 76 BPM | BODY MASS INDEX: 20.46 KG/M2 | WEIGHT: 111.2 LBS | DIASTOLIC BLOOD PRESSURE: 56 MMHG | RESPIRATION RATE: 14 BRPM | SYSTOLIC BLOOD PRESSURE: 94 MMHG

## 2022-10-04 DIAGNOSIS — R73.03 PREDIABETES: ICD-10-CM

## 2022-10-04 DIAGNOSIS — E03.9 ACQUIRED HYPOTHYROIDISM: ICD-10-CM

## 2022-10-04 PROBLEM — E11.9 TYPE 2 DIABETES MELLITUS WITHOUT COMPLICATION, WITHOUT LONG-TERM CURRENT USE OF INSULIN (HCC): Status: RESOLVED | Noted: 2021-01-06 | Resolved: 2022-10-04

## 2022-10-04 PROCEDURE — 99214 OFFICE O/P EST MOD 30 MIN: CPT | Performed by: NURSE PRACTITIONER

## 2022-10-04 ASSESSMENT — FIBROSIS 4 INDEX: FIB4 SCORE: 0.88

## 2022-10-04 ASSESSMENT — PATIENT HEALTH QUESTIONNAIRE - PHQ9: CLINICAL INTERPRETATION OF PHQ2 SCORE: 0

## 2022-10-04 NOTE — PROGRESS NOTES
"Subjective:     CC:   Chief Complaint   Patient presents with    Requesting Labs     HPI:   Cecilia presents today with the following:    Acquired hypothyroidism  Labwork due. Taking medicine as directed. Denies palpitations, skin changes, temperature intolerance, changes in bowel habits.    Prediabetes  Last A1c was 6.0. Currently working on lifestyle modifications including diet and exercise. Denies any unexplained weight loss, polyuria, polydipsia.     ROS:   Gen: no fevers/chills, no changes in weight  Eyes: no changes in vision  ENT: no sore throat, no hearing loss, no bloody nose  Pulm: no sob, no cough  CV: no chest pain, no palpitations  GI: no nausea/vomiting, no diarrhea  : no dysuria  MSk: no myalgias  Skin: no rash  Neuro: no headaches, no numbness/tingling  Heme/Lymph: no easy bruising        - NOTE: All other systems reviewed and are negative, except as in HPI.    Objective:     Exam: BP (!) 94/56 (BP Location: Right arm, Patient Position: Sitting, BP Cuff Size: Adult)   Pulse 76   Temp 36.9 °C (98.5 °F)   Resp 14   Ht 1.575 m (5' 2\")   Wt 50.4 kg (111 lb 3.2 oz)   SpO2 96%  Body mass index is 20.34 kg/m².    Constitutional: Alert, no distress, well-groomed.  Skin: Warm, dry, good turgor, no rashes in visible areas.  Eye: Equal, round and reactive, conjunctiva clear, lids normal.  ENMT: Lips without lesions, good dentition, moist mucous membranes.  Neck: Trachea midline, no masses, no thyromegaly.  Respiratory: Unlabored respiratory effort, no cough.  MSK: Normal gait, moves all extremities.  Neuro: Grossly non-focal.   Psych: Alert and oriented x3, normal affect and mood.    Assessment & Plan:     57 y.o. female with the following -     1. Acquired hypothyroidism  Continue thyroid medication.  Instructed patient to take on empty stomach with glass of water, 30 minutes prior to food or other medications.  Labs as indicated.  - TSH WITH REFLEX TO FT4; Future    2. Prediabetes  Chronic, stable " condition. Labs as indicated. Advised to reduce sugar/carbohydrate/alcohol, eat more vegetables and lean meats such as fish/chicken/turkey. Recommend 30 minutes of cardiovascular exercise most days of the week. Continue to monitor.  - HEMOGLOBIN A1C; Future  - MICROALBUMIN CREAT RATIO URINE; Future

## 2022-10-04 NOTE — ASSESSMENT & PLAN NOTE
Last A1c was 6.0. Currently working on lifestyle modifications including diet and exercise. Denies any unexplained weight loss, polyuria, polydipsia.

## 2022-10-04 NOTE — ASSESSMENT & PLAN NOTE
Stable. Currently managing with lifestyle modifications.  Denies side effects or hypoglycemic events.  She is not monitoring blood sugar regularly at home.  Reports diet is fair  She is exercising regularly.  Last eye exam: 2022  Denies polyuria, polydipsia, blurred vision, or neuropathies.

## 2022-10-08 ENCOUNTER — HOSPITAL ENCOUNTER (OUTPATIENT)
Dept: LAB | Facility: MEDICAL CENTER | Age: 57
End: 2022-10-08
Attending: NURSE PRACTITIONER
Payer: COMMERCIAL

## 2022-10-08 DIAGNOSIS — E03.9 ACQUIRED HYPOTHYROIDISM: ICD-10-CM

## 2022-10-08 DIAGNOSIS — R73.03 PREDIABETES: ICD-10-CM

## 2022-10-08 LAB
CREAT UR-MCNC: 72.99 MG/DL
EST. AVERAGE GLUCOSE BLD GHB EST-MCNC: 126 MG/DL
HBA1C MFR BLD: 6 % (ref 4–5.6)
MICROALBUMIN UR-MCNC: <1.2 MG/DL
MICROALBUMIN/CREAT UR: NORMAL MG/G (ref 0–30)
TSH SERPL DL<=0.005 MIU/L-ACNC: 1.63 UIU/ML (ref 0.38–5.33)

## 2022-10-08 PROCEDURE — 82043 UR ALBUMIN QUANTITATIVE: CPT

## 2022-10-08 PROCEDURE — 84443 ASSAY THYROID STIM HORMONE: CPT

## 2022-10-08 PROCEDURE — 82570 ASSAY OF URINE CREATININE: CPT

## 2022-10-08 PROCEDURE — 83036 HEMOGLOBIN GLYCOSYLATED A1C: CPT

## 2022-12-29 DIAGNOSIS — E03.9 ACQUIRED HYPOTHYROIDISM: ICD-10-CM

## 2022-12-29 RX ORDER — LEVOTHYROXINE SODIUM 0.07 MG/1
75 TABLET ORAL
Qty: 90 TABLET | Refills: 3 | Status: SHIPPED | OUTPATIENT
Start: 2022-12-29

## 2022-12-29 NOTE — TELEPHONE ENCOUNTER
Received request via: Patient    Was the patient seen in the last year in this department? Yes  10/4/22  Does the patient have an active prescription (recently filled or refills available) for medication(s) requested? No    Does the patient have nursing home Plus and need 100 day supply (blood pressure, diabetes and cholesterol meds only)? Medication is not for cholesterol, blood pressure or diabetes

## 2023-03-02 DIAGNOSIS — R10.13 EPIGASTRIC PAIN: ICD-10-CM

## 2023-03-02 RX ORDER — OMEPRAZOLE 20 MG/1
20 CAPSULE, DELAYED RELEASE ORAL DAILY
Qty: 90 CAPSULE | Refills: 1 | Status: SHIPPED | OUTPATIENT
Start: 2023-03-02

## 2023-03-02 NOTE — TELEPHONE ENCOUNTER
Received request via: Pharmacy    Was the patient seen in the last year in this department? Yes  10/4/22  Does the patient have an active prescription (recently filled or refills available) for medication(s) requested? No    Does the patient have correction Plus and need 100 day supply (blood pressure, diabetes and cholesterol meds only)? Medication is not for cholesterol, blood pressure or diabetes

## 2023-04-17 ENCOUNTER — PATIENT MESSAGE (OUTPATIENT)
Dept: HEALTH INFORMATION MANAGEMENT | Facility: OTHER | Age: 58
End: 2023-04-17

## 2023-07-17 ENCOUNTER — TELEPHONE (OUTPATIENT)
Dept: HEALTH INFORMATION MANAGEMENT | Facility: OTHER | Age: 58
End: 2023-07-17
Payer: COMMERCIAL

## (undated) DEVICE — GLOVE, LITE (PAIR)

## (undated) DEVICE — WIRE GUIDE R-T TRIGEN 3.2MM (1EA)

## (undated) DEVICE — NEPTUNE 4 PORT MANIFOLD - (20/PK)

## (undated) DEVICE — CUFF 30 X 4 TOURNIQUET 2 PORT DISPOSABLE STERILE (10EA/BX)

## (undated) DEVICE — ELECTRODE DUAL RETURN W/ CORD - (50/PK)

## (undated) DEVICE — BIT DRILL INTERTAN 4.0 LONG

## (undated) DEVICE — CANISTER SUCTION RIGID RED 1500CC (40EA/CA)

## (undated) DEVICE — KIT ANESTHESIA W/CIRCUIT & 3/LT BAG W/FILTER (20EA/CA)

## (undated) DEVICE — BLADE SURGICAL #15 - (50/BX 3BX/CA)

## (undated) DEVICE — PROTECTOR ULNA NERVE - (36PR/CA)

## (undated) DEVICE — PAD PREP 24 X 48 CUFFED - (100/CA)

## (undated) DEVICE — HUMID-VENT HEAT AND MOISTURE EXCHANGE- (50/BX)

## (undated) DEVICE — GUIDE TRACHE TUBE INTUBATING STYLET 5.0-10.0MM 14FR (20EA/PK)

## (undated) DEVICE — ROD GUIDE R-T TRIGEN 3 X 1000 (1EA)

## (undated) DEVICE — LACTATED RINGERS INJ 1000 ML - (14EA/CA 60CA/PF)

## (undated) DEVICE — MASK ANESTHESIA ADULT  - (100/CA)

## (undated) DEVICE — SUTURE 0 VICRYL PLUS CT-1 - 36 INCH (36/BX)

## (undated) DEVICE — DRAPE LARGE 3 QUARTER - (20/CA)

## (undated) DEVICE — ELECTRODE 850 FOAM ADHESIVE - HYDROGEL RADIOTRNSPRNT (50/PK)

## (undated) DEVICE — SUTURE GENERAL

## (undated) DEVICE — GLOVE BIOGEL SZ 8 SURGICAL PF LTX - (50PR/BX 4BX/CA)

## (undated) DEVICE — SODIUM CHL IRRIGATION 0.9% 1000ML (12EA/CA)

## (undated) DEVICE — HEAD HOLDER JUNIOR/ADULT

## (undated) DEVICE — TUBING CLEARLINK DUO-VENT - C-FLO (48EA/CA)

## (undated) DEVICE — TOWEL STOP TIMEOUT SAFETY FLAG (40EA/CA)

## (undated) DEVICE — SUCTION INSTRUMENT YANKAUER BULBOUS TIP W/O VENT (50EA/CA)

## (undated) DEVICE — BIT DRILL INTERTAN 4.0 SHORT

## (undated) DEVICE — PACK LOWER EXTREMITY - (2/CA)

## (undated) DEVICE — TUBE E-T HI-LO CUFF 7.0MM (10EA/PK)

## (undated) DEVICE — SUTURE 2-0 VICRYL PLUS CT-1 36 (36PK/BX)"

## (undated) DEVICE — SENSOR SPO2 NEO LNCS ADHESIVE (20/BX) SEE USER NOTES